# Patient Record
Sex: FEMALE | Race: WHITE | Employment: OTHER | URBAN - METROPOLITAN AREA
[De-identification: names, ages, dates, MRNs, and addresses within clinical notes are randomized per-mention and may not be internally consistent; named-entity substitution may affect disease eponyms.]

---

## 2017-02-01 ENCOUNTER — OFFICE VISIT (OUTPATIENT)
Dept: INTERNAL MEDICINE CLINIC | Age: 77
End: 2017-02-01

## 2017-02-01 VITALS
DIASTOLIC BLOOD PRESSURE: 80 MMHG | HEART RATE: 73 BPM | RESPIRATION RATE: 16 BRPM | WEIGHT: 215 LBS | SYSTOLIC BLOOD PRESSURE: 136 MMHG | BODY MASS INDEX: 38.09 KG/M2

## 2017-02-01 DIAGNOSIS — Z12.39 BREAST CANCER SCREENING: ICD-10-CM

## 2017-02-01 DIAGNOSIS — E78.2 MIXED HYPERLIPIDEMIA: ICD-10-CM

## 2017-02-01 DIAGNOSIS — E04.2 MULTINODULAR GOITER: ICD-10-CM

## 2017-02-01 DIAGNOSIS — M81.0 OSTEOPOROSIS: ICD-10-CM

## 2017-02-01 DIAGNOSIS — E11.9 TYPE 2 DIABETES MELLITUS WITHOUT COMPLICATION, WITHOUT LONG-TERM CURRENT USE OF INSULIN (HCC): ICD-10-CM

## 2017-02-01 DIAGNOSIS — E89.0 HYPOTHYROIDISM, POSTSURGICAL: ICD-10-CM

## 2017-02-01 DIAGNOSIS — K75.81 NASH (NONALCOHOLIC STEATOHEPATITIS): ICD-10-CM

## 2017-02-01 DIAGNOSIS — I10 ESSENTIAL HYPERTENSION: Primary | ICD-10-CM

## 2017-02-01 DIAGNOSIS — Z12.11 COLON CANCER SCREENING: ICD-10-CM

## 2017-02-01 PROCEDURE — 99213 OFFICE O/P EST LOW 20 MIN: CPT | Performed by: INTERNAL MEDICINE

## 2017-02-01 PROCEDURE — 93000 ELECTROCARDIOGRAM COMPLETE: CPT | Performed by: INTERNAL MEDICINE

## 2017-02-01 RX ORDER — ALENDRONATE SODIUM 70 MG/1
70 TABLET ORAL
Qty: 12 TABLET | Refills: 1 | Status: SHIPPED | OUTPATIENT
Start: 2017-02-01 | End: 2017-08-04 | Stop reason: SDUPTHER

## 2017-02-01 RX ORDER — LEVOTHYROXINE SODIUM 0.1 MG/1
100 TABLET ORAL DAILY
Qty: 90 TABLET | Refills: 1 | Status: SHIPPED | OUTPATIENT
Start: 2017-02-01 | End: 2017-08-04 | Stop reason: SDUPTHER

## 2017-02-01 RX ORDER — DILTIAZEM HYDROCHLORIDE 240 MG/1
CAPSULE, EXTENDED RELEASE ORAL
Qty: 90 CAPSULE | Refills: 1 | Status: SHIPPED | OUTPATIENT
Start: 2017-02-01 | End: 2017-08-04 | Stop reason: SDUPTHER

## 2017-02-24 ENCOUNTER — OFFICE VISIT (OUTPATIENT)
Dept: INTERNAL MEDICINE CLINIC | Age: 77
End: 2017-02-24

## 2017-02-24 VITALS
DIASTOLIC BLOOD PRESSURE: 80 MMHG | RESPIRATION RATE: 16 BRPM | WEIGHT: 218 LBS | BODY MASS INDEX: 38.62 KG/M2 | SYSTOLIC BLOOD PRESSURE: 153 MMHG | HEART RATE: 74 BPM

## 2017-02-24 DIAGNOSIS — H61.22 IMPACTED CERUMEN OF LEFT EAR: Primary | ICD-10-CM

## 2017-02-24 PROCEDURE — 99213 OFFICE O/P EST LOW 20 MIN: CPT | Performed by: INTERNAL MEDICINE

## 2017-05-15 RX ORDER — AZITHROMYCIN 250 MG/1
TABLET, FILM COATED ORAL
Qty: 1 PACKET | Refills: 0 | Status: SHIPPED | OUTPATIENT
Start: 2017-05-15 | End: 2017-05-25

## 2017-06-19 ENCOUNTER — HOSPITAL ENCOUNTER (OUTPATIENT)
Dept: WOMENS IMAGING | Age: 77
Discharge: HOME | End: 2017-06-19
Attending: INTERNAL MEDICINE | Admitting: INTERNAL MEDICINE

## 2017-06-19 DIAGNOSIS — Z12.39 BREAST CANCER SCREENING: ICD-10-CM

## 2017-06-22 ENCOUNTER — OFFICE VISIT (OUTPATIENT)
Dept: INTERNAL MEDICINE CLINIC | Age: 77
End: 2017-06-22

## 2017-06-22 VITALS
HEART RATE: 68 BPM | WEIGHT: 216.6 LBS | BODY MASS INDEX: 38.37 KG/M2 | RESPIRATION RATE: 16 BRPM | SYSTOLIC BLOOD PRESSURE: 138 MMHG | DIASTOLIC BLOOD PRESSURE: 70 MMHG

## 2017-06-22 DIAGNOSIS — Z12.39 BREAST CANCER SCREENING: ICD-10-CM

## 2017-06-22 DIAGNOSIS — M81.0 OSTEOPOROSIS: ICD-10-CM

## 2017-06-22 DIAGNOSIS — E11.9 TYPE 2 DIABETES MELLITUS WITHOUT COMPLICATION, WITHOUT LONG-TERM CURRENT USE OF INSULIN (HCC): ICD-10-CM

## 2017-06-22 DIAGNOSIS — I73.9 CLAUDICATION (HCC): Primary | ICD-10-CM

## 2017-06-22 DIAGNOSIS — E89.0 HYPOTHYROIDISM, POSTSURGICAL: ICD-10-CM

## 2017-06-22 DIAGNOSIS — I10 ESSENTIAL HYPERTENSION: ICD-10-CM

## 2017-06-22 PROCEDURE — 99214 OFFICE O/P EST MOD 30 MIN: CPT | Performed by: INTERNAL MEDICINE

## 2017-06-27 ENCOUNTER — HOSPITAL ENCOUNTER (OUTPATIENT)
Dept: ULTRASOUND IMAGING | Age: 77
Discharge: HOME | End: 2017-06-27
Attending: INTERNAL MEDICINE | Admitting: INTERNAL MEDICINE

## 2017-06-27 DIAGNOSIS — N64.89 BREAST ASYMMETRY: ICD-10-CM

## 2017-06-27 DIAGNOSIS — R92.8 ABNORMAL MAMMOGRAM: ICD-10-CM

## 2017-08-04 RX ORDER — LEVOTHYROXINE SODIUM 0.1 MG/1
100 TABLET ORAL DAILY
Qty: 90 TABLET | Refills: 1 | Status: SHIPPED | OUTPATIENT
Start: 2017-08-04 | End: 2018-02-06 | Stop reason: SDUPTHER

## 2017-08-04 RX ORDER — ALENDRONATE SODIUM 70 MG/1
70 TABLET ORAL
Qty: 12 TABLET | Refills: 1 | Status: SHIPPED | OUTPATIENT
Start: 2017-08-04 | End: 2018-02-06 | Stop reason: SDUPTHER

## 2017-08-04 RX ORDER — DILTIAZEM HYDROCHLORIDE 240 MG/1
CAPSULE, EXTENDED RELEASE ORAL
Qty: 90 CAPSULE | Refills: 1 | Status: SHIPPED | OUTPATIENT
Start: 2017-08-04 | End: 2018-02-06 | Stop reason: SDUPTHER

## 2017-09-12 ENCOUNTER — TELEPHONE (OUTPATIENT)
Dept: INTERNAL MEDICINE CLINIC | Age: 77
End: 2017-09-12

## 2017-09-12 RX ORDER — AZITHROMYCIN 250 MG/1
TABLET, FILM COATED ORAL
Qty: 1 PACKET | Refills: 0 | Status: SHIPPED | OUTPATIENT
Start: 2017-09-12 | End: 2017-09-22

## 2017-10-06 ENCOUNTER — OFFICE VISIT (OUTPATIENT)
Dept: INTERNAL MEDICINE CLINIC | Age: 77
End: 2017-10-06

## 2017-10-06 VITALS
WEIGHT: 217 LBS | RESPIRATION RATE: 16 BRPM | SYSTOLIC BLOOD PRESSURE: 108 MMHG | HEART RATE: 60 BPM | BODY MASS INDEX: 38.44 KG/M2 | DIASTOLIC BLOOD PRESSURE: 70 MMHG

## 2017-10-06 DIAGNOSIS — E78.2 MIXED HYPERLIPIDEMIA: ICD-10-CM

## 2017-10-06 DIAGNOSIS — E11.620 NECROBIOSIS LIPOIDICA DIABETICORUM (HCC): ICD-10-CM

## 2017-10-06 DIAGNOSIS — I10 ESSENTIAL HYPERTENSION: Primary | ICD-10-CM

## 2017-10-06 DIAGNOSIS — E03.9 HYPOTHYROIDISM, UNSPECIFIED TYPE: ICD-10-CM

## 2017-10-06 DIAGNOSIS — Z23 NEED FOR IMMUNIZATION AGAINST INFLUENZA: ICD-10-CM

## 2017-10-06 PROCEDURE — G0008 ADMIN INFLUENZA VIRUS VAC: HCPCS | Performed by: INTERNAL MEDICINE

## 2017-10-06 PROCEDURE — 90662 IIV NO PRSV INCREASED AG IM: CPT | Performed by: INTERNAL MEDICINE

## 2017-10-06 PROCEDURE — 99214 OFFICE O/P EST MOD 30 MIN: CPT | Performed by: INTERNAL MEDICINE

## 2017-10-06 NOTE — MR AVS SNAPSHOT
After Visit Summary             Tere Ellington   10/6/2017 11:00 AM   Office Visit    Description:  Female : 1940   Provider:  Kylie Nunn MD   Department:  [facility] Internal Medicine              Your Follow-Up and Future Appointments         Below is a list of your follow-up and future appointments. This may not be a complete list as you may have made appointments directly with providers that we are not aware of or your providers may have made some for you. Please call your providers to confirm appointments. It is important to keep your appointments. Please bring your current insurance card, photo ID, co-pay, and all medication bottles to your appointment. If self-pay, payment is expected at the time of service. Your To-Do List     Future Orders Complete By Expires    CBC Auto Differential [IJM2631 Custom]  10/6/2017 (Approximate) 10/6/2018    Comprehensive Metabolic Panel [LFC19 Custom]  10/6/2017 (Approximate) 10/6/2018    Lipid Panel [LAB18 Custom]  10/6/2017 (Approximate) 10/6/2018    Follow-Up    Return in about 4 months (around 2018). Information from Your Visit        Department     Name Address Phone Fax    [facility] Internal Medicine [address] [place], [place] [address] [phone]      You Were Seen for:         Comments    Essential hypertension   [[id number]]         Vital Signs     Blood Pressure Pulse Respirations Weight Body Mass Index Smoking Status    108/70 60 16 217 lb (98.4 kg) 38.44 kg/m2 Never Smoker         Today's Medication Changes          These changes are accurate as of: 10/6/17 11:15 AM.  If you have any questions, ask your nurse or doctor.                STOP taking these medications           DEMADEX 20 MG tablet   Generic drug:  torsemide   Stopped by:  Kylie Nunn MD       lisinopril 5 MG tablet   Commonly known as:  PRINIVIL;ZESTRIL   Stopped by:  Kylie Nunn MD               Your Current Medications Are results, renew your prescriptions, schedule appointments, view visit notes, and more. How Do I Sign Up? 1. In your Internet browser, go to [email or web address]  2. Click on the Sign Up Now link in the Sign In box. You will see the New Member Sign Up page. 3. Enter your MEDSEEKt Access Code exactly as it appears below. You will not need to use this code after youve completed the sign-up process. If you do not sign up before the expiration date, you must request a new code. [employer] Access Code: [email or web address]  Expires: 12/5/2017 11:15 AM    4. Enter your Social Security Number ([id number]) and Date of Birth (mm/dd/yyyy) as indicated and click Submit. You will be taken to the next sign-up page. 5. Create a [employer] ID. This will be your [employer] login ID and cannot be changed, so think of one that is secure and easy to remember. 6. Create a [employer] password. You can change your password at any time. 7. Enter your Password Reset Question and Answer. This can be used at a later time if you forget your password. 8. Enter your e-mail address. You will receive e-mail notification when new information is available in [address] 9. Click Sign Up. You can now view your medical record. Additional Information  If you have questions, please contact the physician practice where you receive care. Remember, [employer] is NOT to be used for urgent needs. For medical emergencies, dial 911. For questions regarding your [employer] account call [phone]. If you have a clinical question, please call your doctor's office.

## 2017-10-06 NOTE — PROGRESS NOTES
Subjective: Bernadette Coleman is a 76 y.o. female who presents today for follow up on her chronic medical conditions as noted below. Patient Active Problem List:     Type II Diabetes Mellitus     VILLANUEVA (nonalcoholic steatohepatitis)     Hypertension     Colon cancer screening     Breast cancer screening     Multinodular goiter     Necrobiosis lipoidica diabeticorum ([facility] [address]     Family history of abdominal aortic aneurysm     Lumbar Spinal Stenosis     Proximal humerus fracture     Osteoporosis     Obesity, Class II, BMI 35-39.9, with comorbidity ([facility] [address]     Hypothyroidism, postsurgical     Urolithiasis     She was last seen in June    She has stopped lisinopril and demadex; doesn't want to take them    Got mammogram and US breast and ultimately turned out normal    Last visit in June she complained of feeling in legs that could be claudication; didn't want to get the vasc study I caroldered; complained of inadequate  assistnace to get there    Have also referred for colonosocopy and she pleads inadequate transport for not getting that, but has order and was urged to get it schuedlued    Patient denies any exertional chest pain, dyspnea, palpitations, syncope, orthopnea, edema or paroxysmal nocturnal dyspnea. bp is fine    Is no longer clinicaly diabetic  Last FBS in June 99 with hgbA1c 5.1    Her necrobiosis lipoidica diabeticorum is much improved from past ; mild at this point  Current Outpatient Prescriptions   Medication Sig Dispense Refill    levothyroxine (SYNTHROID) 100 MCG tablet Take 1 tablet by mouth daily 90 tablet 1    alendronate (FOSAMAX) 70 MG tablet Take 1 tablet by mouth every 7 days 12 tablet 1    diltiazem (DILACOR XR) 240 MG extended release capsule TAKE ONE CAPSULE BY MOUTH DAILY 90 capsule 1    ammonium lactate (LAC-HYDRIN) 12 % cream Apply as directed 1 Tube 3    mometasone (ELOCON) 0.1 % cream Apply topically daily.  1 Tube 1    Calcium Citrate-Vitamin D (CITRACAL + D PO) Take 500  INFLUENZA, HIGH DOSE, 65 YRS +, IM, PF, PREFILL SYR, 0.5ML (FLUZONE HD)    CBC Auto Differential    Comprehensive Metabolic Panel    Lipid Panel    TSH without Reflex     Get colonoscoy   Monitor bp

## 2018-02-06 ENCOUNTER — OFFICE VISIT (OUTPATIENT)
Dept: INTERNAL MEDICINE CLINIC | Age: 78
End: 2018-02-06

## 2018-02-06 VITALS
BODY MASS INDEX: 38.58 KG/M2 | RESPIRATION RATE: 16 BRPM | HEART RATE: 64 BPM | WEIGHT: 217.8 LBS | SYSTOLIC BLOOD PRESSURE: 120 MMHG | DIASTOLIC BLOOD PRESSURE: 60 MMHG

## 2018-02-06 DIAGNOSIS — E11.620 NECROBIOSIS LIPOIDICA DIABETICORUM (HCC): ICD-10-CM

## 2018-02-06 DIAGNOSIS — G56.03 CARPAL TUNNEL SYNDROME ON BOTH SIDES: Primary | ICD-10-CM

## 2018-02-06 DIAGNOSIS — E11.628 TYPE 2 DIABETES MELLITUS WITH OTHER SKIN COMPLICATION, WITHOUT LONG-TERM CURRENT USE OF INSULIN (HCC): ICD-10-CM

## 2018-02-06 PROCEDURE — 99213 OFFICE O/P EST LOW 20 MIN: CPT | Performed by: INTERNAL MEDICINE

## 2018-02-06 RX ORDER — ALENDRONATE SODIUM 70 MG/1
70 TABLET ORAL
Qty: 12 TABLET | Refills: 1 | Status: SHIPPED | OUTPATIENT
Start: 2018-02-06 | End: 2018-09-06 | Stop reason: SDUPTHER

## 2018-02-06 RX ORDER — LEVOTHYROXINE SODIUM 0.1 MG/1
100 TABLET ORAL DAILY
Qty: 90 TABLET | Refills: 1 | Status: SHIPPED | OUTPATIENT
Start: 2018-02-06 | End: 2018-09-06 | Stop reason: SDUPTHER

## 2018-02-06 RX ORDER — DILTIAZEM HYDROCHLORIDE 240 MG/1
CAPSULE, EXTENDED RELEASE ORAL
Qty: 90 CAPSULE | Refills: 1 | Status: SHIPPED | OUTPATIENT
Start: 2018-02-06 | End: 2018-09-06 | Stop reason: SDUPTHER

## 2018-02-06 NOTE — PATIENT INSTRUCTIONS
Patient Education        Carpal Tunnel Syndrome: Care Instructions  Your Care Instructions    Carpal tunnel syndrome is a nerve problem. It can cause tingling, numbness, weakness, or pain in the fingers, thumb, and hand. The median nerve and several tough tissues called tendons run through a space in the wrist called the carpal tunnel. The repeated hand motions used in work and some hobbies and sports can put pressure on the nerve. Pregnancy and several conditions, including diabetes, arthritis, and an underactive thyroid, also can cause carpal tunnel syndrome. You may be able to limit an activity or do it differently to reduce your symptoms. You also can take other steps to feel better. If your symptoms are mild, 1 to 2 weeks of home treatment are likely to ease your pain. Surgery is needed only if other treatments do not work. Follow-up care is a key part of your treatment and safety. Be sure to make and go to all appointments, and call your doctor if you are having problems. It's also a good idea to know your test results and keep a list of the medicines you take. How can you care for yourself at home? · If possible, stop or reduce the activity that causes your symptoms. If you cannot stop the activity, take frequent breaks to rest and stretch or change hand positions to do a task. Try switching hands, such as when using a computer mouse. · Try to avoid bending or twisting your wrists. · Ask your doctor if you can take an over-the-counter pain medicine, such as acetaminophen (Tylenol), ibuprofen (Advil, Motrin), or naproxen (Aleve). Be safe with medicines. Read and follow all instructions on the label. · If your doctor prescribes corticosteroid medicine to help reduce pain and swelling, take it exactly as prescribed. Call your doctor if you think you are having a problem with your medicine. · Put ice or a cold pack on your wrist for 10 to 20 minutes at a time to ease pain.  Put a thin cloth between the

## 2018-02-06 NOTE — PROGRESS NOTES
 Hypothyroidism, postsurgical 1997    Left Thyroid Lobectomy    Lumbar Spinal Stenosis 2006    severe    Multinodular goiter 1996    suppresion and bx negative; s/p thyroid lobectomy    VILLANUEVA (nonalcoholic steatohepatitis) 2005    villanueva vs cirrhosis;  Dr. Jeronimo Marsk Necrobiosis lipoidica diabeticorum ([facility] [address]     Obesity, Class II, BMI 35-39.9, with comorbidity     Osteoporosis 1/2015    FRAX 5.5 & 17%    Polymyalgia rheumatica ([facility] [address] 1/2012    Dr Sayda Dubon R; released her 3/2014    RBBB 02/2017    new finding    Type II Diabetes Mellitus 1994    Urolithiasis     once        Social History   Substance Use Topics    Smoking status: Never Smoker    Smokeless tobacco: Never Used    Alcohol use No        ROS: The patient has had no headache, sore throat, fever or chills, cough, dyspnea, chest pain, nausea, vomiting or diarrhea, or edema. Objective:      /60   Pulse 64   Resp 16   Wt 217 lb 12.8 oz (98.8 kg)   BMI 38.58 kg/m²    General: in no apparent distress   The patient's neck is free of nodes. Lungs are clear. Heart is normal in rate and regular in rhythm. Legs are free of edema. No rash or erythema. Feb lab rev'd     Assessment / Plan:      1. Carpal tunnel syndrome on both sides    2. Type 2 diabetes mellitus with other skin complication, without long-term current use of insulin (HCC)    3.  Necrobiosis lipoidica diabeticorum ([facility] [address]            Plan   Carpal tunnel splints prescribed  Exercises prescribed and printed  Same meds  RTC 4 mo    Referred her in Oct for colonoscopy and she hasn't done it yet ; encouraged to do so

## 2018-03-05 ENCOUNTER — TELEPHONE (OUTPATIENT)
Dept: INTERNAL MEDICINE CLINIC | Age: 78
End: 2018-03-05

## 2018-03-05 RX ORDER — AZITHROMYCIN 250 MG/1
TABLET, FILM COATED ORAL
Qty: 1 PACKET | Refills: 0 | Status: SHIPPED | OUTPATIENT
Start: 2018-03-05 | End: 2018-03-15

## 2018-03-15 ENCOUNTER — TELEPHONE (OUTPATIENT)
Dept: INTERNAL MEDICINE CLINIC | Age: 78
End: 2018-03-15

## 2018-03-16 ENCOUNTER — OFFICE VISIT (OUTPATIENT)
Dept: INTERNAL MEDICINE CLINIC | Age: 78
End: 2018-03-16

## 2018-03-16 VITALS
BODY MASS INDEX: 38.09 KG/M2 | WEIGHT: 215 LBS | RESPIRATION RATE: 16 BRPM | SYSTOLIC BLOOD PRESSURE: 124 MMHG | HEART RATE: 80 BPM | DIASTOLIC BLOOD PRESSURE: 62 MMHG | TEMPERATURE: 97.8 F

## 2018-03-16 DIAGNOSIS — J40 BRONCHITIS: Primary | ICD-10-CM

## 2018-03-16 PROCEDURE — 99213 OFFICE O/P EST LOW 20 MIN: CPT | Performed by: INTERNAL MEDICINE

## 2018-03-16 RX ORDER — LEVOFLOXACIN 500 MG/1
500 TABLET, FILM COATED ORAL DAILY
Qty: 7 TABLET | Refills: 0 | Status: SHIPPED | OUTPATIENT
Start: 2018-03-16 | End: 2018-03-23

## 2018-03-16 RX ORDER — METHYLPREDNISOLONE 4 MG/1
TABLET ORAL
Qty: 1 KIT | Refills: 0 | Status: SHIPPED | OUTPATIENT
Start: 2018-03-16 | End: 2018-06-06 | Stop reason: ALTCHOICE

## 2018-03-16 RX ORDER — IPRATROPIUM BROMIDE 42 UG/1
2 SPRAY, METERED NASAL 3 TIMES DAILY
Qty: 1 BOTTLE | Refills: 3 | Status: SHIPPED | OUTPATIENT
Start: 2018-03-16 | End: 2020-02-27 | Stop reason: ALTCHOICE

## 2018-03-16 NOTE — PROGRESS NOTES
Subjective: Lucina Montez is a 76 y.o. female who presents today for follow up on her chronic medical conditions as noted below. Patient Active Problem List:     Type II Diabetes Mellitus     VILLANUEVA (nonalcoholic steatohepatitis)     Hypertension     Colon cancer screening     Breast cancer screening     Multinodular goiter     Necrobiosis lipoidica diabeticorum ([address]     Family history of abdominal aortic aneurysm     Lumbar Spinal Stenosis     Proximal humerus fracture     Osteoporosis     Obesity, Class II, BMI 35-39.9, with comorbidity     Hypothyroidism, postsurgical     Urolithiasis     She was last seen in jan for CTS    Complains of cough today    I called in zpak on March 5 for resp infection  She completed w/o resolution    She is currently complaining of rhinorrhea, clogged ears and lots of coughing  Has paroxysms of coughing  Very productive  Some wheezing    No otalgia   No fever or tavel    Current Outpatient Prescriptions   Medication Sig Dispense Refill    methylPREDNISolone (MEDROL, TIFFANIE,) 4 MG tablet Take as instructed on the package 1 kit 0    ipratropium (ATROVENT) 0.06 % nasal spray 2 sprays by Nasal route 3 times daily 1 Bottle 3    levofloxacin (LEVAQUIN) 500 MG tablet Take 1 tablet by mouth daily for 7 days 7 tablet 0    diltiazem (DILACOR XR) 240 MG extended release capsule TAKE ONE CAPSULE BY MOUTH DAILY 90 capsule 1    levothyroxine (SYNTHROID) 100 MCG tablet Take 1 tablet by mouth daily 90 tablet 1    alendronate (FOSAMAX) 70 MG tablet Take 1 tablet by mouth every 7 days 12 tablet 1    ammonium lactate (LAC-HYDRIN) 12 % cream Apply as directed 1 Tube 3    mometasone (ELOCON) 0.1 % cream Apply topically daily. 1 Tube 1    Calcium Citrate-Vitamin D (CITRACAL + D PO) Take 500 mg by mouth daily.  Cholecalciferol (VITAMIN D) 2000 UNITS CAPS capsule Take 1 capsule by mouth daily. No current facility-administered medications for this visit.         Past Medical History:

## 2018-03-17 ENCOUNTER — HOSPITAL ENCOUNTER (OUTPATIENT)
Dept: GENERAL RADIOLOGY | Age: 78
Discharge: HOME | End: 2018-03-17
Attending: INTERNAL MEDICINE | Admitting: INTERNAL MEDICINE

## 2018-03-17 DIAGNOSIS — J40 BRONCHITIS: ICD-10-CM

## 2018-06-06 ENCOUNTER — OFFICE VISIT (OUTPATIENT)
Dept: INTERNAL MEDICINE CLINIC | Age: 78
End: 2018-06-06

## 2018-06-06 VITALS
RESPIRATION RATE: 16 BRPM | HEART RATE: 68 BPM | BODY MASS INDEX: 38.09 KG/M2 | SYSTOLIC BLOOD PRESSURE: 134 MMHG | HEIGHT: 63 IN | WEIGHT: 215 LBS | DIASTOLIC BLOOD PRESSURE: 76 MMHG

## 2018-06-06 DIAGNOSIS — E78.2 MIXED HYPERLIPIDEMIA: ICD-10-CM

## 2018-06-06 DIAGNOSIS — I10 ESSENTIAL HYPERTENSION: Primary | ICD-10-CM

## 2018-06-06 DIAGNOSIS — E11.9 CONTROLLED TYPE 2 DIABETES MELLITUS WITHOUT COMPLICATION, WITHOUT LONG-TERM CURRENT USE OF INSULIN (HCC): ICD-10-CM

## 2018-06-06 PROCEDURE — 3288F FALL RISK ASSESSMENT DOCD: CPT | Performed by: INTERNAL MEDICINE

## 2018-06-06 PROCEDURE — G8510 SCR DEP NEG, NO PLAN REQD: HCPCS | Performed by: INTERNAL MEDICINE

## 2018-06-06 PROCEDURE — 99213 OFFICE O/P EST LOW 20 MIN: CPT | Performed by: INTERNAL MEDICINE

## 2018-06-06 ASSESSMENT — PATIENT HEALTH QUESTIONNAIRE - PHQ9
2. FEELING DOWN, DEPRESSED OR HOPELESS: 0
SUM OF ALL RESPONSES TO PHQ QUESTIONS 1-9: 0
1. LITTLE INTEREST OR PLEASURE IN DOING THINGS: 0
SUM OF ALL RESPONSES TO PHQ9 QUESTIONS 1 & 2: 0

## 2018-09-06 ENCOUNTER — OFFICE VISIT (OUTPATIENT)
Dept: INTERNAL MEDICINE CLINIC | Age: 78
End: 2018-09-06

## 2018-09-06 VITALS
BODY MASS INDEX: 37.38 KG/M2 | DIASTOLIC BLOOD PRESSURE: 80 MMHG | RESPIRATION RATE: 16 BRPM | OXYGEN SATURATION: 98 % | WEIGHT: 211 LBS | HEART RATE: 65 BPM | SYSTOLIC BLOOD PRESSURE: 138 MMHG

## 2018-09-06 DIAGNOSIS — E03.9 HYPOTHYROIDISM, UNSPECIFIED TYPE: ICD-10-CM

## 2018-09-06 DIAGNOSIS — I10 ESSENTIAL HYPERTENSION: Primary | ICD-10-CM

## 2018-09-06 DIAGNOSIS — E11.628 TYPE 2 DIABETES MELLITUS WITH OTHER SKIN COMPLICATION, WITHOUT LONG-TERM CURRENT USE OF INSULIN (HCC): ICD-10-CM

## 2018-09-06 PROCEDURE — 99213 OFFICE O/P EST LOW 20 MIN: CPT | Performed by: INTERNAL MEDICINE

## 2018-09-06 RX ORDER — ALENDRONATE SODIUM 70 MG/1
70 TABLET ORAL
Qty: 12 TABLET | Refills: 1 | Status: SHIPPED | OUTPATIENT
Start: 2018-09-06 | End: 2019-03-28 | Stop reason: SDUPTHER

## 2018-09-06 RX ORDER — LEVOTHYROXINE SODIUM 0.1 MG/1
100 TABLET ORAL DAILY
Qty: 90 TABLET | Refills: 1 | Status: SHIPPED | OUTPATIENT
Start: 2018-09-06 | End: 2019-03-28 | Stop reason: SDUPTHER

## 2018-09-06 RX ORDER — DILTIAZEM HYDROCHLORIDE 240 MG/1
CAPSULE, EXTENDED RELEASE ORAL
Qty: 90 CAPSULE | Refills: 1 | Status: SHIPPED | OUTPATIENT
Start: 2018-09-06 | End: 2019-03-28 | Stop reason: SDUPTHER

## 2018-10-25 ENCOUNTER — TELEPHONE (OUTPATIENT)
Dept: INTERNAL MEDICINE CLINIC | Age: 78
End: 2018-10-25

## 2018-10-25 RX ORDER — AZITHROMYCIN 250 MG/1
TABLET, FILM COATED ORAL
Qty: 1 PACKET | Refills: 0 | Status: SHIPPED | OUTPATIENT
Start: 2018-10-25 | End: 2018-10-29

## 2019-02-06 ENCOUNTER — OFFICE VISIT (OUTPATIENT)
Dept: INTERNAL MEDICINE CLINIC | Age: 79
End: 2019-02-06
Payer: COMMERCIAL

## 2019-02-06 VITALS
BODY MASS INDEX: 37.02 KG/M2 | WEIGHT: 209 LBS | OXYGEN SATURATION: 97 % | HEART RATE: 89 BPM | DIASTOLIC BLOOD PRESSURE: 70 MMHG | SYSTOLIC BLOOD PRESSURE: 138 MMHG

## 2019-02-06 DIAGNOSIS — I10 ESSENTIAL HYPERTENSION: ICD-10-CM

## 2019-02-06 DIAGNOSIS — Z23 NEED FOR IMMUNIZATION AGAINST INFLUENZA: ICD-10-CM

## 2019-02-06 DIAGNOSIS — E11.628 TYPE 2 DIABETES MELLITUS WITH OTHER SKIN COMPLICATION, WITHOUT LONG-TERM CURRENT USE OF INSULIN (HCC): ICD-10-CM

## 2019-02-06 DIAGNOSIS — N18.30 CHRONIC KIDNEY DISEASE, STAGE III (MODERATE) (HCC): ICD-10-CM

## 2019-02-06 DIAGNOSIS — E03.9 HYPOTHYROIDISM, UNSPECIFIED TYPE: ICD-10-CM

## 2019-02-06 DIAGNOSIS — G56.03 BILATERAL CARPAL TUNNEL SYNDROME: Primary | ICD-10-CM

## 2019-02-06 DIAGNOSIS — E78.2 MIXED HYPERLIPIDEMIA: ICD-10-CM

## 2019-02-06 DIAGNOSIS — E11.620 NECROBIOSIS LIPOIDICA DIABETICORUM (HCC): ICD-10-CM

## 2019-02-06 PROCEDURE — 99214 OFFICE O/P EST MOD 30 MIN: CPT | Performed by: INTERNAL MEDICINE

## 2019-02-06 PROCEDURE — G0008 ADMIN INFLUENZA VIRUS VAC: HCPCS | Performed by: INTERNAL MEDICINE

## 2019-02-06 PROCEDURE — 90662 IIV NO PRSV INCREASED AG IM: CPT | Performed by: INTERNAL MEDICINE

## 2019-02-26 ENCOUNTER — OFFICE VISIT (OUTPATIENT)
Dept: PHYSICAL MEDICINE AND REHAB | Age: 79
End: 2019-02-26
Payer: COMMERCIAL

## 2019-02-26 DIAGNOSIS — M79.601 PARESTHESIA AND PAIN OF BOTH UPPER EXTREMITIES: Primary | ICD-10-CM

## 2019-02-26 DIAGNOSIS — M79.602 PARESTHESIA AND PAIN OF BOTH UPPER EXTREMITIES: Primary | ICD-10-CM

## 2019-02-26 DIAGNOSIS — R29.898 HAND WEAKNESS: ICD-10-CM

## 2019-02-26 DIAGNOSIS — G56.03 BILATERAL CARPAL TUNNEL SYNDROME: ICD-10-CM

## 2019-02-26 DIAGNOSIS — R20.2 PARESTHESIA AND PAIN OF BOTH UPPER EXTREMITIES: Primary | ICD-10-CM

## 2019-02-26 PROCEDURE — 95911 NRV CNDJ TEST 9-10 STUDIES: CPT | Performed by: PHYSICAL MEDICINE & REHABILITATION

## 2019-02-26 PROCEDURE — 95886 MUSC TEST DONE W/N TEST COMP: CPT | Performed by: PHYSICAL MEDICINE & REHABILITATION

## 2019-03-12 ENCOUNTER — OFFICE VISIT (OUTPATIENT)
Dept: SURGERY | Age: 79
End: 2019-03-12
Payer: COMMERCIAL

## 2019-03-12 VITALS
RESPIRATION RATE: 14 BRPM | WEIGHT: 210.8 LBS | SYSTOLIC BLOOD PRESSURE: 143 MMHG | BODY MASS INDEX: 37.35 KG/M2 | DIASTOLIC BLOOD PRESSURE: 59 MMHG | HEART RATE: 66 BPM | HEIGHT: 63 IN

## 2019-03-12 DIAGNOSIS — G56.03 BILATERAL CARPAL TUNNEL SYNDROME: Primary | ICD-10-CM

## 2019-03-12 PROCEDURE — 99202 OFFICE O/P NEW SF 15 MIN: CPT | Performed by: SURGERY

## 2019-03-12 ASSESSMENT — ENCOUNTER SYMPTOMS
APNEA: 0
ABDOMINAL DISTENTION: 0
CHEST TIGHTNESS: 0
CONSTIPATION: 0
NAUSEA: 0

## 2019-03-13 ENCOUNTER — ANESTHESIA EVENT (OUTPATIENT)
Dept: OPERATING ROOM | Age: 79
End: 2019-03-13
Payer: COMMERCIAL

## 2019-03-18 ENCOUNTER — HOSPITAL ENCOUNTER (OUTPATIENT)
Age: 79
Setting detail: OUTPATIENT SURGERY
Discharge: HOME | End: 2019-03-18
Attending: SURGERY | Admitting: SURGERY
Payer: COMMERCIAL

## 2019-03-18 ENCOUNTER — ANESTHESIA (OUTPATIENT)
Dept: OPERATING ROOM | Age: 79
End: 2019-03-18
Payer: COMMERCIAL

## 2019-03-18 VITALS
RESPIRATION RATE: 14 BRPM | DIASTOLIC BLOOD PRESSURE: 63 MMHG | SYSTOLIC BLOOD PRESSURE: 125 MMHG | OXYGEN SATURATION: 98 % | TEMPERATURE: 97.7 F

## 2019-03-18 VITALS
OXYGEN SATURATION: 96 % | RESPIRATION RATE: 16 BRPM | SYSTOLIC BLOOD PRESSURE: 152 MMHG | HEART RATE: 61 BPM | TEMPERATURE: 97 F | DIASTOLIC BLOOD PRESSURE: 67 MMHG | BODY MASS INDEX: 37.21 KG/M2 | WEIGHT: 210 LBS | HEIGHT: 63 IN

## 2019-03-18 DIAGNOSIS — G56.03 BILATERAL CARPAL TUNNEL SYNDROME: Primary | ICD-10-CM

## 2019-03-18 LAB — GLUCOSE BLD-MCNC: 96 MG/DL (ref 70–99)

## 2019-03-18 PROCEDURE — 2580000003 HC RX 258

## 2019-03-18 PROCEDURE — 64721 CARPAL TUNNEL SURGERY: CPT | Performed by: SURGERY

## 2019-03-18 PROCEDURE — 3600000012 HC SURGERY LEVEL 2 ADDTL 15MIN: Performed by: SURGERY

## 2019-03-18 PROCEDURE — 6360000002 HC RX W HCPCS: Performed by: NURSE ANESTHETIST, CERTIFIED REGISTERED

## 2019-03-18 PROCEDURE — 7100000011 HC PHASE II RECOVERY - ADDTL 15 MIN: Performed by: SURGERY

## 2019-03-18 PROCEDURE — 3700000000 HC ANESTHESIA ATTENDED CARE: Performed by: SURGERY

## 2019-03-18 PROCEDURE — 7100000010 HC PHASE II RECOVERY - FIRST 15 MIN: Performed by: SURGERY

## 2019-03-18 PROCEDURE — 3700000001 HC ADD 15 MINUTES (ANESTHESIA): Performed by: SURGERY

## 2019-03-18 PROCEDURE — 82962 GLUCOSE BLOOD TEST: CPT

## 2019-03-18 PROCEDURE — 3600000002 HC SURGERY LEVEL 2 BASE: Performed by: SURGERY

## 2019-03-18 PROCEDURE — 2709999900 HC NON-CHARGEABLE SUPPLY: Performed by: SURGERY

## 2019-03-18 RX ORDER — SODIUM CHLORIDE, SODIUM LACTATE, POTASSIUM CHLORIDE, CALCIUM CHLORIDE 600; 310; 30; 20 MG/100ML; MG/100ML; MG/100ML; MG/100ML
INJECTION, SOLUTION INTRAVENOUS
Status: COMPLETED
Start: 2019-03-18 | End: 2019-03-18

## 2019-03-18 RX ORDER — PROPOFOL 10 MG/ML
INJECTION, EMULSION INTRAVENOUS PRN
Status: DISCONTINUED | OUTPATIENT
Start: 2019-03-18 | End: 2019-03-18 | Stop reason: SDUPTHER

## 2019-03-18 RX ORDER — SODIUM CHLORIDE, SODIUM LACTATE, POTASSIUM CHLORIDE, CALCIUM CHLORIDE 600; 310; 30; 20 MG/100ML; MG/100ML; MG/100ML; MG/100ML
INJECTION, SOLUTION INTRAVENOUS CONTINUOUS
Status: DISCONTINUED | OUTPATIENT
Start: 2019-03-18 | End: 2019-03-18 | Stop reason: HOSPADM

## 2019-03-18 RX ORDER — SODIUM CHLORIDE 0.9 % (FLUSH) 0.9 %
SYRINGE (ML) INJECTION
Status: DISCONTINUED
Start: 2019-03-18 | End: 2019-03-18 | Stop reason: HOSPADM

## 2019-03-18 RX ORDER — FENTANYL CITRATE 50 UG/ML
INJECTION, SOLUTION INTRAMUSCULAR; INTRAVENOUS PRN
Status: DISCONTINUED | OUTPATIENT
Start: 2019-03-18 | End: 2019-03-18 | Stop reason: SDUPTHER

## 2019-03-18 RX ORDER — DEXAMETHASONE SODIUM PHOSPHATE 4 MG/ML
INJECTION, SOLUTION INTRA-ARTICULAR; INTRALESIONAL; INTRAMUSCULAR; INTRAVENOUS; SOFT TISSUE PRN
Status: DISCONTINUED | OUTPATIENT
Start: 2019-03-18 | End: 2019-03-18 | Stop reason: SDUPTHER

## 2019-03-18 RX ORDER — ONDANSETRON 2 MG/ML
INJECTION INTRAMUSCULAR; INTRAVENOUS PRN
Status: DISCONTINUED | OUTPATIENT
Start: 2019-03-18 | End: 2019-03-18 | Stop reason: SDUPTHER

## 2019-03-18 RX ORDER — HYDROCODONE BITARTRATE AND ACETAMINOPHEN 5; 325 MG/1; MG/1
1 TABLET ORAL EVERY 6 HOURS PRN
Qty: 20 TABLET | Refills: 0 | Status: SHIPPED | OUTPATIENT
Start: 2019-03-18 | End: 2019-03-25

## 2019-03-18 RX ADMIN — SODIUM CHLORIDE, POTASSIUM CHLORIDE, SODIUM LACTATE AND CALCIUM CHLORIDE: 600; 310; 30; 20 INJECTION, SOLUTION INTRAVENOUS at 08:10

## 2019-03-18 RX ADMIN — FENTANYL CITRATE 25 MCG: 50 INJECTION INTRAMUSCULAR; INTRAVENOUS at 09:12

## 2019-03-18 RX ADMIN — DEXAMETHASONE SODIUM PHOSPHATE 8 MG: 4 INJECTION, SOLUTION INTRAMUSCULAR; INTRAVENOUS at 09:17

## 2019-03-18 RX ADMIN — PROPOFOL 30 MG: 10 INJECTION, EMULSION INTRAVENOUS at 09:04

## 2019-03-18 RX ADMIN — ONDANSETRON 4 MG: 2 INJECTION INTRAMUSCULAR; INTRAVENOUS at 09:17

## 2019-03-18 RX ADMIN — FENTANYL CITRATE 25 MCG: 50 INJECTION INTRAMUSCULAR; INTRAVENOUS at 09:14

## 2019-03-18 RX ADMIN — FENTANYL CITRATE 25 MCG: 50 INJECTION INTRAMUSCULAR; INTRAVENOUS at 09:04

## 2019-03-18 RX ADMIN — FENTANYL CITRATE 25 MCG: 50 INJECTION INTRAMUSCULAR; INTRAVENOUS at 09:22

## 2019-03-18 RX ADMIN — SODIUM CHLORIDE, SODIUM LACTATE, POTASSIUM CHLORIDE, CALCIUM CHLORIDE: 600; 310; 30; 20 INJECTION, SOLUTION INTRAVENOUS at 08:10

## 2019-03-18 RX ADMIN — PROPOFOL 80 MG: 10 INJECTION, EMULSION INTRAVENOUS at 09:06

## 2019-03-18 RX ADMIN — LIDOCAINE HYDROCHLORIDE 60 MG: 20 INJECTION, SOLUTION INTRAVENOUS at 09:04

## 2019-03-18 ASSESSMENT — PULMONARY FUNCTION TESTS
PIF_VALUE: 0
PIF_VALUE: 1
PIF_VALUE: 0

## 2019-03-18 ASSESSMENT — PAIN SCALES - GENERAL: PAINLEVEL_OUTOF10: 0

## 2019-03-18 ASSESSMENT — PAIN - FUNCTIONAL ASSESSMENT: PAIN_FUNCTIONAL_ASSESSMENT: 0-10

## 2019-03-26 ENCOUNTER — OFFICE VISIT (OUTPATIENT)
Dept: SURGERY | Age: 79
End: 2019-03-26

## 2019-03-26 VITALS
SYSTOLIC BLOOD PRESSURE: 173 MMHG | RESPIRATION RATE: 13 BRPM | WEIGHT: 210 LBS | BODY MASS INDEX: 37.2 KG/M2 | DIASTOLIC BLOOD PRESSURE: 73 MMHG | HEART RATE: 88 BPM

## 2019-03-26 DIAGNOSIS — Z09 POSTOP CHECK: ICD-10-CM

## 2019-03-26 DIAGNOSIS — G56.03 BILATERAL CARPAL TUNNEL SYNDROME: Primary | ICD-10-CM

## 2019-03-26 PROCEDURE — 99024 POSTOP FOLLOW-UP VISIT: CPT | Performed by: SURGERY

## 2019-03-28 RX ORDER — ALENDRONATE SODIUM 70 MG/1
70 TABLET ORAL
Qty: 12 TABLET | Refills: 1 | Status: SHIPPED | OUTPATIENT
Start: 2019-03-28 | End: 2019-06-27 | Stop reason: SDUPTHER

## 2019-03-28 RX ORDER — LEVOTHYROXINE SODIUM 0.1 MG/1
100 TABLET ORAL DAILY
Qty: 90 TABLET | Refills: 1 | Status: SHIPPED | OUTPATIENT
Start: 2019-03-28 | End: 2019-06-27 | Stop reason: SDUPTHER

## 2019-03-28 RX ORDER — DILTIAZEM HYDROCHLORIDE 240 MG/1
CAPSULE, EXTENDED RELEASE ORAL
Qty: 90 CAPSULE | Refills: 1 | Status: SHIPPED | OUTPATIENT
Start: 2019-03-28 | End: 2019-06-27 | Stop reason: SDUPTHER

## 2019-06-06 ENCOUNTER — OFFICE VISIT (OUTPATIENT)
Dept: INTERNAL MEDICINE CLINIC | Age: 79
End: 2019-06-06
Payer: COMMERCIAL

## 2019-06-06 VITALS
WEIGHT: 212 LBS | OXYGEN SATURATION: 98 % | BODY MASS INDEX: 37.55 KG/M2 | RESPIRATION RATE: 14 BRPM | DIASTOLIC BLOOD PRESSURE: 62 MMHG | SYSTOLIC BLOOD PRESSURE: 120 MMHG | HEART RATE: 72 BPM

## 2019-06-06 DIAGNOSIS — R73.09 ELEVATED GLUCOSE: ICD-10-CM

## 2019-06-06 DIAGNOSIS — E11.620 NECROBIOSIS LIPOIDICA DIABETICORUM (HCC): ICD-10-CM

## 2019-06-06 DIAGNOSIS — E11.628 TYPE 2 DIABETES MELLITUS WITH OTHER SKIN COMPLICATION, WITHOUT LONG-TERM CURRENT USE OF INSULIN (HCC): ICD-10-CM

## 2019-06-06 DIAGNOSIS — E03.9 HYPOTHYROIDISM, UNSPECIFIED TYPE: ICD-10-CM

## 2019-06-06 DIAGNOSIS — G56.03 CARPAL TUNNEL SYNDROME ON BOTH SIDES: Primary | ICD-10-CM

## 2019-06-06 DIAGNOSIS — I10 ESSENTIAL HYPERTENSION: ICD-10-CM

## 2019-06-06 PROCEDURE — 99214 OFFICE O/P EST MOD 30 MIN: CPT | Performed by: INTERNAL MEDICINE

## 2019-06-06 ASSESSMENT — PATIENT HEALTH QUESTIONNAIRE - PHQ9
2. FEELING DOWN, DEPRESSED OR HOPELESS: 0
SUM OF ALL RESPONSES TO PHQ QUESTIONS 1-9: 0
1. LITTLE INTEREST OR PLEASURE IN DOING THINGS: 0
SUM OF ALL RESPONSES TO PHQ9 QUESTIONS 1 & 2: 0
SUM OF ALL RESPONSES TO PHQ QUESTIONS 1-9: 0

## 2019-06-06 NOTE — PROGRESS NOTES
Subjective: Sylvie Olivera is a 78 y.o. female who presents today for follow up on her chronic medical conditions as noted below. Patient Active Problem List:     Type II Diabetes Mellitus     VILLANUEVA (nonalcoholic steatohepatitis)     Hypertension     Multinodular goiter     Necrobiosis lipoidica diabeticorum (HCC)     Family history of abdominal aortic aneurysm     Lumbar Spinal Stenosis     Proximal humerus fracture     Osteoporosis     Obesity, Class II, BMI 35-39.9, with comorbidity     Hypothyroidism, postsurgical     Urolithiasis     She was last seen in Feb    She then had CTS sx and I ordered EMG, home exercises, splints, and appt with Theodore Philip did Right carpal tunnel release in March    EMG report:    IMPRESSION:                  1. Abnormal EMG. 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                2. Moderately severe and chronic left carpal tunnel syndrome.                 3. Neuropathic motor units in the right deltoid may simply be a posttraumatic finding limited to the muscle.                 4. No evidence of a focal spinal nerve root injury (radiculopathy), plexopathy, peripheral neuropathy or primary muscle disease.     She is doing exercises and massaging area  She doesn't feel improved; still has severe numbness ; can't open jars;  is weak  She declined OT for hand   Doesn't want to proceed now with left    She forgot to get labs prior to appt  Last FBS 91 with hgbA1c 5.2 in Sept  Will get them this month    No symptoms of hypo or hyperthyroidism: no decreased or increased weight, no feeling cold/chilly or excessively warm, no diarrhea or constipation, no undue sweatiness, anxiety or palpitations.   Last TSH was 2.03 I 2/2018  Will recheck    She is tolerating fosamax well  Last DEXA was 11/2014        Current Outpatient Medications   Medication Sig Dispense Refill    diltiazem (DILACOR XR) 240 MG extended release capsule TAKE ONE CAPSULE BY MOUTH Wt 212 lb (96.2 kg)   SpO2 98%   BMI 37.55 kg/m²      Physical Exam   Constitutional: She is oriented to person, place, and time. She appears well-developed and well-nourished. No distress. HENT:   Head: Normocephalic and atraumatic. Mouth/Throat: Oropharynx is clear and moist.   Eyes: Conjunctivae are normal. No scleral icterus. Neck: Neck supple. Cardiovascular: Normal rate and regular rhythm. No murmur heard. Pulmonary/Chest: Effort normal. No respiratory distress. She has no wheezes. She has no rales. Abdominal: Soft. She exhibits no distension. There is no tenderness. Musculoskeletal: Normal range of motion. Neurological: She is alert and oriented to person, place, and time. Coordination normal.   Skin: Skin is warm and dry. Psychiatric: She has a normal mood and affect. Her behavior is normal.   Vitals reviewed. Labs pending     Assessment / Plan:      1. Carpal tunnel syndrome on both sides    2. Elevated glucose    3. Type 2 diabetes mellitus with other skin complication, without long-term current use of insulin ([address]    4. Necrobiosis lipoidica diabeticorum ([facility] [address]    5. Essential hypertension    6.  Hypothyroidism, unspecified type            Plan   Get fasting labs ordered last time plus TSH and hgbA1c  RTC 3 mo  Orders Placed This Encounter   Procedures    Hemoglobin A1C    TSH without Reflex     Diet and exercise

## 2019-06-17 LAB
A/G RATIO: 2.3 (CALC) (ref 0.8–2.6)
ALBUMIN SERPL-MCNC: 4.3 GM/DL (ref 3.5–5.2)
ALP BLD-CCNC: 62 U/L (ref 23–144)
ALT SERPL-CCNC: 11 U/L (ref 0–60)
AST SERPL-CCNC: 12 U/L (ref 0–55)
BASOPHILS ABSOLUTE: 0.1 K/MM3 (ref 0–0.3)
BASOPHILS RELATIVE PERCENT: 0.9 % (ref 0–2)
BILIRUB SERPL-MCNC: 0.6 MG/DL (ref 0–1.2)
BUN / CREAT RATIO: 19 (CALC) (ref 7–25)
BUN BLDV-MCNC: 19 MG/DL (ref 3–29)
CALCIUM SERPL-MCNC: 8.6 MG/DL (ref 8.5–10.5)
CHLORIDE BLD-SCNC: 105 MEQ/L (ref 96–110)
CHOLESTEROL, TOTAL: 204 MG/DL
CO2: 26 MEQ/L (ref 19–32)
CREAT SERPL-MCNC: 1 MG/DL
EOSINOPHILS ABSOLUTE: 0.2 K/MM3 (ref 0–0.6)
EOSINOPHILS RELATIVE PERCENT: 2.9 % (ref 0–7)
GFR SERPL CREATININE-BSD FRML MDRD: 53 ML/MIN/1.73M2
GLOBULIN: 1.9 GM/DL (CALC) (ref 1.9–3.6)
GLUCOSE BLD-MCNC: 95 MG/DL
HBA1C MFR BLD: 5.4 % TOTAL HGB
HCT VFR BLD CALC: 39.8 % (ref 35–46)
HDLC SERPL-MCNC: 53 MG/DL
HEMOGLOBIN: 13.5 G/DL (ref 12–15.6)
LDL CHOLESTEROL: 132 MG/DL (CALC)
LEUKOCYTES, UA: 6 K/MM3 (ref 3.8–10.8)
LYMPHOCYTES ABSOLUTE: 1.4 K/MM3 (ref 0.9–4.1)
LYMPHOCYTES RELATIVE PERCENT: 23.9 % (ref 14–51)
MCH RBC QN AUTO: 29.1 PG (ref 27–33)
MCHC RBC AUTO-ENTMCNC: 33.8 G/DL (ref 32–36)
MCV RBC AUTO: 86 FL (ref 80–100)
MONOCYTES ABSOLUTE: 0.5 K/MM3 (ref 0.2–1.1)
MONOCYTES RELATIVE PERCENT: 8.5 % (ref 0–14)
NEUTROPHILS ABSOLUTE: 3.8 K/MM3 (ref 1.5–7.8)
PDW BLD-RTO: 13.7 % (ref 9–15)
PLATELET # BLD: 167 K/MM3 (ref 130–400)
POTASSIUM SERPL-SCNC: 4 MEQ/L (ref 3.4–5.3)
RBC # BLD: 4.62 M/MM3 (ref 3.9–5.2)
SEGMENTED NEUTROPHILS RELATIVE PERCENT: 63.8 % (ref 40–76)
SODIUM BLD-SCNC: 142 MEQ/L (ref 135–148)
TOTAL PROTEIN: 6.2 GM/DL (ref 6–8.3)
TRIGL SERPL-MCNC: 97 MG/DL
TSH SERPL DL<=0.05 MIU/L-ACNC: 3.28 MICRO IU/ML (ref 0.4–4.5)
VLDLC SERPL CALC-MCNC: 19 MG/DL (CALC) (ref 4–38)

## 2019-06-28 RX ORDER — LEVOTHYROXINE SODIUM 0.1 MG/1
100 TABLET ORAL DAILY
Qty: 90 TABLET | Refills: 1 | Status: SHIPPED | OUTPATIENT
Start: 2019-06-28 | End: 2020-01-06 | Stop reason: SDUPTHER

## 2019-06-28 RX ORDER — ALENDRONATE SODIUM 70 MG/1
70 TABLET ORAL
Qty: 12 TABLET | Refills: 1 | Status: SHIPPED | OUTPATIENT
Start: 2019-06-28 | End: 2020-01-06 | Stop reason: SDUPTHER

## 2019-06-28 RX ORDER — DILTIAZEM HYDROCHLORIDE 240 MG/1
CAPSULE, EXTENDED RELEASE ORAL
Qty: 90 CAPSULE | Refills: 1 | Status: SHIPPED | OUTPATIENT
Start: 2019-06-28 | End: 2019-09-26 | Stop reason: ALTCHOICE

## 2019-09-06 ENCOUNTER — OFFICE VISIT (OUTPATIENT)
Dept: INTERNAL MEDICINE CLINIC | Age: 79
End: 2019-09-06
Payer: COMMERCIAL

## 2019-09-06 VITALS
RESPIRATION RATE: 14 BRPM | OXYGEN SATURATION: 98 % | WEIGHT: 217.4 LBS | DIASTOLIC BLOOD PRESSURE: 70 MMHG | SYSTOLIC BLOOD PRESSURE: 128 MMHG | BODY MASS INDEX: 38.51 KG/M2 | HEART RATE: 62 BPM

## 2019-09-06 DIAGNOSIS — E55.9 VITAMIN D DEFICIENCY: ICD-10-CM

## 2019-09-06 DIAGNOSIS — R60.0 LEG EDEMA: Primary | ICD-10-CM

## 2019-09-06 DIAGNOSIS — I10 ESSENTIAL HYPERTENSION: ICD-10-CM

## 2019-09-06 PROCEDURE — 99213 OFFICE O/P EST LOW 20 MIN: CPT | Performed by: INTERNAL MEDICINE

## 2019-09-06 RX ORDER — FUROSEMIDE 20 MG/1
20 TABLET ORAL 2 TIMES DAILY
Qty: 60 TABLET | Refills: 3 | Status: SHIPPED | OUTPATIENT
Start: 2019-09-06 | End: 2020-11-19 | Stop reason: ALTCHOICE

## 2019-09-06 NOTE — PROGRESS NOTES
behavior is normal.   Vitals reviewed. June lab rev'd       Assessment / Plan:      1. Leg edema    2. Essential hypertension    3. Vitamin D deficiency    4.  Breast cancer screening            Plan   Lasix 20 bid  Watch weight decrease  Limit Na  RTC 3 wk, lab first  Orders Placed This Encounter   Procedures    Basic Metabolic Panel    Vitamin D 25 Hydroxy     She declines further mammograms

## 2019-09-20 ENCOUNTER — TELEPHONE (OUTPATIENT)
Dept: INTERNAL MEDICINE CLINIC | Age: 79
End: 2019-09-20

## 2019-09-20 RX ORDER — AZITHROMYCIN 250 MG/1
250 TABLET, FILM COATED ORAL SEE ADMIN INSTRUCTIONS
Qty: 6 TABLET | Refills: 0 | Status: SHIPPED | OUTPATIENT
Start: 2019-09-20 | End: 2019-09-25

## 2019-09-20 NOTE — TELEPHONE ENCOUNTER
Pt called and stated she has a productive cough, sinus congestion w/ drainage and headache. She would like something sent in for her if possible.  Please advise

## 2019-09-26 ENCOUNTER — OFFICE VISIT (OUTPATIENT)
Dept: INTERNAL MEDICINE CLINIC | Age: 79
End: 2019-09-26
Payer: COMMERCIAL

## 2019-09-26 VITALS
WEIGHT: 216 LBS | RESPIRATION RATE: 16 BRPM | BODY MASS INDEX: 38.26 KG/M2 | DIASTOLIC BLOOD PRESSURE: 78 MMHG | HEART RATE: 68 BPM | SYSTOLIC BLOOD PRESSURE: 120 MMHG

## 2019-09-26 DIAGNOSIS — Z23 NEED FOR INFLUENZA VACCINATION: ICD-10-CM

## 2019-09-26 DIAGNOSIS — I10 ESSENTIAL HYPERTENSION: Primary | ICD-10-CM

## 2019-09-26 DIAGNOSIS — R60.0 LEG EDEMA: ICD-10-CM

## 2019-09-26 LAB
BUN / CREAT RATIO: 17 (CALC) (ref 7–25)
BUN BLDV-MCNC: 20 MG/DL (ref 3–29)
CALCIUM SERPL-MCNC: 8.9 MG/DL (ref 8.5–10.5)
CHLORIDE BLD-SCNC: 102 MEQ/L (ref 96–110)
CO2: 20 MEQ/L (ref 19–32)
CREAT SERPL-MCNC: 1.2 MG/DL
GFR SERPL CREATININE-BSD FRML MDRD: 42 ML/MIN/1.73M2
GLUCOSE BLD-MCNC: 109 MG/DL
POTASSIUM SERPL-SCNC: 4.1 MEQ/L (ref 3.4–5.3)
SODIUM BLD-SCNC: 142 MEQ/L (ref 135–148)
VITAMIN D 25-HYDROXY: 33 NG/ML (ref 20–100)

## 2019-09-26 PROCEDURE — G0008 ADMIN INFLUENZA VIRUS VAC: HCPCS | Performed by: INTERNAL MEDICINE

## 2019-09-26 PROCEDURE — 99213 OFFICE O/P EST LOW 20 MIN: CPT | Performed by: INTERNAL MEDICINE

## 2019-09-26 PROCEDURE — 90653 IIV ADJUVANT VACCINE IM: CPT | Performed by: INTERNAL MEDICINE

## 2019-09-26 RX ORDER — LOSARTAN POTASSIUM 50 MG/1
TABLET ORAL
Qty: 30 TABLET | Refills: 5 | Status: SHIPPED | OUTPATIENT
Start: 2019-09-26 | End: 2019-10-18 | Stop reason: DRUGHIGH

## 2019-09-26 NOTE — PROGRESS NOTES
Colonoscopy due 1/2013    Fracture of left humerus 3/2014    Carozza    Hypertension     Hypothyroidism, postsurgical 1997    Left Thyroid Lobectomy    Lumbar Spinal Stenosis 2006    severe    Multinodular goiter 1996    suppresion and bx negative; s/p thyroid lobectomy    VILLANUEVA (nonalcoholic steatohepatitis) 2005    villanueva vs cirrhosis;  Dr. Aston Soler Necrobiosis lipoidica diabeticorum ([facility] [address]     Obesity, Class II, BMI 35-39.9, with comorbidity     Osteoporosis 1/2015    FRAX 5.5 & 17%    Polymyalgia rheumatica ([facility] [address] 1/2012    Dr Nick Chong R; released her 3/2014    RBBB 02/2017    new finding    Type II Diabetes Mellitus 1994    per pt on 3/14/2019\"they do not consider me diabetic anymore- took me off medication long time ago\"    Urolithiasis     once        Social History     Tobacco Use    Smoking status: Never Smoker    Smokeless tobacco: Never Used   Substance Use Topics    Alcohol use: No        ROS: The patient has had no headache, sore throat, fever or chills, cough, dyspnea, chest pain, nausea, vomiting or diarrhea, or rash       Objective:      /78 (Site: Right Upper Arm, Position: Sitting, Cuff Size: Large Adult)   Pulse 68   Resp 16   Wt 216 lb (98 kg)   BMI 38.26 kg/m²      Physical Exam   Constitutional: She is oriented to person, place, and time. She appears well-developed and well-nourished. No distress. HENT:   Head: Normocephalic and atraumatic. Mouth/Throat: Oropharynx is clear and moist.   Eyes: Conjunctivae are normal. No scleral icterus. Neck: Neck supple. Cardiovascular: Normal rate and regular rhythm. No murmur heard. Pulmonary/Chest: Effort normal. No respiratory distress. She has no wheezes. She has no rales. Abdominal: Soft. She exhibits no distension. There is no tenderness. Musculoskeletal: Normal range of motion. She exhibits edema. 1+ bilateral leg L>R   Neurological: She is alert and oriented to person, place, and time.  Coordination normal.

## 2019-10-15 ENCOUNTER — HOSPITAL ENCOUNTER (OUTPATIENT)
Age: 79
Discharge: HOME | End: 2019-10-15
Payer: COMMERCIAL

## 2019-10-15 LAB
ANION GAP SERPL CALCULATED.3IONS-SCNC: 12 MMOL/L (ref 4–16)
BUN BLDV-MCNC: 24 MG/DL (ref 6–23)
CALCIUM SERPL-MCNC: 8.6 MG/DL (ref 8.3–10.6)
CHLORIDE BLD-SCNC: 105 MMOL/L (ref 99–110)
CO2: 27 MMOL/L (ref 21–32)
CREAT SERPL-MCNC: 1.1 MG/DL (ref 0.6–1.1)
GFR AFRICAN AMERICAN: 58 ML/MIN/1.73M2
GFR NON-AFRICAN AMERICAN: 48 ML/MIN/1.73M2
GLUCOSE BLD-MCNC: 100 MG/DL (ref 70–99)
POTASSIUM SERPL-SCNC: 4.1 MMOL/L (ref 3.5–5.1)
SODIUM BLD-SCNC: 144 MMOL/L (ref 135–145)

## 2019-10-15 PROCEDURE — 80048 BASIC METABOLIC PNL TOTAL CA: CPT

## 2019-10-15 PROCEDURE — 36415 COLL VENOUS BLD VENIPUNCTURE: CPT

## 2019-10-18 ENCOUNTER — OFFICE VISIT (OUTPATIENT)
Dept: INTERNAL MEDICINE CLINIC | Age: 79
End: 2019-10-18
Payer: COMMERCIAL

## 2019-10-18 VITALS
DIASTOLIC BLOOD PRESSURE: 90 MMHG | SYSTOLIC BLOOD PRESSURE: 144 MMHG | WEIGHT: 215.4 LBS | HEART RATE: 86 BPM | BODY MASS INDEX: 38.16 KG/M2

## 2019-10-18 DIAGNOSIS — R60.0 LEG EDEMA, LEFT: Primary | ICD-10-CM

## 2019-10-18 DIAGNOSIS — I10 ESSENTIAL HYPERTENSION: ICD-10-CM

## 2019-10-18 PROCEDURE — 99214 OFFICE O/P EST MOD 30 MIN: CPT | Performed by: INTERNAL MEDICINE

## 2019-10-18 RX ORDER — LOSARTAN POTASSIUM 100 MG/1
100 TABLET ORAL DAILY
Qty: 30 TABLET | Refills: 5 | Status: SHIPPED | OUTPATIENT
Start: 2019-10-18 | End: 2020-05-17 | Stop reason: SDUPTHER

## 2019-11-15 ENCOUNTER — OFFICE VISIT (OUTPATIENT)
Dept: INTERNAL MEDICINE CLINIC | Age: 79
End: 2019-11-15
Payer: COMMERCIAL

## 2019-11-15 VITALS
HEART RATE: 8 BPM | SYSTOLIC BLOOD PRESSURE: 138 MMHG | DIASTOLIC BLOOD PRESSURE: 80 MMHG | BODY MASS INDEX: 37.98 KG/M2 | WEIGHT: 214.4 LBS

## 2019-11-15 DIAGNOSIS — E11.628 TYPE 2 DIABETES MELLITUS WITH OTHER SKIN COMPLICATION, WITHOUT LONG-TERM CURRENT USE OF INSULIN (HCC): ICD-10-CM

## 2019-11-15 DIAGNOSIS — I10 ESSENTIAL HYPERTENSION: Primary | ICD-10-CM

## 2019-11-15 PROCEDURE — 99213 OFFICE O/P EST LOW 20 MIN: CPT | Performed by: INTERNAL MEDICINE

## 2020-01-07 RX ORDER — ALENDRONATE SODIUM 70 MG/1
70 TABLET ORAL
Qty: 12 TABLET | Refills: 1 | Status: SHIPPED | OUTPATIENT
Start: 2020-01-07 | End: 2020-07-11 | Stop reason: SDUPTHER

## 2020-01-07 RX ORDER — LEVOTHYROXINE SODIUM 0.1 MG/1
100 TABLET ORAL DAILY
Qty: 90 TABLET | Refills: 1 | Status: SHIPPED | OUTPATIENT
Start: 2020-01-07 | End: 2020-08-18 | Stop reason: SDUPTHER

## 2020-02-27 ENCOUNTER — OFFICE VISIT (OUTPATIENT)
Dept: INTERNAL MEDICINE CLINIC | Age: 80
End: 2020-02-27
Payer: COMMERCIAL

## 2020-02-27 VITALS
BODY MASS INDEX: 37.27 KG/M2 | HEART RATE: 76 BPM | DIASTOLIC BLOOD PRESSURE: 78 MMHG | WEIGHT: 210.4 LBS | SYSTOLIC BLOOD PRESSURE: 135 MMHG

## 2020-02-27 PROCEDURE — 99213 OFFICE O/P EST LOW 20 MIN: CPT | Performed by: FAMILY MEDICINE

## 2020-02-27 ASSESSMENT — ENCOUNTER SYMPTOMS
CHEST TIGHTNESS: 0
SORE THROAT: 0
DIARRHEA: 0
COLOR CHANGE: 0
SHORTNESS OF BREATH: 0
ABDOMINAL PAIN: 0
VOMITING: 0
CONSTIPATION: 0

## 2020-02-27 ASSESSMENT — PATIENT HEALTH QUESTIONNAIRE - PHQ9
SUM OF ALL RESPONSES TO PHQ9 QUESTIONS 1 & 2: 0
1. LITTLE INTEREST OR PLEASURE IN DOING THINGS: 0
SUM OF ALL RESPONSES TO PHQ QUESTIONS 1-9: 0
SUM OF ALL RESPONSES TO PHQ QUESTIONS 1-9: 0
2. FEELING DOWN, DEPRESSED OR HOPELESS: 0

## 2020-02-27 NOTE — PROGRESS NOTES
Subjective:      Chief Complaint   Patient presents with    Establish Care       HPI:  Naveen Holder is a 78 y.o. female who presents today to establish care with new provider. PMH as below. States BP medications were adjusted a few months ago due to leg swelling. States swelling has improved, is tolerating her new medications without any issues. Does not check her BP at home. Feeling well today, no concerns. Past Medical History:   Diagnosis Date    Family history of abdominal aortic aneurysm     CT Abd Aorta 10/2007 - normal aorta    Family history of colon cancer     Colonoscopy due 1/2013    Fracture of left humerus 3/2014    Carozza    Hypertension     Hypothyroidism, postsurgical 1997    Left Thyroid Lobectomy    Lumbar Spinal Stenosis 2006    severe    Multinodular goiter 1996    suppresion and bx negative; s/p thyroid lobectomy    CHAUDHRY (nonalcoholic steatohepatitis) 2005    chaudhry vs cirrhosis;  Dr. Ray Gamble Necrobiosis lipoidica diabeticorum ([facility] [address]     Obesity, Class II, BMI 35-39.9, with comorbidity     Osteoporosis 1/2015    FRAX 5.5 & 17%    Polymyalgia rheumatica ([facility] [address] 1/2012    Dr Lanie Valle R; released her 3/2014    RBBB 02/2017    new finding    Type II Diabetes Mellitus 1994    per pt on 3/14/2019\"they do not consider me diabetic anymore- took me off medication long time ago\"    Urolithiasis     once        Past Surgical History:   Procedure Laterality Date    ANUS SURGERY  ? when    Anal Fissure Repair    CARPAL TUNNEL RELEASE Right 3/18/2019    RIGHT CARPAL TUNNEL RELEASE performed by Florencio Sheriff [facility] Bilateral 10+ yrs ago    CHOLECYSTECTOMY, [address] [facility]  last one 5+ ys ago     ELBOW FRACTURE SURGERY Left 1/2011    ORIF    KNEE ARTHROSCOPY Right 2008    ORIF HUMERUS DECOMPRESSION Right 12/2006    right humeral fracture    SHOULDER ARTHROSCOPY Right ?when    JANA AND BSO      \"sometime

## 2020-05-18 RX ORDER — LOSARTAN POTASSIUM 100 MG/1
100 TABLET ORAL DAILY
Qty: 30 TABLET | Refills: 5 | Status: SHIPPED | OUTPATIENT
Start: 2020-05-18 | End: 2020-05-19 | Stop reason: SDUPTHER

## 2020-05-19 RX ORDER — LOSARTAN POTASSIUM 100 MG/1
100 TABLET ORAL DAILY
Qty: 90 TABLET | Refills: 1 | Status: SHIPPED | OUTPATIENT
Start: 2020-05-19 | End: 2020-11-19 | Stop reason: ALTCHOICE

## 2020-06-12 ENCOUNTER — TELEPHONE (OUTPATIENT)
Dept: INTERNAL MEDICINE CLINIC | Age: 80
End: 2020-06-12

## 2020-06-12 NOTE — TELEPHONE ENCOUNTER
Pt has been unable to get her Losarten as it is on back order, she has called a few different pharmacies. Can you get her on something else?    Zelalem Hartman and please call her

## 2020-06-15 RX ORDER — LOSARTAN POTASSIUM AND HYDROCHLOROTHIAZIDE 12.5; 5 MG/1; MG/1
1 TABLET ORAL DAILY
Qty: 30 TABLET | Refills: 1 | Status: SHIPPED | OUTPATIENT
Start: 2020-06-15 | End: 2020-08-18 | Stop reason: SDUPTHER

## 2020-06-15 NOTE — TELEPHONE ENCOUNTER
Please notify patient that I have called in a different medication for her (hyzaar), but she will need to monitor her BPs at home since the dosing is different. We will get her back on losartan as soon as it is available again, but in the meantime she will need to contact clinic if her home BPs trend >140/90. If she does not have a BP cuff at home, please schedule her an in person visit with me in the next 2-3 weeks (so I can check her BP). Thank you!

## 2020-06-15 NOTE — TELEPHONE ENCOUNTER
Left a msg stating medication for bp to replace losartan for now has been called in. Advised to monitor bp and give clinic a call if it is >140/90. I also advised to call office and schedule an appt for 2/3 weeks if she has no bp cuff at home.

## 2020-07-13 RX ORDER — ALENDRONATE SODIUM 70 MG/1
70 TABLET ORAL
Qty: 12 TABLET | Refills: 1 | Status: SHIPPED | OUTPATIENT
Start: 2020-07-13 | End: 2021-02-18 | Stop reason: SDUPTHER

## 2020-08-18 RX ORDER — LOSARTAN POTASSIUM AND HYDROCHLOROTHIAZIDE 12.5; 5 MG/1; MG/1
1 TABLET ORAL DAILY
Qty: 90 TABLET | Refills: 1 | Status: SHIPPED | OUTPATIENT
Start: 2020-08-18 | End: 2021-02-06 | Stop reason: SDUPTHER

## 2020-08-18 RX ORDER — LEVOTHYROXINE SODIUM 0.1 MG/1
100 TABLET ORAL DAILY
Qty: 90 TABLET | Refills: 1 | Status: SHIPPED | OUTPATIENT
Start: 2020-08-18 | End: 2021-02-06 | Stop reason: SDUPTHER

## 2020-08-18 NOTE — TELEPHONE ENCOUNTER
3 month supply of synthroid has been provided, but patient is overdue for thyroid labs. They have already been ordered- please instruct patient to get them drawn before the next refill to ensure synthroid dose does not need to be adjusted. Thank you!

## 2020-08-27 ENCOUNTER — OFFICE VISIT (OUTPATIENT)
Dept: INTERNAL MEDICINE CLINIC | Age: 80
End: 2020-08-27
Payer: COMMERCIAL

## 2020-08-27 VITALS
DIASTOLIC BLOOD PRESSURE: 60 MMHG | WEIGHT: 200 LBS | BODY MASS INDEX: 35.43 KG/M2 | OXYGEN SATURATION: 98 % | SYSTOLIC BLOOD PRESSURE: 110 MMHG | HEART RATE: 70 BPM | TEMPERATURE: 97.5 F

## 2020-08-27 PROCEDURE — 99214 OFFICE O/P EST MOD 30 MIN: CPT | Performed by: FAMILY MEDICINE

## 2020-08-27 ASSESSMENT — ENCOUNTER SYMPTOMS
SHORTNESS OF BREATH: 0
DIARRHEA: 0
CHEST TIGHTNESS: 0
CONSTIPATION: 0
COLOR CHANGE: 0
VOMITING: 0
ABDOMINAL PAIN: 0
SORE THROAT: 0

## 2020-08-27 NOTE — PROGRESS NOTES
Subjective:      Chief Complaint   Patient presents with    6 Month Follow-Up       HPI:  Radha Feliciano is a 78 y.o. female who presents today for follow up of chronic conditions as listed below. HTN:  Does not have a BP cuff at home. DM:  Does not check her glucose, is not any medications for DM. Not being followed by any specialists. Has been a little stressed due to family circumstances and is in the middle of a move, but feels she is managing ok. Otherwise feeling well today, no acute concerns. Was not able to therese her labs done. Past Medical History:   Diagnosis Date    Family history of abdominal aortic aneurysm     CT Abd Aorta 10/2007 - normal aorta    Family history of colon cancer     Colonoscopy due 1/2013    Fracture of left humerus 3/2014    Carozza    Hypertension     Hypothyroidism, postsurgical 1997    Left Thyroid Lobectomy    Lumbar Spinal Stenosis 2006    severe    Multinodular goiter 1996    suppresion and bx negative; s/p thyroid lobectomy    CHAUDHRY (nonalcoholic steatohepatitis) 2005    chaudhry vs cirrhosis;  Dr. 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 when    Anal Fissure Repair    CARPAL TUNNEL RELEASE Right 3/18/2019    RIGHT CARPAL TUNNEL RELEASE performed by Morro Delgado MD at [address] [facility] Bilateral 10+ yrs ago    CHOLECYSTECTOMY, [address]  last one 5+ ys ago     ELBOW FRACTURE SURGERY Left 1/2011    ORIF    KNEE ARTHROSCOPY Right 2008    ORIF HUMERUS DECOMPRESSION Right 12/2006    right humeral fracture    SHOULDER ARTHROSCOPY Right ?when    JANA AND BSO      \"sometime in my 50's\"    THYROID LOBECTOMY Left 1995    TONSILLECTOMY  as a child       Social History     Tobacco Use    Smoking status: Never Smoker    Smokeless tobacco: Never Used   Substance Use Topics    Alcohol use: No        Review of Systems   Constitutional: Negative for activity change, appetite change, chills, fever and unexpected weight change. HENT: Negative for congestion and sore throat. Respiratory: Negative for chest tightness and shortness of breath. Cardiovascular: Negative for chest pain and palpitations. Gastrointestinal: Negative for abdominal pain, constipation, diarrhea and vomiting. Genitourinary: Negative for dysuria. Skin: Negative for color change. Neurological: Negative for dizziness and light-headedness. Psychiatric/Behavioral: Negative for dysphoric mood. The patient is not nervous/anxious. Prior to Visit Medications    Medication Sig Taking? Authorizing Provider   levothyroxine (SYNTHROID) 100 MCG tablet Take 1 tablet by mouth daily  Lb Goss MD   losartan-hydroCHLOROthiazide (HYZAAR) 50-12.5 MG per tablet Take 1 tablet by mouth daily  Lb Goss MD   alendronate (FOSAMAX) 70 MG tablet Take 1 tablet by mouth every 7 days  Lb Goss MD   losartan (COZAAR) 100 MG tablet Take 1 tablet by mouth daily  Lb Goss MD   furosemide (LASIX) 20 MG tablet Take 1 tablet by mouth 2 times daily  Patient not taking: Reported on 2/27/2020  Jeffry Rao MD   ammonium lactate (LAC-HYDRIN) 12 % cream Apply as directed  Jeffry Rao MD   Calcium Citrate-Vitamin D (CITRACAL + D PO) Take 500 mg by mouth daily. Historical Provider, MD   Cholecalciferol (VITAMIN D) 2000 UNITS CAPS capsule Take 1 capsule by mouth daily.   Historical Provider, MD          Objective:      /60   Pulse 70   Temp 97.5 °F (36.4 °C)   Wt 200 lb (90.7 kg)   SpO2 98%   Breastfeeding No   BMI 35.43 kg/m²      Physical Exam  Vitals signs and nursing note reviewed. Constitutional:       General: She is not in acute distress. Appearance: Normal appearance. She is not ill-appearing or toxic-appearing. HENT:      Head: Normocephalic and atraumatic. Right Ear: External ear normal.      Left Ear: External ear normal.      Nose: Nose normal.      Mouth/Throat:      Pharynx: Oropharynx is clear. Eyes:      General: No scleral icterus. Right eye: No discharge. Left eye: No discharge. Extraocular Movements: Extraocular movements intact. Conjunctiva/sclera: Conjunctivae normal.   Neck:      Musculoskeletal: Normal range of motion and neck supple. No neck rigidity. Cardiovascular:      Rate and Rhythm: Normal rate and regular rhythm. Heart sounds: Normal heart sounds. Pulmonary:      Effort: Pulmonary effort is normal.      Breath sounds: Normal breath sounds. No wheezing or rales. Musculoskeletal:         General: No deformity. Skin:     General: Skin is warm and dry. Findings: No rash. Neurological:      General: No focal deficit present. Mental Status: She is alert. Mental status is at baseline. Motor: No weakness. Psychiatric:         Mood and Affect: Mood normal.         Behavior: Behavior normal.            Assessment / Plan:      1. Essential hypertension  Stable, well controlled. Continue current meds. 2. Type 2 diabetes mellitus with other skin complication, without long-term current use of insulin (HCC)  Diet controlled. Will continue to monitor HbA1c.      3. Morbidly obese (HCC)  Encouraged lifestyle modifications. Patient voiced understanding and agreement with plan. All questions/concerns were addressed, risks/side effects of medications were reviewed. Return precautions and after visit summary were provided.   Return in about 4 months (around 12/27/2020). or earlier as needed.       Roby Bartholomew MD

## 2020-11-10 ENCOUNTER — TELEPHONE (OUTPATIENT)
Dept: INTERNAL MEDICINE CLINIC | Age: 80
End: 2020-11-10

## 2020-11-16 LAB
BASOPHILS ABSOLUTE: 0.1 /ΜL
BASOPHILS RELATIVE PERCENT: 0.8 %
BUN BLDV-MCNC: 24 MG/DL
CALCIUM SERPL-MCNC: 9 MG/DL
CHLORIDE BLD-SCNC: 101 MMOL/L
CHOLESTEROL, TOTAL: 166 MG/DL
CHOLESTEROL/HDL RATIO: NORMAL
CO2: 27 MMOL/L
CREAT SERPL-MCNC: 1.1 MG/DL
EOSINOPHILS ABSOLUTE: 0.2 /ΜL
EOSINOPHILS RELATIVE PERCENT: 2.3 %
GFR CALCULATED: 54
GLUCOSE BLD-MCNC: 88 MG/DL
HCT VFR BLD CALC: 36.5 % (ref 36–46)
HDLC SERPL-MCNC: 40 MG/DL (ref 35–70)
HEMOGLOBIN: 12.3 G/DL (ref 12–16)
LDL CHOLESTEROL CALCULATED: 105 MG/DL (ref 0–160)
LYMPHOCYTES ABSOLUTE: 1.4 /ΜL
LYMPHOCYTES RELATIVE PERCENT: 18.6 %
MCH RBC QN AUTO: 27.9 PG
MCHC RBC AUTO-ENTMCNC: 33.6 G/DL
MCV RBC AUTO: 82.9 FL
MONOCYTES ABSOLUTE: 0.6 /ΜL
MONOCYTES RELATIVE PERCENT: 7.9 %
NEUTROPHILS ABSOLUTE: 5.3 /ΜL
NEUTROPHILS RELATIVE PERCENT: 70.4 %
NONHDLC SERPL-MCNC: NORMAL MG/DL
PDW BLD-RTO: 13.3 %
PLATELET # BLD: 207 K/ΜL
PMV BLD AUTO: NORMAL FL
POTASSIUM SERPL-SCNC: 4.5 MMOL/L
RBC # BLD: 4.4 10^6/ΜL
SODIUM BLD-SCNC: 140 MMOL/L
TRIGL SERPL-MCNC: 105 MG/DL
VLDLC SERPL CALC-MCNC: 21 MG/DL
WBC # BLD: 7.5 10^3/ML

## 2020-11-19 ENCOUNTER — OFFICE VISIT (OUTPATIENT)
Dept: INTERNAL MEDICINE CLINIC | Age: 80
End: 2020-11-19
Payer: COMMERCIAL

## 2020-11-19 VITALS
HEART RATE: 82 BPM | DIASTOLIC BLOOD PRESSURE: 60 MMHG | WEIGHT: 203 LBS | BODY MASS INDEX: 35.96 KG/M2 | TEMPERATURE: 97.1 F | SYSTOLIC BLOOD PRESSURE: 130 MMHG | OXYGEN SATURATION: 98 %

## 2020-11-19 PROCEDURE — 99214 OFFICE O/P EST MOD 30 MIN: CPT | Performed by: FAMILY MEDICINE

## 2020-11-19 RX ORDER — BACLOFEN 10 MG/1
10 TABLET ORAL NIGHTLY PRN
Qty: 10 TABLET | Refills: 0 | Status: SHIPPED | OUTPATIENT
Start: 2020-11-19 | End: 2021-02-18 | Stop reason: ALTCHOICE

## 2020-11-19 ASSESSMENT — ENCOUNTER SYMPTOMS
SORE THROAT: 0
VOMITING: 0
COLOR CHANGE: 0
CONSTIPATION: 0
SHORTNESS OF BREATH: 0
DIARRHEA: 0
ABDOMINAL PAIN: 0
CHEST TIGHTNESS: 0

## 2020-11-19 NOTE — PROGRESS NOTES
Subjective:      Chief Complaint   Patient presents with    Arm Pain    Other     please clarify blood pressure medications       HPI:  Silvia Hirsch is a 78 y.o. female who presents today for follow up of chronic conditions as listed below. HTN:  Does not have a BP cuff at home. Taking hyzaar. Has been having L shoulder/arm pain for the past few weeks. States she did move recently but she didn't do anything strenuous- no known injury or trauma. Feels fine during the day- only having symptoms at night, wakes her up from sleep. No redness, swelling. Pain starts in shoulder and radiates into forearm. Has tried tylenol, NSAIDs but has not helped. Labs drawn 2 days ago. Otherwise feeling well today, no acute concerns. Past Medical History:   Diagnosis Date    Family history of abdominal aortic aneurysm     CT Abd Aorta 10/2007 - normal aorta    Family history of colon cancer     Colonoscopy due 1/2013    Fracture of left humerus 3/2014    Carozza    Hypertension     Hypothyroidism, postsurgical 1997    Left Thyroid Lobectomy    Lumbar Spinal Stenosis 2006    severe    Multinodular goiter 1996    suppresion and bx negative; s/p thyroid lobectomy    CHAUDHRY (nonalcoholic steatohepatitis) 2005    chaudhry vs cirrhosis;  Dr. 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 when    Anal Fissure Repair    CARPAL TUNNEL RELEASE Right 3/18/2019    RIGHT CARPAL TUNNEL RELEASE performed by Vannesa Hernandez MD at [address] Bilateral 10+ yrs ago    CHOLECYSTECTOMY, [address]   last one 5+ ys ago     ELBOW FRACTURE SURGERY Left 1/2011    ORIF    KNEE ARTHROSCOPY Right 2008    ORIF HUMERUS DECOMPRESSION Right 12/2006    right humeral fracture    SHOULDER ARTHROSCOPY Right ?when    JANA AND BSO      \"sometime in my 50's\"    THYROID LOBECTOMY Left 1995    TONSILLECTOMY  as a child       Social History     Tobacco Use    Smoking status: Never Smoker    Smokeless tobacco: Never Used   Substance Use Topics    Alcohol use: No        Review of Systems   Constitutional: Negative for activity change, appetite change, chills, fever and unexpected weight change. HENT: Negative for congestion and sore throat. Respiratory: Negative for chest tightness and shortness of breath. Cardiovascular: Negative for chest pain and palpitations. Gastrointestinal: Negative for abdominal pain, constipation, diarrhea and vomiting. Genitourinary: Negative for dysuria. Musculoskeletal: Positive for arthralgias and myalgias. Skin: Negative for color change. Neurological: Negative for dizziness and light-headedness. Psychiatric/Behavioral: Negative for dysphoric mood. The patient is not nervous/anxious. Prior to Visit Medications    Medication Sig Taking?  Authorizing Provider   levothyroxine (SYNTHROID) 100 MCG tablet Take 1 tablet by mouth daily  Beth Edge MD   losartan-hydroCHLOROthiazide (HYZAAR) 50-12.5 MG per tablet Take 1 tablet by mouth daily  Beth Edge MD   alendronate (FOSAMAX) 70 MG tablet Take 1 tablet by mouth every 7 days  Beth Edge MD   losartan (COZAAR) 100 MG tablet Take 1 tablet by mouth daily  Beth Edge MD   furosemide (LASIX) 20 MG tablet Take 1 tablet by mouth 2 times daily  Patient not taking: Reported on 2/27/2020  Jeffrey Pereira MD   ammonium lactate (LAC-HYDRIN) 12 % cream Apply as directed  Jeffrey Pereira MD   Calcium Citrate-Vitamin D (CITRACAL + D PO) Take 500 mg by mouth daily. Historical Provider, MD   Cholecalciferol (VITAMIN D) 2000 UNITS CAPS capsule Take 1 capsule by mouth daily. Historical Provider, MD          Objective:      /60   Pulse 82   Temp 97.1 °F (36.2 °C)   Wt 203 lb (92.1 kg)   SpO2 98%   Breastfeeding No   BMI 35.96 kg/m²      Physical Exam  Vitals signs and nursing note reviewed. Constitutional:       General: She is not in acute distress. Appearance: Normal appearance. She is not ill-appearing or toxic-appearing. HENT:      Head: Normocephalic and atraumatic. Right Ear: External ear normal.      Left Ear: External ear normal.      Nose: Nose normal.      Mouth/Throat:      Pharynx: Oropharynx is clear. Eyes:      General: No scleral icterus. Right eye: No discharge. Left eye: No discharge. Extraocular Movements: Extraocular movements intact. Conjunctiva/sclera: Conjunctivae normal.   Neck:      Musculoskeletal: Normal range of motion and neck supple. No neck rigidity. Cardiovascular:      Rate and Rhythm: Normal rate and regular rhythm. Heart sounds: Normal heart sounds. Pulmonary:      Effort: Pulmonary effort is normal.      Breath sounds: Normal breath sounds. No wheezing or rales. Musculoskeletal:         General: No swelling, tenderness, deformity or signs of injury. Comments: Mild tenderness over anterior shoulder; limited abduction but LUCILA same as R side    Skin:     General: Skin is warm and dry. Findings: No rash. Neurological:      General: No focal deficit present. Mental Status: She is alert. Mental status is at baseline. Motor: No weakness. Psychiatric:         Mood and Affect: Mood normal.         Behavior: Behavior normal.            Assessment / Plan:          1. Acute pain of left shoulder  Atraumatic, suspect strain or possible rotator cuff tendonitis.   Patient does not want to start PT yet but is requesting home exercises- provided. Also requesting medication for symptom control- will try low dose muscle relaxant as OTC medications have not helped. Discussed risks/side effects and to only use at night as needed. Contact clinic if symptoms not improving- will refer to PT at that time. - baclofen (LIORESAL) 10 MG tablet; Take 1 tablet by mouth nightly as needed (arm pain)  Dispense: 10 tablet; Refill: 0    2. Essential hypertension  Stable, well controlled. Continue current medications. 3. Hypothyroidism, postsurgical  Labs pending, continue current dose synthroid- will call if labs abnormal.     4. Type 2 diabetes mellitus with other skin complication, without long-term current use of insulin (HCC)  Diet controlled, most recent labs pending. Patient voiced understanding and agreement with plan. All questions/concerns were addressed, risks/side effects of medications were reviewed. Return precautions and after visit summary were provided. Return in about 3 months (around 2/19/2021). or earlier as needed.       Josue Dougherty MD

## 2020-11-19 NOTE — PATIENT INSTRUCTIONS
may need to take a step or two backward. 4. Hold for at least 15 to 30 seconds. Then stand up and relax. If you had stepped back during your stretch, step forward so you can keep your hands on the solid surface. 5. Repeat 2 to 4 times. Shoulder flexion (lying down)   To make a wand for this exercise, use a piece of PVC pipe or a broom handle with the broom removed. Make the wand about a foot wider than your shoulders. 1. Lie on your back, holding a wand with both hands. Your palms should face down as you hold the wand. 2. Keeping your elbows straight, slowly raise your arms over your head. Raise them until you feel a stretch in your shoulders, upper back, and chest.  3. Hold for 15 to 30 seconds. 4. Repeat 2 to 4 times. Shoulder rotation (lying down)   To make a wand for this exercise, use a piece of PVC pipe or a broom handle with the broom removed. Make the wand about a foot wider than your shoulders. 1. Lie on your back. Hold a wand with both hands with your elbows bent and palms up. 2. Keep your elbows close to your body, and move the wand across your body toward the sore arm. 3. Hold for 8 to 12 seconds. 4. Repeat 2 to 4 times. Shoulder blade squeeze   1. Stand with your arms at your sides, and squeeze your shoulder blades together. Do not raise your shoulders up as you squeeze. 2. Hold 6 seconds. 3. Repeat 8 to 12 times. Shoulder flexor and extensor exercise   These are isometric exercises. That means you contract your muscles without actually moving. 1. Push forward (flex): Stand facing a wall or doorjamb, about 6 inches or less back. Hold your injured arm against your body. Make a closed fist with your thumb on top. Then gently push your hand forward into the wall with about 25% to 50% of your strength. Don't let your body move backward as you push. Hold for about 6 seconds. Relax for a few seconds. Repeat 8 to 12 times.   2. Push backward (extend): Stand with your back flat against a wall. Your upper arm should be against the wall, with your elbow bent 90 degrees (your hand straight ahead). Push your elbow gently back against the wall with about 25% to 50% of your strength. Don't let your body move forward as you push. Hold for about 6 seconds. Relax for a few seconds. Repeat 8 to 12 times. Scapular exercise: Wall push-ups   This exercise is best done with your fingers somewhat turned out, rather than straight up and down. 1. Stand facing a wall, about 12 inches to 18 inches away. 2. Place your hands on the wall at shoulder height. 3. Slowly bend your elbows and bring your face to the wall. Keep your back and hips straight. 4. Push back to where you started. 5. Repeat 8 to 12 times. 6. When you can do this exercise against a wall comfortably, you can try it against a counter. You can then slowly progress to the end of a couch, then to a sturdy chair, and finally to the floor. Scapular exercise: Retraction   For this exercise, you will need elastic exercise material, such as surgical tubing or Thera-Band. 1. Put the band around a solid object at about waist level. (A bedpost will work well.) Each hand should hold an end of the band. 2. With your elbows at your sides and bent to 90 degrees, pull the band back. Your shoulder blades should move toward each other. Then move your arms back where you started. 3. Repeat 8 to 12 times. 4. If you have good range of motion in your shoulders, try this exercise with your arms lifted out to the sides. Keep your elbows at a 90-degree angle. Raise the elastic band up to about shoulder level. Pull the band back to move your shoulder blades toward each other. Then move your arms back where you started. Internal rotator strengthening exercise   1. Start by tying a piece of elastic exercise material to a doorknob. You can use surgical tubing or Thera-Band. 2. Stand or sit with your shoulder relaxed and your elbow bent 90 degrees.  Your upper arm instructions adapted under license by [facility] ([facility]). If you have questions about a medical condition or this instruction, always ask your healthcare professional. [address] any warranty or liability for your use of this information.

## 2020-11-24 ENCOUNTER — TELEPHONE (OUTPATIENT)
Dept: INTERNAL MEDICINE CLINIC | Age: 80
End: 2020-11-24

## 2021-01-28 ENCOUNTER — TELEPHONE (OUTPATIENT)
Dept: INTERNAL MEDICINE CLINIC | Age: 81
End: 2021-01-28

## 2021-01-28 DIAGNOSIS — M25.511 ACUTE PAIN OF RIGHT SHOULDER: Primary | ICD-10-CM

## 2021-01-28 DIAGNOSIS — R07.81 RIB PAIN ON RIGHT SIDE: ICD-10-CM

## 2021-01-28 RX ORDER — LIDOCAINE 50 MG/G
1 PATCH TOPICAL DAILY
Qty: 15 PATCH | Refills: 0 | Status: SHIPPED | OUTPATIENT
Start: 2021-01-28 | End: 2021-02-12

## 2021-01-28 NOTE — TELEPHONE ENCOUNTER
Pt states she fell last Thursday in her garage. She tripped and fell forward on her chest. She has a small bump on her head but her chest took the majority of the impact. She stated she is not sure if she need any imaging if so [facility].

## 2021-01-28 NOTE — TELEPHONE ENCOUNTER
Spoke with patient. States she tripped and fell in her garage 1 week ago. Symptoms have been gradually improving, but still having pain in her right shoulder and right ribs (under her axilla). Has been using Tylenol and Aleve with minimal improvement. Denying any troubles breathing. We will order right shoulder and right rib x-ray, and try Lidoderm patches for symptom control. Will contact patient with x-ray results. Discussed that symptoms may take a few weeks to resolve but to contact clinic for any new or worsening symptoms.

## 2021-01-28 NOTE — TELEPHONE ENCOUNTER
If she is still having significant pain/symptoms, we can order imaging but if she feels she is mostly recovered, imaging probably wouldn't show too much.

## 2021-02-08 RX ORDER — LOSARTAN POTASSIUM AND HYDROCHLOROTHIAZIDE 12.5; 5 MG/1; MG/1
1 TABLET ORAL DAILY
Qty: 90 TABLET | Refills: 1 | Status: ON HOLD
Start: 2021-02-08 | End: 2021-03-22 | Stop reason: HOSPADM

## 2021-02-08 RX ORDER — LEVOTHYROXINE SODIUM 0.1 MG/1
100 TABLET ORAL DAILY
Qty: 90 TABLET | Refills: 1 | Status: SHIPPED | OUTPATIENT
Start: 2021-02-08

## 2021-02-18 ENCOUNTER — OFFICE VISIT (OUTPATIENT)
Dept: INTERNAL MEDICINE CLINIC | Age: 81
End: 2021-02-18
Payer: COMMERCIAL

## 2021-02-18 VITALS
WEIGHT: 193.6 LBS | TEMPERATURE: 97.2 F | SYSTOLIC BLOOD PRESSURE: 114 MMHG | BODY MASS INDEX: 34.3 KG/M2 | DIASTOLIC BLOOD PRESSURE: 68 MMHG | HEIGHT: 63 IN | HEART RATE: 92 BPM | OXYGEN SATURATION: 98 %

## 2021-02-18 DIAGNOSIS — M81.0 OSTEOPOROSIS WITHOUT CURRENT PATHOLOGICAL FRACTURE, UNSPECIFIED OSTEOPOROSIS TYPE: ICD-10-CM

## 2021-02-18 DIAGNOSIS — I10 ESSENTIAL HYPERTENSION: ICD-10-CM

## 2021-02-18 DIAGNOSIS — R07.81 RIB PAIN ON RIGHT SIDE: ICD-10-CM

## 2021-02-18 DIAGNOSIS — E11.628 TYPE 2 DIABETES MELLITUS WITH OTHER SKIN COMPLICATION, WITHOUT LONG-TERM CURRENT USE OF INSULIN (HCC): ICD-10-CM

## 2021-02-18 DIAGNOSIS — E78.49 OTHER HYPERLIPIDEMIA: ICD-10-CM

## 2021-02-18 DIAGNOSIS — E55.9 VITAMIN D DEFICIENCY: ICD-10-CM

## 2021-02-18 DIAGNOSIS — E89.0 HYPOTHYROIDISM, POSTSURGICAL: Primary | ICD-10-CM

## 2021-02-18 PROCEDURE — 99214 OFFICE O/P EST MOD 30 MIN: CPT | Performed by: FAMILY MEDICINE

## 2021-02-18 RX ORDER — ALENDRONATE SODIUM 70 MG/1
70 TABLET ORAL
Qty: 12 TABLET | Refills: 1 | Status: ON HOLD
Start: 2021-02-18 | End: 2021-06-05 | Stop reason: HOSPADM

## 2021-02-18 RX ORDER — LIDOCAINE 50 MG/G
1 PATCH TOPICAL DAILY
Qty: 20 PATCH | Refills: 0 | Status: SHIPPED | OUTPATIENT
Start: 2021-02-18 | End: 2021-02-28

## 2021-02-18 ASSESSMENT — ENCOUNTER SYMPTOMS
VOMITING: 0
CHEST TIGHTNESS: 0
DIARRHEA: 0
CONSTIPATION: 0
SHORTNESS OF BREATH: 0
ABDOMINAL PAIN: 0
COLOR CHANGE: 0
SORE THROAT: 0

## 2021-02-18 ASSESSMENT — PATIENT HEALTH QUESTIONNAIRE - PHQ9
SUM OF ALL RESPONSES TO PHQ QUESTIONS 1-9: 1

## 2021-02-18 NOTE — PROGRESS NOTES
Subjective:      Chief Complaint   Patient presents with    3 Month Follow-Up    Fall     Pt reports fall 3-4 weeks ago, and is still having R sided (rib) pain.  Other     Pt is requesting handi-cap iraidaard. HPI:  Gabe Kaminski is a 78 y.o. female who presents today for follow up of chronic conditions as listed below. Tripped and fell in her garage about 3-4 weeks ago. States she fell onto R side (ribs), did not get evaluated. Symptoms have slowly been improving, but still having some R sided rib pain. No difficulties breathing. Tried some cream but did not help. Has been using heating pad. HTN:  Does not check her BP's at home. Otherwise feeling well today, no acute concerns. Labs reviewed. Past Medical History:   Diagnosis Date    Family history of abdominal aortic aneurysm     CT Abd Aorta 10/2007 - normal aorta    Family history of colon cancer     Colonoscopy due 1/2013    Fracture of left humerus 3/2014    Carozza    Hypertension     Hypothyroidism, postsurgical 1997    Left Thyroid Lobectomy    Lumbar Spinal Stenosis 2006    severe    Multinodular goiter 1996    suppresion and bx negative; s/p thyroid lobectomy    CHAUDHRY (nonalcoholic steatohepatitis) 2005    chaudhry vs cirrhosis;  Dr. Bi Phillips Necrobiosis lipoidica diabeticorum ([facility] [address]     Obesity, Class II, BMI 35-39.9, with comorbidity     Osteoporosis 1/2015    FRAX 5.5 & 17%    Polymyalgia rheumatica ([facility] [address] 1/2012    Dr Pooja Bowser R; released her 3/2014    RBBB 02/2017    new finding    Type II Diabetes Mellitus 1994    per pt on 3/14/2019\"they do not consider me diabetic anymore- took me off medication long time ago\"    Urolithiasis     once        Past Surgical History:   Procedure Laterality Date    ANUS SURGERY  ? when    Anal Fissure Repair    CARPAL TUNNEL RELEASE Right 3/18/2019    [facility]AL [facility]   [address] [facility] [place] [address]   last one 5+ ys ago     ELBOW FRACTURE SURGERY Left 1/2011    ORIF    KNEE ARTHROSCOPY Right 2008    ORIF HUMERUS DECOMPRESSION Right 12/2006    right humeral fracture    SHOULDER ARTHROSCOPY Right ?when    JANA AND BSO      \"sometime in my 50's\"    THYROID LOBECTOMY Left 1995    TONSILLECTOMY  as a child       Social History     Tobacco Use    Smoking status: Never Smoker    Smokeless tobacco: Never Used   Substance Use Topics    Alcohol use: No        Review of Systems   Constitutional: Negative for activity change, appetite change, chills, fever and unexpected weight change. HENT: Negative for congestion and sore throat. Respiratory: Negative for chest tightness and shortness of breath. Cardiovascular: Negative for chest pain and palpitations. Gastrointestinal: Negative for abdominal pain, constipation, diarrhea and vomiting. Genitourinary: Negative for dysuria. Musculoskeletal: Positive for myalgias. Skin: Negative for color change. Neurological: Negative for dizziness and light-headedness. Psychiatric/Behavioral: Negative for dysphoric mood. The patient is not nervous/anxious. Prior to Visit Medications    Medication Sig Taking? Authorizing Provider   levothyroxine (SYNTHROID) 100 MCG tablet Take 1 tablet by mouth daily Yes Marisel Carter MD   losartan-hydroCHLOROthiazide (HYZAAR) 50-12.5 MG per tablet Take 1 tablet by mouth daily Yes Marisel Carter MD   alendronate (FOSAMAX) 70 MG tablet Take 1 tablet by mouth every 7 days Yes Marisel Carter MD   Cholecalciferol (VITAMIN D) 2000 UNITS CAPS capsule Take 1 capsule by mouth daily. Yes Historical Provider, MD   baclofen (LIORESAL) 10 MG tablet Take 1 tablet by mouth nightly as needed (arm pain)  Marisel Carter MD   Calcium Citrate-Vitamin D (CITRACAL + D PO) Take 500 mg by mouth daily.   Historical Provider, MD          Objective:      BP

## 2021-03-15 ENCOUNTER — TELEPHONE (OUTPATIENT)
Dept: INTERNAL MEDICINE CLINIC | Age: 81
End: 2021-03-15
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
## 2021-03-15 NOTE — TELEPHONE ENCOUNTER
Patient preferred not to get one completed at her last appointment, but if she is still having symptoms from her fall, I would recommend her getting a CXR to make sure she did not sustain a bigger injury than we think. The order is in- I will call her as soon as I get the XR results back and we can discuss what needs to be done from there. Please let me know if she really prefers not to get the XR done, I can call her and discuss- thanks!

## 2021-03-15 NOTE — TELEPHONE ENCOUNTER
Pt left a msg for the office to give her a call. I called pt back and she stated she took a fall abut 5 weeks ago and just doesn't seem to be able to get right. She has not energy or appetite. She is not in any pain but just cant seem to get going wanted to see if you recommended anything.

## 2021-03-16 NOTE — TELEPHONE ENCOUNTER
Spoke with patient. States she has just been feeling more fatigued recently- states symptoms did seem to start after her fall, but does not think it is related. Reports all of her pain from the fall has resolved. Still eating normally, denies any other new/associated symptoms. Already ordered comprehensive labs at her last appointment (CBC, CMP, A1C, TSH, vitamin D, etc). Advised to get those completed to screen for causes of fatigue. Will contact patient with results and treat as indicated (or discuss further evaluation if labs negative). Contact clinic in the meantime for any acute/worsening symptoms. Patient voiced understanding.

## 2021-03-18 ENCOUNTER — APPOINTMENT (OUTPATIENT)
Dept: CT IMAGING | Age: 81
End: 2021-03-18
Payer: COMMERCIAL

## 2021-03-18 ENCOUNTER — HOSPITAL ENCOUNTER (OUTPATIENT)
Age: 81
Setting detail: OBSERVATION
Discharge: HOME HEALTH | End: 2021-03-22
Attending: INTERNAL MEDICINE | Admitting: INTERNAL MEDICINE
Payer: COMMERCIAL

## 2021-03-18 ENCOUNTER — APPOINTMENT (OUTPATIENT)
Dept: GENERAL RADIOLOGY | Age: 81
End: 2021-03-18
Payer: COMMERCIAL

## 2021-03-18 DIAGNOSIS — E87.6 HYPOKALEMIA: ICD-10-CM

## 2021-03-18 DIAGNOSIS — R53.1 GENERAL WEAKNESS: Primary | ICD-10-CM

## 2021-03-18 DIAGNOSIS — R77.8 ELEVATED TROPONIN: ICD-10-CM

## 2021-03-18 DIAGNOSIS — N17.9 AKI (ACUTE KIDNEY INJURY) (HCC): ICD-10-CM

## 2021-03-18 DIAGNOSIS — R82.998 LEUKOCYTES IN URINE: ICD-10-CM

## 2021-03-18 DIAGNOSIS — R79.89 ELEVATED BRAIN NATRIURETIC PEPTIDE (BNP) LEVEL: ICD-10-CM

## 2021-03-18 PROBLEM — N30.00 ACUTE CYSTITIS WITHOUT HEMATURIA: Status: ACTIVE | Noted: 2021-03-18

## 2021-03-18 LAB
ALBUMIN SERPL-MCNC: 3 GM/DL (ref 3.4–5)
ALP BLD-CCNC: 46 IU/L (ref 40–129)
ALT SERPL-CCNC: 12 U/L (ref 10–40)
ANION GAP SERPL CALCULATED.3IONS-SCNC: 12 MMOL/L (ref 4–16)
AST SERPL-CCNC: 18 IU/L (ref 15–37)
BACTERIA: NEGATIVE /HPF
BASOPHILS ABSOLUTE: 0.1 K/CU MM
BASOPHILS RELATIVE PERCENT: 0.6 % (ref 0–1)
BILIRUB SERPL-MCNC: 1 MG/DL (ref 0–1)
BILIRUBIN URINE: NEGATIVE MG/DL
BLOOD, URINE: NEGATIVE
BUN BLDV-MCNC: 32 MG/DL (ref 6–23)
CALCIUM SERPL-MCNC: 8.6 MG/DL (ref 8.3–10.6)
CHLORIDE BLD-SCNC: 98 MMOL/L (ref 99–110)
CHP ED QC CHECK: YES
CLARITY: ABNORMAL
CO2: 27 MMOL/L (ref 21–32)
COLOR: YELLOW
CREAT SERPL-MCNC: 1.2 MG/DL (ref 0.6–1.1)
DIFFERENTIAL TYPE: ABNORMAL
EOSINOPHILS ABSOLUTE: 0.2 K/CU MM
EOSINOPHILS RELATIVE PERCENT: 1.5 % (ref 0–3)
ESTIMATED AVERAGE GLUCOSE: 105 MG/DL
FOLATE: 13.2 NG/ML (ref 3.1–17.5)
GFR AFRICAN AMERICAN: 52 ML/MIN/1.73M2
GFR NON-AFRICAN AMERICAN: 43 ML/MIN/1.73M2
GLUCOSE BLD-MCNC: 107 MG/DL
GLUCOSE BLD-MCNC: 107 MG/DL (ref 70–99)
GLUCOSE, URINE: NEGATIVE MG/DL
HBA1C MFR BLD: 5.3 % (ref 4.2–6.3)
HCT VFR BLD CALC: 39 % (ref 37–47)
HEMOGLOBIN: 12.7 GM/DL (ref 12.5–16)
HYALINE CASTS: 2 /LPF
IMMATURE NEUTROPHIL %: 0.5 % (ref 0–0.43)
KETONES, URINE: NEGATIVE MG/DL
LEUKOCYTE ESTERASE, URINE: ABNORMAL
LYMPHOCYTES ABSOLUTE: 1.6 K/CU MM
LYMPHOCYTES RELATIVE PERCENT: 16.9 % (ref 24–44)
MAGNESIUM: 2.2 MG/DL (ref 1.8–2.4)
MCH RBC QN AUTO: 28.9 PG (ref 27–31)
MCHC RBC AUTO-ENTMCNC: 32.6 % (ref 32–36)
MCV RBC AUTO: 88.8 FL (ref 78–100)
MONOCYTES ABSOLUTE: 0.8 K/CU MM
MONOCYTES RELATIVE PERCENT: 7.8 % (ref 0–4)
MUCUS: ABNORMAL HPF
NITRITE URINE, QUANTITATIVE: NEGATIVE
NUCLEATED RBC %: 0 %
PDW BLD-RTO: 13.8 % (ref 11.7–14.9)
PH, URINE: 6 (ref 5–8)
PLATELET # BLD: 191 K/CU MM (ref 140–440)
PMV BLD AUTO: 10.7 FL (ref 7.5–11.1)
POTASSIUM SERPL-SCNC: 3.4 MMOL/L (ref 3.5–5.1)
PRO-BNP: ABNORMAL PG/ML
PROTEIN UA: NEGATIVE MG/DL
RBC # BLD: 4.39 M/CU MM (ref 4.2–5.4)
RBC URINE: 2 /HPF (ref 0–6)
SARS-COV-2, NAAT: NOT DETECTED
SEGMENTED NEUTROPHILS ABSOLUTE COUNT: 7 K/CU MM
SEGMENTED NEUTROPHILS RELATIVE PERCENT: 72.7 % (ref 36–66)
SODIUM BLD-SCNC: 137 MMOL/L (ref 135–145)
SOURCE: NORMAL
SPECIFIC GRAVITY UA: 1.02 (ref 1–1.03)
SQUAMOUS EPITHELIAL: 6 /HPF
T4 FREE: 2.15 NG/DL (ref 0.9–1.8)
TOTAL CK: 45 IU/L (ref 26–140)
TOTAL IMMATURE NEUTOROPHIL: 0.05 K/CU MM
TOTAL NUCLEATED RBC: 0 K/CU MM
TOTAL PROTEIN: 5.5 GM/DL (ref 6.4–8.2)
TRANSITIONAL EPITHELIAL: <1 /HPF
TRICHOMONAS: ABNORMAL /HPF
TROPONIN T: 0.06 NG/ML
TROPONIN T: 0.07 NG/ML
TSH HIGH SENSITIVITY: 2.85 UIU/ML (ref 0.27–4.2)
UROBILINOGEN, URINE: NEGATIVE MG/DL (ref 0.2–1)
VITAMIN B-12: 487.1 PG/ML (ref 211–911)
VITAMIN D 25-HYDROXY: 39.3 NG/ML
WBC # BLD: 9.7 K/CU MM (ref 4–10.5)
WBC UA: 18 /HPF (ref 0–5)

## 2021-03-18 PROCEDURE — 36415 COLL VENOUS BLD VENIPUNCTURE: CPT

## 2021-03-18 PROCEDURE — 71045 X-RAY EXAM CHEST 1 VIEW: CPT

## 2021-03-18 PROCEDURE — 82607 VITAMIN B-12: CPT

## 2021-03-18 PROCEDURE — 80053 COMPREHEN METABOLIC PANEL: CPT

## 2021-03-18 PROCEDURE — 2580000003 HC RX 258: Performed by: PHYSICIAN ASSISTANT

## 2021-03-18 PROCEDURE — 96365 THER/PROPH/DIAG IV INF INIT: CPT

## 2021-03-18 PROCEDURE — 82306 VITAMIN D 25 HYDROXY: CPT

## 2021-03-18 PROCEDURE — 82550 ASSAY OF CK (CPK): CPT

## 2021-03-18 PROCEDURE — 87086 URINE CULTURE/COLONY COUNT: CPT

## 2021-03-18 PROCEDURE — 84439 ASSAY OF FREE THYROXINE: CPT

## 2021-03-18 PROCEDURE — 81001 URINALYSIS AUTO W/SCOPE: CPT

## 2021-03-18 PROCEDURE — 99284 EMERGENCY DEPT VISIT MOD MDM: CPT

## 2021-03-18 PROCEDURE — 70450 CT HEAD/BRAIN W/O DYE: CPT

## 2021-03-18 PROCEDURE — 87635 SARS-COV-2 COVID-19 AMP PRB: CPT

## 2021-03-18 PROCEDURE — G0378 HOSPITAL OBSERVATION PER HR: HCPCS

## 2021-03-18 PROCEDURE — 83735 ASSAY OF MAGNESIUM: CPT

## 2021-03-18 PROCEDURE — 83880 ASSAY OF NATRIURETIC PEPTIDE: CPT

## 2021-03-18 PROCEDURE — 85025 COMPLETE CBC W/AUTO DIFF WBC: CPT

## 2021-03-18 PROCEDURE — 96372 THER/PROPH/DIAG INJ SC/IM: CPT

## 2021-03-18 PROCEDURE — 6370000000 HC RX 637 (ALT 250 FOR IP): Performed by: NURSE PRACTITIONER

## 2021-03-18 PROCEDURE — 94761 N-INVAS EAR/PLS OXIMETRY MLT: CPT

## 2021-03-18 PROCEDURE — 83036 HEMOGLOBIN GLYCOSYLATED A1C: CPT

## 2021-03-18 PROCEDURE — U0002 COVID-19 LAB TEST NON-CDC: HCPCS

## 2021-03-18 PROCEDURE — 84484 ASSAY OF TROPONIN QUANT: CPT

## 2021-03-18 PROCEDURE — 82746 ASSAY OF FOLIC ACID SERUM: CPT

## 2021-03-18 PROCEDURE — 93005 ELECTROCARDIOGRAM TRACING: CPT | Performed by: PHYSICIAN ASSISTANT

## 2021-03-18 PROCEDURE — 6360000002 HC RX W HCPCS: Performed by: PHYSICIAN ASSISTANT

## 2021-03-18 PROCEDURE — 84443 ASSAY THYROID STIM HORMONE: CPT

## 2021-03-18 PROCEDURE — 6360000002 HC RX W HCPCS: Performed by: NURSE PRACTITIONER

## 2021-03-18 RX ORDER — PROMETHAZINE HYDROCHLORIDE 12.5 MG/1
12.5 TABLET ORAL EVERY 6 HOURS PRN
Status: DISCONTINUED | OUTPATIENT
Start: 2021-03-18 | End: 2021-03-22 | Stop reason: HOSPADM

## 2021-03-18 RX ORDER — ACETAMINOPHEN 325 MG/1
650 TABLET ORAL EVERY 6 HOURS PRN
Status: DISCONTINUED | OUTPATIENT
Start: 2021-03-18 | End: 2021-03-22 | Stop reason: HOSPADM

## 2021-03-18 RX ORDER — SODIUM CHLORIDE 0.9 % (FLUSH) 0.9 %
10 SYRINGE (ML) INJECTION EVERY 12 HOURS SCHEDULED
Status: DISCONTINUED | OUTPATIENT
Start: 2021-03-18 | End: 2021-03-22 | Stop reason: HOSPADM

## 2021-03-18 RX ORDER — ONDANSETRON 2 MG/ML
4 INJECTION INTRAMUSCULAR; INTRAVENOUS EVERY 6 HOURS PRN
Status: DISCONTINUED | OUTPATIENT
Start: 2021-03-18 | End: 2021-03-22 | Stop reason: HOSPADM

## 2021-03-18 RX ORDER — LEVOTHYROXINE SODIUM 0.1 MG/1
100 TABLET ORAL DAILY
Status: DISCONTINUED | OUTPATIENT
Start: 2021-03-18 | End: 2021-03-22 | Stop reason: HOSPADM

## 2021-03-18 RX ORDER — SODIUM CHLORIDE 9 MG/ML
INJECTION, SOLUTION INTRAVENOUS CONTINUOUS
Status: DISCONTINUED | OUTPATIENT
Start: 2021-03-18 | End: 2021-03-20

## 2021-03-18 RX ORDER — POLYETHYLENE GLYCOL 3350 17 G/17G
17 POWDER, FOR SOLUTION ORAL DAILY PRN
Status: DISCONTINUED | OUTPATIENT
Start: 2021-03-18 | End: 2021-03-22 | Stop reason: HOSPADM

## 2021-03-18 RX ORDER — SODIUM CHLORIDE 0.9 % (FLUSH) 0.9 %
10 SYRINGE (ML) INJECTION PRN
Status: DISCONTINUED | OUTPATIENT
Start: 2021-03-18 | End: 2021-03-22 | Stop reason: HOSPADM

## 2021-03-18 RX ADMIN — CEFTRIAXONE SODIUM 1000 MG: 1 INJECTION, POWDER, FOR SOLUTION INTRAMUSCULAR; INTRAVENOUS at 14:16

## 2021-03-18 RX ADMIN — SODIUM CHLORIDE: 9 INJECTION, SOLUTION INTRAVENOUS at 11:30

## 2021-03-18 RX ADMIN — Medication 2000 UNITS: at 17:12

## 2021-03-18 RX ADMIN — ENOXAPARIN SODIUM 40 MG: 40 INJECTION SUBCUTANEOUS at 17:12

## 2021-03-18 RX ADMIN — LEVOTHYROXINE SODIUM 100 MCG: 100 TABLET ORAL at 17:12

## 2021-03-18 NOTE — PROGRESS NOTES
Medication History  [facility]    Patient Name: Luis Barnett 1940     Medication history has been completed by: Santiago Figueroa CPhT    Source(s) of information: patient, family and insurance claims     Primary Care Physician: Kaleb Pacheco MD     Ph[facility]: [facility]    Allergies as of 03/18/2021 - Review Complete 03/18/2021   Allergen Reaction Noted    Penicillins Rash 10/26/2010        Prior to Admission medications    Medication Sig Start Date End Date Taking? Authorizing Provider   alendronate (FOSAMAX) 70 MG tablet Take 70 mg by mouth every 7 days Mondays 2/18/21  Yes Kaleb Pacheco MD   levothyroxine (SYNTHROID) 100 MCG tablet Take 1 tablet by mouth daily 2/8/21  Yes Kaleb Pacheco MD   losartan-hydroCHLOROthiazide [facility]) 50-12.5 MG per tablet Take 1 tablet by mouth daily 2/8/21  Yes Kaleb Pacheco MD   Cholecalciferol (VITAMIN D) 2000 UNITS CAPS capsule Take 1 capsule by mouth daily. Yes Historical Provider, MD   Handicap Placard MISC by Does not apply route Good for 5 years 2/18/21   Kaleb Pacheco MD     Comments:  Medication list reviewed with patient and insurance claims verified. Patient report she has taken no meds piror to ER visit.     To my knowledge the above medication history is accurate as of 3/18/2021 1:05 PM.   Santiago Figueroa CPhT   3/18/2021 1:05 PM

## 2021-03-18 NOTE — LETTER
[facility] [address]  [address] [place] [address]  Phone: [phone]             March 19, 2021    Patient: Lois Gregory   YOB: 1940   Date of Visit: 3/18/2021       To Whom It May Concern: Sheryl Gold was seen and treated in our facility  beginning 3/18/2021 until 3/19/2021. She may return to work on 3/24/2021.       Sincerely,       Michelle Mckoy RN         Signature:__________________________________

## 2021-03-18 NOTE — ED PROVIDER NOTES
EMERGENCY DEPARTMENT ENCOUNTER    [facility] EMERGENCY DEPARTMENT        TRIAGE CHIEF COMPLAINT:   Fatigue (decline in health over recent months)      [place]:  Donya Ward is a 78 y.o. female that presents with sister-in-law for generalized fatigue and weakness. Onset is prior to arrival, x2 months ago. Context is patient states that she lives at home with her brother, typically very active and does not utilize any walking aids. She states 6 weeks ago, she was about to go to get her first COVID-19 vaccine when she had a mechanical trip and uneven ground in her garage. She did fall forward, flat on her chest but adamantly denies any head injury or loss of consciousness. States that she was sore for several weeks but this has since resolved. However since her fall, patient has had progressively worsening generalized fatigue and weakness. She is less active in the home and is now having to use a walker which does help with her ambulation and strength. Sister-in-law noticed that she appeared more weak but no other changes in baseline mental status or acute confusion and recommended that she come to the ED for evaluation. Patient states since the fall she has had intermittent \"tingling\" in the extremities without associated neck or back pain. No associated exertional chest pain shortness of breath, hemoptysis, dizziness lightheadedness or near syncope. She feels globally weak, no focal extremity weakness or tingling. Family has not noted any slurred speech, facial droop or localized extremity weakness. Patient denies any fever or chills, cough, shortness of breath. Patient does state that she has been having a normal appetite however sister-in-law says she has not been eating and drinking normally. Patient has had increased urinary frequency especially over the night without dysuria hematuria.   No other changes in bowel movements, no new black tarry stools or bright red blood per rectum. Patient has a diagnosed history of diabetes however states that she was taken off all of her diabetic medications after losing weight and has not been checking her sugars at home. Denying any headache dizziness or lightheadedness. ROS:  General:  No fevers, no chills, +generalized weakness  Cardiovascular:  No chest pain, no palpitations  Respiratory:  No shortness of breath, no cough, no wheezing  Gastrointestinal:  No pain, no nausea, no vomiting, no diarrhea  Musculoskeletal:  No muscle pain, no joint pain  Skin:  No rash, no pruritis, no easy bruising  Neurologic:  No speech problems, no headache, no extremity numbness,  no extremity weakness  Psychiatric:  No anxiety  Genitourinary:  No dysuria, no hematuria  Extremities:  No edema    Past Medical History:   Diagnosis Date    Family history of abdominal aortic aneurysm     CT Abd Aorta 10/2007 - normal aorta    Family history of colon cancer     Colonoscopy due 1/2013    Fracture of left humerus 3/2014    [place]    Hypertension     Hypothyroidism, postsurgical 1997    Left Thyroid Lobectomy    Lumbar Spinal Stenosis 2006    severe    Multinodular goiter 1996    suppresion and bx negative; s/p thyroid lobectomy    CHAUDHRY (nonalcoholic steatohepatitis) 2005    chaudhry vs cirrhosis;  Dr. Laura Farfan Necrobiosis lipoidica diabeticorum ([facility] [address]     Obesity, Class II, BMI 35-39.9, with comorbidity     Osteoporosis 1/2015    FRAX 5.5 & 17%    Polymyalgia rheumatica ([facility] [address] 1/2012    Dr Kenneth Horner R; released her 3/2014    RBBB 02/2017    new finding    Type II Diabetes Mellitus 1994    per pt on 3/14/2019\"they do not consider me diabetic anymore- took me off medication long time ago\"    Urolithiasis     once     Past Surgical History:   Procedure Laterality Date    ANUS SURGERY  ? when    Anal Fissure Repair    CARPAL TUNNEL RELEASE Right 3/18/2019    RIGHT CARPAL TUNNEL RELEASE performed by Tru [facility]jivar [facility]  CATARACT REMOVAL WITH IMPLANT Bilateral 10+ yrs ago    CHOLECYSTECTOMY, [address]  last one 5+ ys ago     ELBOW FRACTURE SURGERY Left 1/2011    ORIF    KNEE ARTHROSCOPY Right 2008    ORIF HUMERUS DECOMPRESSION Right 12/2006    right humeral fracture    SHOULDER ARTHROSCOPY Right ?when    JANA AND BSO      \"sometime in my 50's\"    THYROID LOBECTOMY Left 1995    TONSILLECTOMY  as a child     Family History   Problem Relation Age of Onset    Other Father         heart issues    Stroke Father     Heart Disease Brother     Other Brother         hx of heart cath with blockage    Cancer Brother 68        lung    Other Son         knee and multiple issues     Social History     Socioeconomic History    Marital status:       Spouse name: Not on file    Number of children: 2    Years of education: high [employer]    Highest education level: Not on file   Occupational History    Occupation: retired   Social Needs    Financial resource strain: Not on file    Food insecurity     Worry: Not on file     Inability: Not on file   [employer] needs     Medical: Not on file     Non-medical: Not on file   Tobacco Use    Smoking status: Never Smoker    Smokeless tobacco: Never Used   Substance and Sexual Activity    Alcohol use: No    Drug use: No    Sexual activity: Not on file   Lifestyle    Physical activity     Days per week: Not on file     Minutes per session: Not on file    Stress: Not on file   Relationships    Social connections     Talks on phone: Not on file     Gets together: Not on file     Attends Muslim service: Not on file     Active member of club or organization: Not on file     Attends meetings of clubs or organizations: Not on file     Relationship status: Not on file    Intimate partner violence     Fear of current or ex partner: Not on file     Emotionally abused: Not on file     Physically abused: Not on file     Forced sexual activity: Not on file Other Topics Concern    Not on file   Social History Narrative    Not on file     Current Facility-Administered Medications   Medication Dose Route Frequency Provider Last Rate Last Admin    0.9 % sodium chloride infusion   Intravenous Continuous Korinne Lusterio, PA-C 100 mL/hr at 03/18/21 1130 New Bag at 03/18/21 1130     Current Outpatient Medications   Medication Sig Dispense Refill    alendronate (FOSAMAX) 70 MG tablet Take 1 tablet by mouth every 7 days (Patient taking differently: Take 70 mg by mouth every 7 days Mondays) 12 tablet 1    levothyroxine (SYNTHROID) 100 MCG tablet Take 1 tablet by mouth daily 90 tablet 1    losartan-hydroCHLOROthiazide (HYZAAR) 50-12.5 MG per tablet Take 1 tablet by mouth daily 90 tablet 1    Cholecalciferol (VITAMIN D) 2000 UNITS CAPS capsule Take 1 capsule by mouth daily.  Handicap Placard MISC by Does not apply route Good for 5 years 1 each 0     Allergies   Allergen Reactions    Penicillins Rash       Nursing Notes Reviewed  PHYSICAL EXAM    VITAL SIGNS: BP (!) 113/56   Pulse 77   Temp 97.7 °F (36.5 °C) (Oral)   Resp 14   Ht 5' 3\" (1.6 m)   Wt 193 lb (87.5 kg)   SpO2 99%   BMI 34.19 kg/m²    Constitutional:  Well developed, Well nourished, In no acute distress. Appears globally fatigued. Head:  Normocephalic, Atraumatic  Eyes:  EOMI. Sclera clear. Conjunctiva normal, No discharge. Neck/Lymphatics: Supple, no JVD, No lymphadenopathy  Cardiovascular:  RRR, Normal S1 & S2    Peripheral Vascular: Distal pulses 2+, Capillary refill <2seconds  Respiratory:  Respirations nonnlabored, Clear to auscultation bilaterally, No retractions  Abdomen: Bowel sounds normal in all quadrants, Soft, Non tender/Nondistended, No palpable abdominal masses. Musculoskeletal: BUE/BLE symmetrical without atrophy or deformities. Calves are supple.   No pretibial pitting edema  Integument:  Warm, Dry, Intact, Skin turgor and texture normal  Neurologic:  Alert & oriented x3 , No focal deficits noted. Cranial nerves II through XII grossly intact. No slurred speech. No facial droop. Moving all extremities with equal strength and tone. Normal gross motor coordination & motor strength bilateral upper and lower extremities. Upper and Lower extremities DTRs intact. Sensation intact. No pronator drift.      Psychiatric:  Affect appropriate      I have reviewed and interpreted all of the currently available lab results from this visit (if applicable):  Results for orders placed or performed during the hospital encounter of 03/18/21   COVID-19, Rapid    Specimen: Nasopharyngeal   Result Value Ref Range    Source THROAT     SARS-CoV-2, NAAT NOT DETECTED    CBC auto diff   Result Value Ref Range    WBC 9.7 4.0 - 10.5 K/CU MM    RBC 4.39 4.2 - 5.4 M/CU MM    Hemoglobin 12.7 12.5 - 16.0 GM/DL    Hematocrit 39.0 37 - 47 %    MCV 88.8 78 - 100 FL    MCH 28.9 27 - 31 PG    MCHC 32.6 32.0 - 36.0 %    RDW 13.8 11.7 - 14.9 %    Platelets 597 749 - 609 K/CU MM    MPV 10.7 7.5 - 11.1 FL    Differential Type AUTOMATED DIFFERENTIAL     Segs Relative 72.7 (H) 36 - 66 %    Lymphocytes % 16.9 (L) 24 - 44 %    Monocytes % 7.8 (H) 0 - 4 %    Eosinophils % 1.5 0 - 3 %    Basophils % 0.6 0 - 1 %    Segs Absolute 7.0 K/CU MM    Lymphocytes Absolute 1.6 K/CU MM    Monocytes Absolute 0.8 K/CU MM    Eosinophils Absolute 0.2 K/CU MM    Basophils Absolute 0.1 K/CU MM    Nucleated RBC % 0.0 %    Total Nucleated RBC 0.0 K/CU MM    Total Immature Neutrophil 0.05 K/CU MM    Immature Neutrophil % 0.5 (H) 0 - 0.43 %   CMP   Result Value Ref Range    Sodium 137 135 - 145 MMOL/L    Potassium 3.4 (L) 3.5 - 5.1 MMOL/L    Chloride 98 (L) 99 - 110 mMol/L    CO2 27 21 - 32 MMOL/L    BUN 32 (H) 6 - 23 MG/DL    CREATININE 1.2 (H) 0.6 - 1.1 MG/DL    Glucose 107 (H) 70 - 99 MG/DL    Calcium 8.6 8.3 - 10.6 MG/DL    Albumin 3.0 (L) 3.4 - 5.0 GM/DL    Total Protein 5.5 (L) 6.4 - 8.2 GM/DL    Total Bilirubin 1.0 0.0 - 1.0 MG/DL    ALT 12 10 - 40 U/L    AST 18 15 - 37 IU/L    Alkaline Phosphatase 46 40 - 129 IU/L    GFR Non- 43 (L) >60 mL/min/1.73m2    GFR  52 (L) >60 mL/min/1.73m2    Anion Gap 12 4 - 16   Troponin   Result Value Ref Range    Troponin T 0.072 (H) <0.01 NG/ML   Urinalysis   Result Value Ref Range    Color, UA YELLOW YELLOW    Clarity, UA HAZY (A) CLEAR    Glucose, Urine NEGATIVE NEGATIVE MG/DL    Bilirubin Urine NEGATIVE NEGATIVE MG/DL    Ketones, Urine NEGATIVE NEGATIVE MG/DL    Specific Gravity, UA 1.020 1.001 - 1.035    Blood, Urine NEGATIVE NEGATIVE    pH, Urine 6.0 5.0 - 8.0    Protein, UA NEGATIVE NEGATIVE MG/DL    Urobilinogen, Urine NEGATIVE 0.2 - 1.0 MG/DL    Nitrite Urine, Quantitative NEGATIVE NEGATIVE    Leukocyte Esterase, Urine LARGE (A) NEGATIVE    RBC, UA 2 0 - 6 /HPF    WBC, UA 18 (H) 0 - 5 /HPF    Bacteria, UA NEGATIVE NEGATIVE /HPF    Squam Epithel, UA 6 /HPF    Trans Epithel, UA <1 /HPF    Mucus, UA RARE (A) NEGATIVE HPF    Trichomonas, UA NONE SEEN NONE SEEN /HPF    Hyaline Casts, UA 2 /LPF   Magnesium   Result Value Ref Range    Magnesium 2.2 1.8 - 2.4 mg/dl   TSH without Reflex   Result Value Ref Range    TSH, High Sensitivity 2.850 0.270 - 4.20 uIu/ml   T4, free   Result Value Ref Range    T4 Free 2.15 (H) 0.9 - 1.8 NG/DL   CK   Result Value Ref Range    Total CK 45 26 - 140 IU/L   Brain Natriuretic Peptide   Result Value Ref Range    Pro-BNP 12,096 (H) <300 PG/ML   POC Blood Glucose   Result Value Ref Range    Glucose 107 mg/dL    QC OK?  yes    EKG 12 Lead   Result Value Ref Range    Ventricular Rate 79 BPM    Atrial Rate 79 BPM    P-R Interval 312 ms    QRS Duration 132 ms    Q-T Interval 434 ms    QTc Calculation (Bazett) 497 ms    P Axis 83 degrees    R Axis -21 degrees    T Axis 7 degrees    Diagnosis       Sinus rhythm with 1st degree AV block with premature supraventricular complexes  Right bundle branch block  Abnormal ECG  No previous ECGs available attenuation is identified related to chronic microvascular ischemic change. Generalized age-appropriate cortical atrophy.  No intracranial mass is   identified. ORBITS: The visualized portion of the orbits demonstrate no acute abnormality. SINUSES: The visualized paranasal sinuses and mastoid air cells demonstrate   no acute abnormality. SOFT TISSUES/SKULL:  No acute abnormality of the visualized skull or soft   tissues.                     XR CHEST PORTABLE (Final result)  Result time 03/18/21 11:54:00  Final result by Jam Lynch MD (03/18/21 11:54:00)                Impression:    Mild left basilar atelectasis or infiltrate. Narrative:    EXAMINATION:   ONE XRAY VIEW OF THE CHEST     3/18/2021 11:07 am     COMPARISON:   March 17, 2018     HISTORY:   ORDERING SYSTEM PROVIDED HISTORY: chest pain   TECHNOLOGIST PROVIDED HISTORY:   Reason for exam:->chest pain   Reason for Exam: chest pain     FINDINGS:   Stable cardiomediastinal silhouette.  Questionable atelectasis or infiltrate   in the left lower lobe.  There is no pleural effusion or pneumothorax. Chronic interstitial changes are noted.  The osseous structures are stable. EKG Interpretation  Please see ED physician's note - Dr. Kg Dhillon - for EKG interpretation      Chart review shows recent radiographs:  No results found. ED COURSE & MEDICAL DECISION MAKING       Vital signs and nursing notes reviewed during ED course. I have independently evaluated this patient . Supervising physician - Dr. Kg Dhillon - was present in ED and available for consultation throughout entirety of patient's care. All pertinent Lab data and radiographic results reviewed with patient at bedside. The patient and/or the family were informed of the results of any tests/labs/imaging, the treatment plan, and time was allotted to answer questions. Clinical Impression:  1. General weakness    2.  BALBIR (acute kidney injury) ([facility] [address]    3. Elevated troponin    4. Hypokalemia    5. Elevated brain natriuretic peptide (BNP) level    6. Leukocytes in urine        Patient presents with generalized fatigue and weakness from fall 6 weeks ago. On exam, well-appearing pleasant 52-year-old female, sitting upright in bed, alert and oriented x3. Moving all extremities without focal weakness. Unremarkable cardiopulmonary exam.  Abdomen soft nontender. No asymmetry in the upper or lower extremity movements. Acting at her normal baseline mental status per sister-in-law at bedside. Patient was placed on telemetry continuous pulse ox monitoring, started on gentle IV fluid infusion. CBC with normal white count and hemoglobin. CMP with a mild hyponatremia 3.4. Also mild BALBIR with BUN/creatinine 32/1.2. Normal LFTs. Magnesium and CK within normal range. Thyroid panels with elevated free T4 but normal TSH. Troponin detectable at 0.072 with a BNP of 12,000 0 96, no previous labs available for comparison. UA shows large leukocytes with 18 white blood cells, negative for bacteria nitrates. Did send for urine culture, start IV Rocephin. EKG without acute ischemic changes. Chest x-ray shows mild left basilar atelectasis or infiltrate however patient denies any fever, cough and does not have a white count or other clinical signs for pneumonia infection. CT head without contrast reveals stable appearing CT scan without acute intracranial abnormality, age-appropriate cortical atrophy. Rapid Covid is negative. At this time, discussed with patient and family members at bedside admission given her generalized weakness, mild BALBIR as well as abnormal cardiac markers. She may be developing early CHF given the elevated BNP but does not appear clinically fluid overloaded on exam.  I think this should benefit from cardiac evaluation possible stress/echocardiogram continued IV hydration. Patient and family are comfortable and agreeable this plan.     I did

## 2021-03-18 NOTE — ED TRIAGE NOTES
Family concerned due to patient's decline in health over the last several months, she did have a significant fall 5-6 weeks ago resulting in rib pain. Presenting today with generalized fatigue and family concerns. She states she lives with her brother and is able to perform her own ADL's.

## 2021-03-18 NOTE — CONSULTS
Chart reviewed  Pt examined  Full note to follow  Admitted with fatigue has elev trop not positive  Echo today                      Name:  Goldy Corbin /Age/Sex: 1940  (78 y.o. female)   MRN & CSN:  [id number] & [id number] Admission Date/Time: 3/18/2021 10:53 AM   Location:  [address] [place] [address] PCP: Kerrie Palm, [address] [place] [address] Day: 2          Referring physician:  Jonn Guevara MD         Reason for consultation:  elevated        Thanks for referral.    Information source: patient    CC;  fatigue      HPI:   Thank you for involving me in taking  care of Goldy Corbin who  is a 78 y. o.year  Old female  Presents with  H/o htn, dm now with feeling fatigued , feels numbness in arms and legs, has no CP, per pt has fallen in recent past as well, feels weak,  trop is mildly elevated and EKG has nonsp ST changes. Past medical history:    has a past medical history of Family history of abdominal aortic aneurysm, Family history of colon cancer, Fracture of left humerus, Hypertension, Hypothyroidism, postsurgical, Lumbar Spinal Stenosis, Multinodular goiter, VILLANUEVA (nonalcoholic steatohepatitis), Necrobiosis lipoidica diabeticorum ([address] Obesity, Class II, BMI 35-39.9, with comorbidity, Osteoporosis, Polymyalgia rheumatica ([address] RBBB, Type II Diabetes Mellitus, and Urolithiasis. Past surgical history:   has a past surgical history that includes mane and bso (cervix removed); Cholecystectomy, laparoscopic (); Thyroid lobectomy (Left, ); orif humerus decompression (Right, 2006); Shoulder arthroscopy (Right, ?when); Anus surgery (?when); Knee arthroscopy (Right, ); Elbow fracture surgery (Left, 2011); Cataract removal with implant (Bilateral, 10+ yrs ago); Colonoscopy (last one 5+ ys ago ); Tonsillectomy (as a child); and Carpal tunnel release (Right, 3/18/2019). Social History:   reports that she has never smoked.  She has never used smokeless tobacco. She reports that she does not drink alcohol or use drugs. Family history:  family history includes Cancer (age of onset: 68) in her brother; Heart Disease in her brother; Other in her brother, father, and son; Stroke in her father.     Allergies   Allergen Reactions    Penicillins Rash       0.9 % sodium chloride infusion, Continuous  cefTRIAXone (ROCEPHIN) 1000 mg IVPB in 50 mL D5W minibag, Q24H  levothyroxine (SYNTHROID) tablet 100 mcg, Daily  vitamin D CAPS 2,000 Units, Daily  sodium chloride flush 0.9 % injection 10 mL, 2 times per day  sodium chloride flush 0.9 % injection 10 mL, PRN  enoxaparin (LOVENOX) injection 40 mg, QPM  promethazine (PHENERGAN) tablet 12.5 mg, Q6H PRN    Or  ondansetron (ZOFRAN) injection 4 mg, Q6H PRN  acetaminophen (TYLENOL) tablet 650 mg, Q6H PRN    Or  acetaminophen (TYLENOL) suppository 650 mg, Q6H PRN  polyethylene glycol (GLYCOLAX) packet 17 g, Daily PRN      Current Facility-Administered Medications   Medication Dose Route Frequency Provider Last Rate Last Admin    0.9 % sodium chloride infusion   Intravenous Continuous Jaycee Cords, APRN -  mL/hr at 03/19/21 0144 New Bag at 03/19/21 0144    cefTRIAXone (ROCEPHIN) 1000 mg IVPB in 50 mL D5W minibag  1,000 mg Intravenous Q24H Jaycee Cords, APRN - CNP        levothyroxine (SYNTHROID) tablet 100 mcg  100 mcg Oral Daily Jaycee Cords, APRN - CNP   100 mcg at 03/18/21 1712    vitamin D CAPS 2,000 Units  2 capsule Oral Daily Jaycee Cords, APRN - CNP   2,000 Units at 03/18/21 1712    sodium chloride flush 0.9 % injection 10 mL  10 mL Intravenous 2 times per day Jaycee Cords, APRN - CNP        sodium chloride flush 0.9 % injection 10 mL  10 mL Intravenous PRN Jaycee Cords, APRN - CNP        enoxaparin (LOVENOX) injection 40 mg  40 mg Subcutaneous QPM Jaycee Cords, APRN - CNP   40 mg at 03/18/21 1712    promethazine (PHENERGAN) tablet 12.5 mg  12.5 mg Oral Q6H PRN Jaycee Cords, APRN - CNP        Or    ondansetron (ZOFRAN) injection 4 mg  4 mg Intravenous Q6H PRN NICOLA Randall CNP        acetaminophen (TYLENOL) tablet 650 mg  650 mg Oral Q6H PRN NICOLA Randall CNP        Or    acetaminophen (TYLENOL) suppository 650 mg  650 mg Rectal Q6H PRN NICOLA Randall CNP        polyethylene glycol (GLYCOLAX) packet 17 g  17 g Oral Daily PRN NICOLA Randall CNP         Review of Systems:  All 14 systems reviewed, all negative except for  weakness    Physical Examination:    BP (!) 109/57   Pulse 85   Temp 98.2 °F (36.8 °C) (Oral)   Resp 18   Ht 5' 3\" (1.6 m)   Wt 179 lb 12.8 oz (81.6 kg)   SpO2 99%   BMI 31.85 kg/m²      Wt Readings from Last 3 Encounters:   03/18/21 179 lb 12.8 oz (81.6 kg)   02/18/21 193 lb 9.6 oz (87.8 kg)   11/19/20 203 lb (92.1 kg)     Body mass index is 31.85 kg/m². General Appearance:  fair  Head: normocephalic     Eyes: normal, noninjected conjunctiva    ENT: normal mucosa, noninjected throat, normal     NECK: No JVP  No thyromegaly        Cardiovascular: No thrills palpated   Auscultation: Normal S1 and S2,  no murmur   carotid bruit no   Abdominal Aorta no bruit    Respiratory:    Breath sounds Clear = 0    Extremities:  none Edema clubbing ,   no cyanosis    SKIN: Warm and well perfused, no pallor or cyanosis    Vascular exam:  Pedal Pulses: palp  bilaterally        Abdomen:  No masses or tenderness. No organomegaly noted. Neurological:  Oriented to time, place, and person   No focal neurological deficit noted. Psychiatric:normal mood, no anxiety    Lab Review   Recent Labs     03/18/21  1120   WBC 9.7   HGB 12.7   HCT 39.0         Recent Labs     03/19/21  0011      K 3.0*      CO2 24   BUN 27*   CREATININE 1.2*     Recent Labs     03/18/21  1120   AST 18   ALT 12   BILITOT 1.0   ALKPHOS 46     No results for input(s): TROPONINI in the last 72 hours.   No results found for: BNP  No results found for: INR, PROTIME        Assessment/Recommendations:     - elev trop: not pos , check echo  - Hypokalemia will correct  - HTN stable  - DM stable  - will probably need CAD young based on her course         Alix Vidales MD, 3/19/2021 9:38 AM

## 2021-03-18 NOTE — ED NOTES
Bed: ED-16  Expected date:   Expected time:   Means of arrival:   Comments:  EMS     Jasmin Ray RN  03/18/21 1055

## 2021-03-18 NOTE — ED NOTES
Report called to [facility] RN for room 4009.      Marvin Jimenez RN  03/18/21 5772

## 2021-03-18 NOTE — ED PROVIDER NOTES
This EKG was interpreted by me. Rate is 79, rhythm is sinus. IN is 312 and QT intervals are within normal limits. There is no ST segment or T wave changes. This EKG was compared to previous EKG from date 2/1/17. Right bundle branch block.      Enrique Burch DO  03/18/21 1510

## 2021-03-18 NOTE — H&P
History and Physical      Name:  Emilee BAIRD/Age/Sex: 1940  (78 y.o. female)   MRN & CSN:  [id number] & [id number] Admission Date/Time: 3/18/2021 10:53 AM   Location:  ED16/ED-16 PCP: Jovan Spence MD       Admitting Physicians : Dr. Odilia Weinstein is a 78 y.o.  female  who presents with Fatigue (decline in health over recent months)    Assessment and Plan:   Fatigue likely 2/2 dehydration/deconditioning/ACS  # Fall at home without fracture  -IVF  -PT/OT   -Check Vitamin D and TSH    Acute  Cystitis without hematuria  -Rocephin IV  -Urine cx    Detectable troponin with abnormal EKG  -Serial troponin  -Repeat EKG in am  -Cardiology consult    Hypothyroidism  -Check TSH  -Continue levothyroxine      Obesity class I with BMI of 34.1  -Educated about lifestyle changes    HTN, normotensive  -Held due to mild BALBIR   -Monitor BP and resume at discharge    DMII with chronic complications  -Diet controlled  -Check A1c      DVT-PPX: Lovenox  Diet: Cardiac      Medications:   Medications:    Infusions:    sodium chloride 100 mL/hr at 21 1130     PRN Meds:      Current Facility-Administered Medications:     0.9 % sodium chloride infusion, , Intravenous, Continuous, Korinne Lusterio, PA-C, Last Rate: 100 mL/hr at 21 1130, New Bag at 21 1130    Current Outpatient Medications:     alendronate (FOSAMAX) 70 MG tablet, Take 1 tablet by mouth every 7 days (Patient taking differently: Take 70 mg by mouth every 7 days ), Disp: 12 tablet, Rfl: 1    levothyroxine (SYNTHROID) 100 MCG tablet, Take 1 tablet by mouth daily, Disp: 90 tablet, Rfl: 1    losartan-hydroCHLOROthiazide (HYZAAR) 50-12.5 MG per tablet, Take 1 tablet by mouth daily, Disp: 90 tablet, Rfl: 1    Cholecalciferol (VITAMIN D) 2000 UNITS CAPS capsule, Take 1 capsule by mouth daily. , Disp: , Rfl:     Handicap Placard MISC, by Does not apply route Good for 5 years, Disp: 1 each, Rfl: 0    History of present illness Chief Complaint: Fatigue (decline in health over recent months)      Carlo Haines is a 78 y.o.  female  who presents with worsening fatigue that has been going on for the past month. Patient saw her PCP and was scheduled to have vitamin D TSH and CMP today but was unable to complete as she had to come to ED. She does report associated frequency and bilateral upper and lower extremity tingling and numbness. She was diagnosed with type 2 diabetes couple years ago, but was taken off of medication after she lost weight. Patient states she also fell a month ago and broken her ribs and ever since she has not been doing well. Denies fever, chills, chest pain, shortness of breath, cough, abdominal pain, N/V/D. Hx of hypothyroidism, HTN, HLD, and obesity. Review of Systems   Ten point ROS reviewed and negative, unless as noted below. GENERAL:  Denies fever, chills, night sweats, or changes in weight. + Fatigue  EYES:  Denies recent visual changes. ENT:  Denies ear pain, hearing loss or tinnitus  RESP:  Denies any cough, dyspnea, or wheezing. CV:  Denies any chest pain with exertion or at rest, palpitations, syncope, or edema. GI:  Denies any dysphagia, nausea, vomiting, abdominal pain, heartburn, changes in bowel habit, melena or rectal bleeding  : + Frequency  MUSCULOSKELETAL:  Denies any joint swelling, joint pain, or loss of range of motion. NEURO:  Denies any headaches, tremors, dizziness, vertigo, memory loss, confusion, weakness, bilateral upper and lower extremities numbness and tingling. PSYCH:  Denies any sleeping problems, history of abuse, marital discord. HEME/LYMPHATIC/IMMUNO:  Denies , bruising, bleeding abnormalities   ENDO:  Denies any heat or cold intolerance, panemiaolyuria or polydipsia.       Objective:   No intake or output data in the 24 hours ending 03/18/21 1527   Vitals:   Vitals:    03/18/21 1514   BP:    Pulse: 77   Resp: 14   Temp:    SpO2: 99%     Physical Exam:   Gen: awake, alert, cooperative, no apparent distress. Obese. Ill appearing  EYES:Lids and lashes normal, pupils equal, round ,extra ocular muscles intact, sclera clear, conjunctiva normal  ENT:  Normocephalic, oral pharynx with moist mucus membranes  NECK:  Supple, symmetrical, trachea midline, no adenopathy,  LUNGS:  Clear to auscultate bilaterally, no rales ronchi or wheezing noted. CARDIOVASCULAR:  regular rate and rhythm, normal S1 and S2,no murmur noted, peripheral pulses 2+, no pitting edema  ABDOMEN: Normal BS, Non tender, non distended, no HSM noted. No rebound or guarding. MUSCULOSKELETAL:  ROM of all extremities grossly wnl  NEUROLOGIC: AOx 3,  Cranial nerves II-XII are grossly intact. Motor is 5 out of 5 bilaterally. Sensory is intact, no lateralizing findings. SKIN:  no bruising or bleeding, normal skin color, turgor, no redness, warmth, or swelling      Past Medical History:      Past Medical History:   Diagnosis Date    Family history of abdominal aortic aneurysm     CT Abd Aorta 10/2007 - normal aorta    Family history of colon cancer     Colonoscopy due 1/2013    Fracture of left humerus 3/2014    [place]    Hypertension     Hypothyroidism, postsurgical 1997    Left Thyroid Lobectomy    Lumbar Spinal Stenosis 2006    severe    Multinodular goiter 1996    suppresion and bx negative; s/p thyroid lobectomy    VILLANUEVA (nonalcoholic steatohepatitis) 2005    villanueva vs cirrhosis;  Dr. 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[facility] [address]     Obesity, Class II, BMI 35-39.9, with comorbidity     Osteoporosis 1/2015    FRAX 5.5 & 17%    Polymyalgia rheumatica ([facility] [address] 1/2012    Dr Felisa Simmons R; released her 3/2014    RBBB 02/2017    new finding    Type II Diabetes Mellitus 1994    per pt on 3/14/2019\"they do not consider me diabetic anymore- took me off medication long time ago\"    Urolithiasis     once     PSHX:  has a past surgical history that includes mane and bso (cervix removed);  Cholecystectomy, CNP on 3/18/2021 at 3:27 PM

## 2021-03-19 ENCOUNTER — APPOINTMENT (OUTPATIENT)
Dept: NUCLEAR MEDICINE | Age: 81
End: 2021-03-19
Payer: COMMERCIAL

## 2021-03-19 LAB
ANION GAP SERPL CALCULATED.3IONS-SCNC: 10 MMOL/L (ref 4–16)
ANION GAP SERPL CALCULATED.3IONS-SCNC: 8 MMOL/L (ref 4–16)
BASOPHILS ABSOLUTE: 0.1 K/CU MM
BASOPHILS RELATIVE PERCENT: 0.8 % (ref 0–1)
BUN BLDV-MCNC: 24 MG/DL (ref 6–23)
BUN BLDV-MCNC: 27 MG/DL (ref 6–23)
CALCIUM SERPL-MCNC: 7.6 MG/DL (ref 8.3–10.6)
CALCIUM SERPL-MCNC: 8.1 MG/DL (ref 8.3–10.6)
CHLORIDE BLD-SCNC: 100 MMOL/L (ref 99–110)
CHLORIDE BLD-SCNC: 102 MMOL/L (ref 99–110)
CO2: 24 MMOL/L (ref 21–32)
CO2: 25 MMOL/L (ref 21–32)
CREAT SERPL-MCNC: 1.1 MG/DL (ref 0.6–1.1)
CREAT SERPL-MCNC: 1.2 MG/DL (ref 0.6–1.1)
CULTURE: NORMAL
DIFFERENTIAL TYPE: ABNORMAL
EKG ATRIAL RATE: 79 BPM
EKG ATRIAL RATE: 84 BPM
EKG DIAGNOSIS: NORMAL
EKG DIAGNOSIS: NORMAL
EKG P AXIS: 39 DEGREES
EKG P AXIS: 83 DEGREES
EKG P-R INTERVAL: 198 MS
EKG P-R INTERVAL: 312 MS
EKG Q-T INTERVAL: 434 MS
EKG Q-T INTERVAL: 466 MS
EKG QRS DURATION: 132 MS
EKG QRS DURATION: 140 MS
EKG QTC CALCULATION (BAZETT): 497 MS
EKG QTC CALCULATION (BAZETT): 550 MS
EKG R AXIS: -21 DEGREES
EKG R AXIS: -50 DEGREES
EKG T AXIS: 7 DEGREES
EKG T AXIS: 8 DEGREES
EKG VENTRICULAR RATE: 79 BPM
EKG VENTRICULAR RATE: 84 BPM
EOSINOPHILS ABSOLUTE: 0.1 K/CU MM
EOSINOPHILS RELATIVE PERCENT: 1.5 % (ref 0–3)
GFR AFRICAN AMERICAN: 52 ML/MIN/1.73M2
GFR AFRICAN AMERICAN: 58 ML/MIN/1.73M2
GFR NON-AFRICAN AMERICAN: 43 ML/MIN/1.73M2
GFR NON-AFRICAN AMERICAN: 48 ML/MIN/1.73M2
GLUCOSE BLD-MCNC: 92 MG/DL (ref 70–99)
GLUCOSE BLD-MCNC: 96 MG/DL (ref 70–99)
HCT VFR BLD CALC: 41.2 % (ref 37–47)
HEMOGLOBIN: 12.8 GM/DL (ref 12.5–16)
IMMATURE NEUTROPHIL %: 0.4 % (ref 0–0.43)
LV EF: 35 %
LVEF MODALITY: NORMAL
LYMPHOCYTES ABSOLUTE: 1.8 K/CU MM
LYMPHOCYTES RELATIVE PERCENT: 18.6 % (ref 24–44)
Lab: NORMAL
MAGNESIUM: 2.1 MG/DL (ref 1.8–2.4)
MCH RBC QN AUTO: 27.9 PG (ref 27–31)
MCHC RBC AUTO-ENTMCNC: 31.1 % (ref 32–36)
MCV RBC AUTO: 89.8 FL (ref 78–100)
MONOCYTES ABSOLUTE: 0.7 K/CU MM
MONOCYTES RELATIVE PERCENT: 7.2 % (ref 0–4)
NUCLEATED RBC %: 0 %
PDW BLD-RTO: 13.8 % (ref 11.7–14.9)
PLATELET # BLD: 192 K/CU MM (ref 140–440)
PMV BLD AUTO: 10.5 FL (ref 7.5–11.1)
POTASSIUM SERPL-SCNC: 3 MMOL/L (ref 3.5–5.1)
POTASSIUM SERPL-SCNC: 3.6 MMOL/L (ref 3.5–5.1)
PROCALCITONIN: 0.12
RBC # BLD: 4.59 M/CU MM (ref 4.2–5.4)
SEGMENTED NEUTROPHILS ABSOLUTE COUNT: 6.8 K/CU MM
SEGMENTED NEUTROPHILS RELATIVE PERCENT: 71.5 % (ref 36–66)
SODIUM BLD-SCNC: 133 MMOL/L (ref 135–145)
SODIUM BLD-SCNC: 136 MMOL/L (ref 135–145)
SPECIMEN: NORMAL
TOTAL IMMATURE NEUTOROPHIL: 0.04 K/CU MM
TOTAL NUCLEATED RBC: 0 K/CU MM
TROPONIN T: 0.07 NG/ML
WBC # BLD: 9.5 K/CU MM (ref 4–10.5)

## 2021-03-19 PROCEDURE — 80048 BASIC METABOLIC PNL TOTAL CA: CPT

## 2021-03-19 PROCEDURE — 6370000000 HC RX 637 (ALT 250 FOR IP): Performed by: PHYSICIAN ASSISTANT

## 2021-03-19 PROCEDURE — A9500 TC99M SESTAMIBI: HCPCS | Performed by: INTERNAL MEDICINE

## 2021-03-19 PROCEDURE — 97530 THERAPEUTIC ACTIVITIES: CPT

## 2021-03-19 PROCEDURE — 93017 CV STRESS TEST TRACING ONLY: CPT

## 2021-03-19 PROCEDURE — 6360000002 HC RX W HCPCS: Performed by: NURSE PRACTITIONER

## 2021-03-19 PROCEDURE — 85025 COMPLETE CBC W/AUTO DIFF WBC: CPT

## 2021-03-19 PROCEDURE — 96372 THER/PROPH/DIAG INJ SC/IM: CPT

## 2021-03-19 PROCEDURE — 83735 ASSAY OF MAGNESIUM: CPT

## 2021-03-19 PROCEDURE — 97163 PT EVAL HIGH COMPLEX 45 MIN: CPT

## 2021-03-19 PROCEDURE — 2580000003 HC RX 258: Performed by: NURSE PRACTITIONER

## 2021-03-19 PROCEDURE — 3430000000 HC RX DIAGNOSTIC RADIOPHARMACEUTICAL: Performed by: INTERNAL MEDICINE

## 2021-03-19 PROCEDURE — 93005 ELECTROCARDIOGRAM TRACING: CPT | Performed by: NURSE PRACTITIONER

## 2021-03-19 PROCEDURE — 93010 ELECTROCARDIOGRAM REPORT: CPT | Performed by: INTERNAL MEDICINE

## 2021-03-19 PROCEDURE — 96367 TX/PROPH/DG ADDL SEQ IV INF: CPT

## 2021-03-19 PROCEDURE — 78452 HT MUSCLE IMAGE SPECT MULT: CPT

## 2021-03-19 PROCEDURE — 97535 SELF CARE MNGMENT TRAINING: CPT

## 2021-03-19 PROCEDURE — 84484 ASSAY OF TROPONIN QUANT: CPT

## 2021-03-19 PROCEDURE — 6360000002 HC RX W HCPCS: Performed by: INTERNAL MEDICINE

## 2021-03-19 PROCEDURE — 36415 COLL VENOUS BLD VENIPUNCTURE: CPT

## 2021-03-19 PROCEDURE — 2580000003 HC RX 258: Performed by: INTERNAL MEDICINE

## 2021-03-19 PROCEDURE — 97166 OT EVAL MOD COMPLEX 45 MIN: CPT

## 2021-03-19 PROCEDURE — 96366 THER/PROPH/DIAG IV INF ADDON: CPT

## 2021-03-19 PROCEDURE — G0378 HOSPITAL OBSERVATION PER HR: HCPCS

## 2021-03-19 PROCEDURE — 97116 GAIT TRAINING THERAPY: CPT

## 2021-03-19 PROCEDURE — 99204 OFFICE O/P NEW MOD 45 MIN: CPT | Performed by: INTERNAL MEDICINE

## 2021-03-19 PROCEDURE — 84145 PROCALCITONIN (PCT): CPT

## 2021-03-19 RX ORDER — AMINOPHYLLINE DIHYDRATE 25 MG/ML
75 INJECTION, SOLUTION INTRAVENOUS ONCE
Status: COMPLETED | OUTPATIENT
Start: 2021-03-19 | End: 2021-03-19

## 2021-03-19 RX ORDER — POTASSIUM CHLORIDE 20 MEQ/1
40 TABLET, EXTENDED RELEASE ORAL ONCE
Status: COMPLETED | OUTPATIENT
Start: 2021-03-19 | End: 2021-03-19

## 2021-03-19 RX ADMIN — AMINOPHYLLINE 75 MG: 25 INJECTION, SOLUTION INTRAVENOUS at 12:49

## 2021-03-19 RX ADMIN — Medication 30 MILLICURIE: at 13:59

## 2021-03-19 RX ADMIN — SODIUM CHLORIDE: 9 INJECTION, SOLUTION INTRAVENOUS at 18:05

## 2021-03-19 RX ADMIN — CEFTRIAXONE 1000 MG: 1 INJECTION, POWDER, FOR SOLUTION INTRAMUSCULAR; INTRAVENOUS at 14:42

## 2021-03-19 RX ADMIN — SODIUM CHLORIDE: 9 INJECTION, SOLUTION INTRAVENOUS at 01:44

## 2021-03-19 RX ADMIN — SODIUM CHLORIDE, PRESERVATIVE FREE 10 ML: 5 INJECTION INTRAVENOUS at 09:48

## 2021-03-19 RX ADMIN — POTASSIUM CHLORIDE 40 MEQ: 1500 TABLET, EXTENDED RELEASE ORAL at 01:30

## 2021-03-19 RX ADMIN — ENOXAPARIN SODIUM 40 MG: 40 INJECTION SUBCUTANEOUS at 18:13

## 2021-03-19 RX ADMIN — AZITHROMYCIN MONOHYDRATE 500 MG: 500 INJECTION, POWDER, LYOPHILIZED, FOR SOLUTION INTRAVENOUS at 18:06

## 2021-03-19 RX ADMIN — SODIUM CHLORIDE, PRESERVATIVE FREE 10 ML: 5 INJECTION INTRAVENOUS at 21:15

## 2021-03-19 RX ADMIN — REGADENOSON 0.4 MG: 0.08 INJECTION, SOLUTION INTRAVENOUS at 12:42

## 2021-03-19 RX ADMIN — Medication 10 MILLICURIE: at 14:00

## 2021-03-19 NOTE — PROGRESS NOTES
Physical Therapy    Facility/Department: [facility] 4E  Initial Assessment    NAME: Emilee Rivera  : 1940  MRN: [id number]    Date of Service: 3/19/2021    Discharge Recommendations:  IP Rehab        Assessment   Assessment: Pt is an 78 y.o. female with medical history, surgical history, co-morbidities, and personal factors including Family history of abdominal aortic aneurysm, Family history of colon cancer, Fracture of left humerus, Hypertension, Hypothyroidism, postsurgical, Lumbar Spinal Stenosis, Multinodular goiter, VILLANUEVA (nonalcoholic steatohepatitis), Necrobiosis lipoidica diabeticorum ([facility] [address] Obesity, Class II, BMI 35-39.9, with comorbidity, Osteoporosis, Polymyalgia rheumatica ([facility] [address] RBBB, Type II Diabetes Mellitus, Urolithiasis, mane and bso (cervix removed); Cholecystectomy, laparoscopic (); Thyroid lobectomy (Left, ); orif humerus decompression (Right, 2006); Shoulder arthroscopy (Right, ?when); Anus surgery (?when); Knee arthroscopy (Right, ); Elbow fracture surgery (Left, 2011); Cataract removal with implant (Bilateral, 10+ yrs ago); Colonoscopy (last one 5+ ys ago ); Tonsillectomy (as a child); and Carpal tunnel release (Right, 3/18/2019) with admission for General weakness, BALBIR (acute kidney injury), Elevated troponin, Hypokalemia, Elevated brain natriuretic peptide (BNP) level, and Leukocytes in urine. Prior to admission, pt was independent with functional mobility and ADLs. Examination of body systems reveals decreased strength, decreased balance, decreased aerobic capacity, and decreased independence with functional mobility. Prognosis: Good  Decision Making: High Complexity  Clinical Presentation: unpredictable characteristics  PT Education: Goals;PT Role;Plan of Care;Gait Training;Equipment; Functional Mobility Training;Transfer Training;General Safety  REQUIRES PT FOLLOW UP: Yes  Activity Tolerance  Activity Tolerance: Patient Tolerated treatment well Restrictions  Restrictions/Precautions  Restrictions/Precautions: General Precautions, Fall Risk  Vision/Hearing  Vision: Within Functional Limits  Hearing: Within functional limits     Subjective  General  Chart Reviewed: Yes  Patient assessed for rehabilitation services?: Yes  Family / Caregiver Present: Yes  Follows Commands: Within Functional Limits  Pain Screening  Patient Currently in Pain: Denies  Pain Assessment  Pain Assessment: 0-10  Pain Level: 0  Vital Signs  Patient Currently in Pain: Denies       Orientation  Orientation  Overall Orientation Status: Within Functional Limits  Social/Functional History  Social/Functional History  Lives With: Family(Brother)  Type of Home: House  Home Layout: One level  Home Access: Stairs to enter without rails  Entrance Stairs - Number of Steps: 2 (deep enough for walker)  Bathroom Shower/Tub: Walk-in shower  Bathroom Toilet: Standard  Bathroom Equipment: Shower chair  Bathroom Accessibility: Accessible  Home Equipment: Rolling walker, Cane  ADL Assistance: Independent  Homemaking Assistance: Independent  Homemaking Responsibilities: Yes  Ambulation Assistance: Independent(typically uses no AD)  Transfer Assistance: Independent  Active : No  Occupation: Retired  Leisure & Hobbies: Watching deer, TV  Cognition   Cognition  Overall Cognitive Status: Exceptions  Arousal/Alertness: Appropriate responses to stimuli  Following Commands:  Follows all commands without difficulty  Attention Span: Appears intact  Safety Judgement: Decreased awareness of need for safety;Decreased awareness of need for assistance  Problem Solving: Decreased awareness of errors  Insights: Decreased awareness of deficits  Initiation: Requires cues for some  Sequencing: Requires cues for some    Objective             Strength RLE  Comment: knee extension: at least 3+/5 observed functionally  Strength LLE  Comment: knee extension: at least 3+/5 observed functionally     Sensation  Overall Sensation Status: WNL     Transfers  Sit to Stand: Contact guard assistance(with verbal cues to push through chair and avoid pulling on walker)  Stand to sit: Minimal Assistance(with verbal cues to feel chair against back of legs, reach back, and sit slowly)  Ambulation  Ambulation?: Yes  Ambulation 1  Surface: level tile  Device: Rolling Walker  Assistance: Contact guard assistance;Minimal assistance  Quality of Gait: decreased gait speed, decreased step length bilaterally, unsteady gait  Distance: 35 feet + 35 feet  Comments: with verbal and tactile cues for BLE placement, walker placement, and sequence throughout ambulation; with verbal and tactile cues to maintain upright posture in order to avoid COM shifting outside of MARCOS; with verbal cues for directions in order to successfully navigate hallway and return to correct room     Balance  Posture: Fair(forward head, rounded shoulders)  Sitting - Static: Good  Sitting - Dynamic: Good  Standing - Static: Fair;-  Standing - Dynamic: Poor;+      Gait Training:  Cues were given for safety, sequence, device management, balance, posture, awareness, path. Therapeutic Activity Training:   Therapeutic activity training was instructed today. Cues were given for safety, sequence, UE/LE placement, awareness, and balance. Activities performed today included bed mobility training, sup-sit, sit-stand. Plan   Plan  Times per week: 3+  Current Treatment Recommendations: Strengthening, Balance Training, Functional Mobility Training, Transfer Training, ADL/Self-care Training, Gait Training, Patient/Caregiver Education & Training, Stair training, ROM, IADL Training, Equipment Evaluation, Education, & procurement, Pain Management, Neuromuscular Re-education, Home Exercise Program, Positioning, Endurance Training, Safety Education & Training, Cognitive/Perceptual Training  Safety Devices  Type of devices:  All fall risk precautions in place, Left in chair, Call light within reach, Chair alarm in place, Nurse notified, Gait belt, Patient at risk for falls      AM-PAC Score  AM-PAC Inpatient Mobility Raw Score : 16 (03/19/21 1529)  AM-PAC Inpatient T-Scale Score : 40.78 (03/19/21 1529)  Mobility Inpatient CMS 0-100% Score: 54.16 (03/19/21 1529)  Mobility Inpatient CMS G-Code Modifier : CK (03/19/21 1529)          Goals  Long term goals  Time Frame for Long term goals :  In one week:  Long term goal 1: Pt will complete all bed mobility with supervision  Long term goal 2: Pt will complete sit <> stand transfers with supervision  Long term goal 3: Pt will ambulate 200 feet with SBAx1 with LRAD  Long term goal 4: Pt will independently complete 3 sets of 10 reps of BLE AROM exercises in available and allowed ROM  Long term goal 5: Pt will ascend/descend 6 steps with a handrail with minAx1       Time In: 1015  Time Out: 1055  Total Treatment Time: 40  Timed Code Treatment Minutes: Mali Pickett, PT, DPT  License #: [id number]

## 2021-03-19 NOTE — PROGRESS NOTES
Donita Vick MD, [address]                Internal Medicine Hospitalist             Daily Progress  Note   Subjective:     Chief Complaint   Patient presents with    Fatigue     decline in health over recent months     Ms. Bhandari Complains of nil, still very weak. Objective:    BP (!) 103/56   Pulse 85   Temp 97.8 °F (36.6 °C) (Oral)   Resp 18   Ht 5' 3\" (1.6 m)   Wt 179 lb 12.8 oz (81.6 kg)   SpO2 98%   BMI 31.85 kg/m²      Intake/Output Summary (Last 24 hours) at 3/19/2021 1303  Last data filed at 3/19/2021 0948  Gross per 24 hour   Intake 2223.33 ml   Output 500 ml   Net 1723.33 ml      Physical Exam:  Heart:  Regular rate and rhythm, normal S1 and S2 in all 4 auscultatory areas. No rubs  Short systolic Murmur 2/6 no gallops heard. Lungs: Mostly clear to auscultation, decreased breath sounds at bases. No wheezes appreciated no crackles heard. Abdomen: Soft, non distended. Bowel sounds appreciated. No obvious liver or spleen enlargement. Non tender, no rebound noted. Extremities: Non tender, no swelling noted, strength 5/5 both legs. CNS: Grossly intact.  Can move all 4    Labs:  CBC with Differential:    Lab Results   Component Value Date    WBC 9.5 03/19/2021    RBC 4.59 03/19/2021    HGB 12.8 03/19/2021    HCT 41.2 03/19/2021     03/19/2021    MCV 89.8 03/19/2021    MCH 27.9 03/19/2021    MCHC 31.1 03/19/2021    RDW 13.8 03/19/2021    SEGSPCT 71.5 03/19/2021    LYMPHOPCT 18.6 03/19/2021    MONOPCT 7.2 03/19/2021    EOSPCT 2.3 11/16/2020    BASOPCT 0.8 03/19/2021    MONOSABS 0.7 03/19/2021    LYMPHSABS 1.8 03/19/2021    EOSABS 0.1 03/19/2021    BASOSABS 0.1 03/19/2021    DIFFTYPE AUTOMATED DIFFERENTIAL 03/19/2021     CMP:    Lab Results   Component Value Date     03/19/2021    K 3.6 03/19/2021     03/19/2021    CO2 25 03/19/2021    BUN 24 03/19/2021    CREATININE 1.1 03/19/2021    GFRAA 58 03/19/2021    GFRAA >60 12/13/2012    AGRATIO 2.3 06/17/2019    LABGLOM 48 03/19/2021 LABGLOM 42 09/23/2019    GLUCOSE 96 03/19/2021    GLUCOSE 129 05/11/2012    PROT 5.5 03/18/2021    PROT 6.4 03/18/2013    LABALBU 3.0 03/18/2021    CALCIUM 8.1 03/19/2021    BILITOT 1.0 03/18/2021    ALKPHOS 46 03/18/2021    AST 18 03/18/2021    ALT 12 03/18/2021     Recent Labs     03/18/21  1130 03/18/21  1703 03/19/21  0011   TROPONINT 0.072* 0.064* 0.074*     Lab Results   Component Value Date    TSHHS 2.850 03/18/2021         sodium chloride 100 mL/hr at 03/19/21 0144      cefTRIAXone (ROCEPHIN) IV  1,000 mg Intravenous Q24H    levothyroxine  100 mcg Oral Daily    vitamin D  2 capsule Oral Daily    sodium chloride flush  10 mL Intravenous 2 times per day    enoxaparin  40 mg Subcutaneous QPM         Assessment:       Patient Active Problem List    Diagnosis Date Noted    General weakness     Acute cystitis without hematuria 03/18/2021    Other hyperlipidemia 02/18/2021    Morbidly obese ([facility] [address]     Hypothyroidism, postsurgical     Urolithiasis     Osteoporosis 01/01/2015    Proximal humerus fracture 03/04/2014    Family history of abdominal aortic aneurysm     Lumbar Spinal Stenosis     Multinodular goiter     Necrobiosis lipoidica diabeticorum (H[facility])     Type II Diabetes Mellitus     VILLANUEVA (nonalcoholic steatohepatitis)     Hypertension        Plan:     Problems being addressed this admission:   Failure to thrive adult / fall / LLL PNA  ? UTI  Elevated BNP / troponins  Elevated FT4    Will need to continue with her antibiotics as that could have caused her to be weak. Will also need PT/OT/CM. Check procalcitonin. Check urine c/s and also on Rocephin from before. She just had a stress test done await results. Wilmar cut down her IVF's ? Why is her b/p on the lower side ? Elevated FT4 but normal TSH will continue to monitor. May need repeat in few weeks.     Consultants:  Cardiology    General Orders:  Repeat basic labs again in am.  I have explained to the patient and discussed with him/her the treatment plan.   Enrique Allen MD, [address]

## 2021-03-19 NOTE — CARE COORDINATION
Met c pt to initiate discharge planning. Pt lives at home c her brother, typically ind c ADLs. Pt does not drive but her brother Colleen Lucian takes her to appointments and son Tara Arambula helps with grocery shopping. Pt has pcp/ active insurance and can afford meds. Discussed option of HHC and pt agreeable, no pref for provider. Sent referral to [facility] with [facility] to follow. Pt states she also has a walker at home that she can use until she gets stronger. Advised CM available if additional needs arise.          Discharging Nurse: Please notify [facility] upon discharge, [phone], they will provide fax number to send [facility] order & AVS.

## 2021-03-19 NOTE — PROGRESS NOTES
Occupational [address] [place] [address] ACUTE CARE OCCUPATIONAL THERAPY EVALUATION    Toi Christine, 1940, [id number], 3/19/2021    Discharge Recommendation: Home with initial 24 hour supervision/assistance, Home Health OT services (S Level 2)      History:  [place]:  The primary encounter diagnosis was General weakness. Diagnoses of BALBIR (acute kidney injury) ([address] Elevated troponin, Hypokalemia, Elevated brain natriuretic peptide (BNP) level, and Leukocytes in urine were also pertinent to this visit. Subjective:  Patient states: \"I was hoping someone could get me a recliner and I could sit up. \"  Pain: Pt denied pain this date  Communication with other providers: JESSICA Monzon  Restrictions: General Precautions, Low Fall Risk, Telemetry, IV, Bed/chair alarm    Home Setup/Prior level of function:  Social/Functional History  Lives With: Family (Brother who is in good health)  Type of Home: House  Home Layout: One level  Home Access: Stairs to enter without rails  Entrance Stairs - Number of Steps: 2 (deep enough for walker)  Bathroom Shower/Tub: Walk-in shower  Bathroom Toilet: Standard  Bathroom Equipment: Shower chair  Bathroom Accessibility: Accessible  Home Equipment: Rolling walker, Cane  ADL Assistance: Independent (reports increasing difficulty with donning footwear)  [address]: Independent  Homemaking Responsibilities: Yes  Ambulation Assistance: Independent (typically uses no AD, reports considering transition to [employer])  Transfer Assistance: Independent  Active : No (brother drives)  Occupation: Retired  Leisure & Hobbies: Watching deer, TV    Examination:  · Observation: Supine in bed upon arrival. Pleasant and agreeable to evaluation. Son present and supportive.    · Vision: WFL (Glasses)  · [facility]  · Vitals: Stable vitals throughout session    Body Systems and functions:  · ROM: WFL all joints in BL UEs  · Strength: 4 to 4+/5 MMT all major muscle groups BL UEs  · Sensation: urinal? [] 1    [] 2   [] 2   [] 3   [x] 3   [] 4     4. Putting on and taking off regular upper body clothing? [] 1   [] 2   [] 2   [] 3   [] 3    [x] 4      5. Taking care of personal grooming such as brushing teeth? [] 1   [] 2    [] 2 [] 3    [] 3   [x] 4      6. Eating meals? [] 1   [] 2   [] 2   [] 3   [] 3   [x] 4      Raw Score:  20    [24=0% impaired(CH), 23=1-19%(CI), 20-22=20-39%(CJ), 15-19=40-59%(CK), 10-14=60-79%(CL), 7-9=80-99%(CM), 6=100%(CN)]     Treatment:  Self Care Training:   Cues were given for safety, sequence, UE/LE placement, visual cues, and balance. Activities performed today included UB/LB dressing tasks, toileting, hand hygiene, facial hygiene, and oral hygiene tasks at sink      Safety Measures: Gait belt used, Left in Chair, Alarm in place    Assessment:  Pt is an 78year old female with a past medical history of Family history of abdominal aortic aneurysm, Family history of colon cancer, Fracture of left humerus, Hypertension, Hypothyroidism, postsurgical, Lumbar Spinal Stenosis, Multinodular goiter, VILLANUEVA (nonalcoholic steatohepatitis), Necrobiosis lipoidica diabeticorum ([facility] [address] Obesity, Class II, BMI 35-39.9, with comorbidity, Osteoporosis, Polymyalgia rheumatica ([facility] [address] RBBB, Type II Diabetes Mellitus, and Urolithiasis. Pt admitted with generalized weakness and diagnosed with BALBIR, acute cystitis, and dehydration. Pt lives at home with her brother and at baseline is independent with ADLs, IADLs, and mobility. Pt having some difficulty with LB ADLs which can be addressed by [facility] OT services/LB AE, but overall performed very well this date. Recommend discharge to home with initial 24 hour supervision from brother and [facility] OT services. Complexity: Moderate  Prognosis: Good  Plan: 3x/week      Goals:  1. Pt will complete all aspects of bed mobility for EOB/OOB ADLs mod I with HOB flat  2. Pt will complete UB/LB bathing min A with setup using long handled sponge  3.  Pt will complete all aspects of LB dressing min A with setup using LB AE  4. Pt will complete all functional transfers to and from bed, chair, toilet, shower chair mod I with use of grab bars PRN  5. Pt will ambulate HH distance to bathroom for toileting mod I with RW  6. Pt will complete all aspects of toileting task independently  7. Pt will complete oral hygiene/grooming routine in standing at sink independently   8.  Pt will complete ther ex/ther act with focus on UE strengthening, functional standing tolerance >10 minutes, dynamic standing balance for ADL/IADL tasks      Time:   Time in: 917  Time out: 946  Timed treatment minutes: 14  Total time: 29      Electronically signed by:    SHOAIB Gray/L, [place], .[id number]

## 2021-03-20 LAB
ANION GAP SERPL CALCULATED.3IONS-SCNC: 11 MMOL/L (ref 4–16)
BASOPHILS ABSOLUTE: 0.1 K/CU MM
BASOPHILS RELATIVE PERCENT: 1 % (ref 0–1)
BUN BLDV-MCNC: 22 MG/DL (ref 6–23)
CALCIUM SERPL-MCNC: 7.8 MG/DL (ref 8.3–10.6)
CHLORIDE BLD-SCNC: 104 MMOL/L (ref 99–110)
CO2: 23 MMOL/L (ref 21–32)
CREAT SERPL-MCNC: 1.1 MG/DL (ref 0.6–1.1)
DIFFERENTIAL TYPE: ABNORMAL
EOSINOPHILS ABSOLUTE: 0.2 K/CU MM
EOSINOPHILS RELATIVE PERCENT: 2 % (ref 0–3)
GFR AFRICAN AMERICAN: 58 ML/MIN/1.73M2
GFR NON-AFRICAN AMERICAN: 48 ML/MIN/1.73M2
GLUCOSE BLD-MCNC: 83 MG/DL (ref 70–99)
HCT VFR BLD CALC: 36.1 % (ref 37–47)
HEMOGLOBIN: 11.4 GM/DL (ref 12.5–16)
IMMATURE NEUTROPHIL %: 0.5 % (ref 0–0.43)
LYMPHOCYTES ABSOLUTE: 1.7 K/CU MM
LYMPHOCYTES RELATIVE PERCENT: 20.6 % (ref 24–44)
MCH RBC QN AUTO: 27.9 PG (ref 27–31)
MCHC RBC AUTO-ENTMCNC: 31.6 % (ref 32–36)
MCV RBC AUTO: 88.3 FL (ref 78–100)
MONOCYTES ABSOLUTE: 0.6 K/CU MM
MONOCYTES RELATIVE PERCENT: 7.5 % (ref 0–4)
NUCLEATED RBC %: 0 %
PDW BLD-RTO: 14 % (ref 11.7–14.9)
PLATELET # BLD: 169 K/CU MM (ref 140–440)
PMV BLD AUTO: 10.5 FL (ref 7.5–11.1)
POTASSIUM SERPL-SCNC: 3.5 MMOL/L (ref 3.5–5.1)
PROCALCITONIN: 0.12
RBC # BLD: 4.09 M/CU MM (ref 4.2–5.4)
SEGMENTED NEUTROPHILS ABSOLUTE COUNT: 5.7 K/CU MM
SEGMENTED NEUTROPHILS RELATIVE PERCENT: 68.4 % (ref 36–66)
SODIUM BLD-SCNC: 138 MMOL/L (ref 135–145)
TOTAL IMMATURE NEUTOROPHIL: 0.04 K/CU MM
TOTAL NUCLEATED RBC: 0 K/CU MM
WBC # BLD: 8.4 K/CU MM (ref 4–10.5)

## 2021-03-20 PROCEDURE — 6360000002 HC RX W HCPCS: Performed by: NURSE PRACTITIONER

## 2021-03-20 PROCEDURE — 94761 N-INVAS EAR/PLS OXIMETRY MLT: CPT

## 2021-03-20 PROCEDURE — 2580000003 HC RX 258: Performed by: NURSE PRACTITIONER

## 2021-03-20 PROCEDURE — 84145 PROCALCITONIN (PCT): CPT

## 2021-03-20 PROCEDURE — 96366 THER/PROPH/DIAG IV INF ADDON: CPT

## 2021-03-20 PROCEDURE — 80048 BASIC METABOLIC PNL TOTAL CA: CPT

## 2021-03-20 PROCEDURE — G0378 HOSPITAL OBSERVATION PER HR: HCPCS

## 2021-03-20 PROCEDURE — APPSS60 APP SPLIT SHARED TIME 46-60 MINUTES: Performed by: NURSE PRACTITIONER

## 2021-03-20 PROCEDURE — 36415 COLL VENOUS BLD VENIPUNCTURE: CPT

## 2021-03-20 PROCEDURE — 6370000000 HC RX 637 (ALT 250 FOR IP): Performed by: NURSE PRACTITIONER

## 2021-03-20 PROCEDURE — 96372 THER/PROPH/DIAG INJ SC/IM: CPT

## 2021-03-20 PROCEDURE — 85025 COMPLETE CBC W/AUTO DIFF WBC: CPT

## 2021-03-20 PROCEDURE — 99214 OFFICE O/P EST MOD 30 MIN: CPT | Performed by: INTERNAL MEDICINE

## 2021-03-20 RX ORDER — METOPROLOL SUCCINATE 25 MG/1
12.5 TABLET, EXTENDED RELEASE ORAL DAILY
Status: DISCONTINUED | OUTPATIENT
Start: 2021-03-20 | End: 2021-03-22 | Stop reason: HOSPADM

## 2021-03-20 RX ADMIN — Medication 2000 UNITS: at 08:46

## 2021-03-20 RX ADMIN — METOPROLOL SUCCINATE 12.5 MG: 25 TABLET, EXTENDED RELEASE ORAL at 17:34

## 2021-03-20 RX ADMIN — SODIUM CHLORIDE, PRESERVATIVE FREE 10 ML: 5 INJECTION INTRAVENOUS at 08:46

## 2021-03-20 RX ADMIN — CEFTRIAXONE 1000 MG: 1 INJECTION, POWDER, FOR SOLUTION INTRAMUSCULAR; INTRAVENOUS at 20:28

## 2021-03-20 RX ADMIN — LEVOTHYROXINE SODIUM 100 MCG: 100 TABLET ORAL at 08:46

## 2021-03-20 RX ADMIN — ENOXAPARIN SODIUM 40 MG: 40 INJECTION SUBCUTANEOUS at 17:34

## 2021-03-20 ASSESSMENT — PAIN SCALES - GENERAL: PAINLEVEL_OUTOF10: 0

## 2021-03-20 NOTE — PROGRESS NOTES
Cardiology Progress Note     Today's Plan: stop IVF, start Toprol XL    Admit Date:  3/18/2021    Consult reason/ Seen today for: elevated troponin    Subjective and  Overnight Events:  Denies any cardiac complaints       Assessment / Plan / Recommendation:     1. HFrEF/ HFpEF ?: EF noted to be low on stress test, echo pending. Acute on chronic decompensated heart failure. Appears to be volume overloaded. Fluid status documentation-accurate I's and O's. BNP 12,000 x-ray does not show pulmonary edema. Stop IVF, patient is taking in good PO. Start Toprol XL 12.5 mg daily, soft B/P.   2. R/O CAD: mild elevated troponin, NM abnormal EF 35%, awaiting echocardiogram may need LHC. 3. HTN:mild hypotension not currently on any medication   4. Decondition: dehydration, multiple falls. 5. Acute cystitis: primary following  6. DVT prophylaxis if not contraindicated. 7. Can get echo as OP if medically stable for discharge. History of Presenting Illness:    Chief complain on admission : 78 y. o.year old who is admitted for  Chief Complaint   Patient presents with    Fatigue     decline in health over recent months        Past medical history:    has a past medical history of Family history of abdominal aortic aneurysm, Family history of colon cancer, Fracture of left humerus, Hypertension, Hypothyroidism, postsurgical, Lumbar Spinal Stenosis, Multinodular goiter, VILLANUEVA (nonalcoholic steatohepatitis), Necrobiosis lipoidica diabeticorum ([address] Obesity, Class II, BMI 35-39.9, with comorbidity, Osteoporosis, Polymyalgia rheumatica ([address] RBBB, Type II Diabetes Mellitus, and Urolithiasis. Past surgical history:   has a past surgical history that includes mane and bso (cervix removed); Cholecystectomy, laparoscopic (1996); Thyroid lobectomy (Left, 1995); orif humerus decompression (Right, 12/2006); Shoulder arthroscopy (Right, ?when);  Anus surgery (?when); Knee arthroscopy (Right, 2008); Elbow fracture surgery (Left, 1/2011); Cataract removal with implant (Bilateral, 10+ yrs ago); Colonoscopy (last one 5+ ys ago ); Tonsillectomy (as a child); and Carpal tunnel release (Right, 3/18/2019). Social History:   reports that she has never smoked. She has never used smokeless tobacco. She reports that she does not drink alcohol or use drugs. Family history:  family history includes Cancer (age of onset: 68) in her brother; Heart Disease in her brother; Other in her brother, father, and son; Stroke in her father. Allergies   Allergen Reactions    Penicillins Rash       Review of Systems:  Review of Systems   Musculoskeletal: Positive for gait problem. All other systems reviewed and are negative. BP (!) 107/57   Pulse 96   Temp 98.3 °F (36.8 °C) (Oral)   Resp 16   Ht 5' 3\" (1.6 m)   Wt 179 lb 12.8 oz (81.6 kg)   SpO2 96%   BMI 31.85 kg/m²       Intake/Output Summary (Last 24 hours) at 3/20/2021 1138  Last data filed at 3/20/2021 1010  Gross per 24 hour   Intake 360 ml   Output    Net 360 ml       Physical Exam:  Constitutional:  Well developed, Well nourished, No acute distress  HENT:  Normocephalic, Atraumatic, Bilateral external ears normal,  Nose normal.   Neck- trachea is midline  Eyes:  PERRL, Conjunctiva normal  Respiratory:  Normal breath sounds, No respiratory distress, No wheezing, No chest tenderness. Cardiovascular:  Normal heart rate, Normal rhythm, no murmurs appreciated, No rubs appreciated, No gallops appreciated, JVP not elevated  Abdomen/GI:  Soft, No tenderness  Musculoskeletal:  Intact distal pulses, mild edema, No tenderness, No cyanosis, No clubbing. Integument:  Warm, Dry  Lymphatic:  No lymphadenopathy noted.    Neurologic:  Alert & oriented  Psychiatric:  Affect and Mood :pleasant     Medications:    azithromycin  500 mg Intravenous Q24H    cefTRIAXone (ROCEPHIN) IV  1,000 mg Intravenous Q24H    levothyroxine 100 mcg Oral Daily    vitamin D  2 capsule Oral Daily    sodium chloride flush  10 mL Intravenous 2 times per day    enoxaparin  40 mg Subcutaneous QPM      sodium chloride 50 mL/hr at 03/19/21 1805     sodium chloride flush, promethazine **OR** ondansetron, acetaminophen **OR** acetaminophen, polyethylene glycol    Lab Data:  CBC:   Recent Labs     03/18/21  1120 03/19/21  1108 03/20/21  0444   WBC 9.7 9.5 8.4   HGB 12.7 12.8 11.4*   HCT 39.0 41.2 36.1*   MCV 88.8 89.8 88.3    192 169     BMP:   Recent Labs     03/19/21  0011 03/19/21  1108 03/20/21  0444    133* 138   K 3.0* 3.6 3.5    100 104   CO2 24 25 23   BUN 27* 24* 22   CREATININE 1.2* 1.1 1.1     PT/INR: No results for input(s): PROTIME, INR in the last 72 hours. BNP:    Recent Labs     03/18/21  1215   PROBNP 12,096*     TROPONIN:   Recent Labs     03/18/21  1130 03/18/21  1703 03/19/21  0011   TROPONINT 0.072* 0.064* 0.074*            NM Myocardial Spect  Normal tracer uptake in all segments of myocardium on stress ans rest    images. Normal Lexiscan nuclear scintigraphic study suggestive of normal    myocardial perfusion. Gated images demonstrate abnormal left ventricular    systolic function with EF of 35 %. Impression:  Active Problems:    Acute cystitis without hematuria    General weakness  Resolved Problems:    * No resolved hospital problems. *       All labs, medications and tests reviewed by myself, continue all other medications of all above medical condition listed as is except for changes mentioned above. Thank you   Please call with questions. Electronically signed by Rj Real. NICOLA Peterson CNP on 3/20/2021 at 11:38 AM         CARDIOLOGY ATTENDING ADDENDUM    I have seen, spoken to and examined this patient personally, independently of the nurse practitioner. I have reviewed the hospital care given to date and reviewed all pertinent labs and imaging. The plan was developed mutually at the time of the visit with the patient,  NP   and myself. I have spoken with patient, nursing staff and provided written and verbal instructions . The above note has been reviewed and I agree with the assessment, diagnosis, and treatment plan with changes made by me as follows       HPI:  I have reviewed the above HPI  And agree with above   Please review addendum/changes made to note above     Interval history:            Physical Exam:  General:  Awake, alert, NAD  Head:normal  Eye:normal  Neck:  No JVD   Chest:  Clear to auscultation, respiration easy  Cardiovascular:  s1s2  Abdomen:   nontender  Extremities:  +1 edema  Pulses; palpable  Neuro: grossly normal      MEDICAL DECISION MAKING;    I agree with the above plan, which was planned by myself and discussed with NP.     Hold off IVF  Start Toprol XL   Med mangement   MPI no ischemia   Echocardiogram pending - Monday   Cont med mgt in the [facility]     Dr. Karri Garrison MD

## 2021-03-20 NOTE — PROGRESS NOTES
Hospitalist Progress Note         Admit Date: 3/18/2021    PCP: Azalia Taylor MD     Chief Complaint   Patient presents with    Fatigue     decline in health over recent months        Assessment and Plan:     -Possible left lower lobe pneumonia continue azithromycin/Rocephin.  -Concern for UTI Rocephin should be adequate.  -Hypothyroidism continue current Synthroid. TSH 2.8.  -Slightly elevated troponin/CAD no ACS.   Normal stress test.  Echo pending  -Generalized weakness/failure to thrive PT/OT recommended IP rehab    VTE prophylaxis LMWH    Current Facility-Administered Medications   Medication Dose Route Frequency Provider Last Rate Last Admin    azithromycin (ZITHROMAX) 500 mg in dextrose 5 % 250 mL IVPB  500 mg Intravenous Q24H Rosanna Ruvalcaba MD   Stopped at 03/19/21 1906    0.9 % sodium chloride infusion   Intravenous Continuous Rosanna Ruvalcaba MD 50 mL/hr at 03/19/21 1805 New Bag at 03/19/21 1805    cefTRIAXone (ROCEPHIN) 1000 mg IVPB in 50 mL D5W minibag  1,000 mg Intravenous Q24H NICOLA Smith CNP   Stopped at 03/19/21 1530    levothyroxine (SYNTHROID) tablet 100 mcg  100 mcg Oral Daily NIOCLA Smith CNP   100 mcg at 03/20/21 0846    vitamin D CAPS 2,000 Units  2 capsule Oral Daily NICOLA Smith CNP   2,000 Units at 03/20/21 0846    sodium chloride flush 0.9 % injection 10 mL  10 mL Intravenous 2 times per day NICOLA Smith CNP   10 mL at 03/20/21 0846    sodium chloride flush 0.9 % injection 10 mL  10 mL Intravenous PRN NICOLA Smith CNP        enoxaparin (LOVENOX) injection 40 mg  40 mg Subcutaneous QPM NICOLA Smith CNP   40 mg at 03/19/21 1813    promethazine (PHENERGAN) tablet 12.5 mg  12.5 mg Oral Q6H PRN NICOLA Smith CNP        Or    ondansetron (ZOFRAN) injection 4 mg  4 mg Intravenous Q6H PRN NICOLA Smith - CNP        acetaminophen (TYLENOL) tablet 650 mg  650 mg Oral Q6H PRN NICOLA Smith CNP Or    acetaminophen (TYLENOL) suppository 650 mg  650 mg Rectal Q6H PRN NICOLA Rivera CNP        polyethylene glycol (GLYCOLAX) packet 17 g  17 g Oral Daily PRN NICOLA Rivera CNP           Subjective:     Patient denied any new complaints. No acute overnight events. Tolerating diet. Objective: Intake/Output Summary (Last 24 hours) at 3/20/2021 0859  Last data filed at 3/19/2021 1815  Gross per 24 hour   Intake 130 ml   Output    Net 130 ml      Vitals:   Vitals:    03/20/21 0845   BP: (!) 107/57   Pulse: 96   Resp: 16   Temp: 98.3 °F (36.8 °C)   SpO2:      Physical Exam:  General Appearance:    Alert, cooperative, no distress  Head:      Normocephalic, without obvious abnormality, atraumatic  Eyes:       Conjunctiva/corneas clear, EOM's intact  Lungs:    CTA B/L  Heart:                RRR, S1 and S2 normal, no murmur,   rub or gallop  Abdomen:     Soft, non-tender, bowel sounds +  Extremities:   Extremities normal, atraumatic, no cyanosis or edema  Neurological:   Grossly Intact. Significant Diagnostic Studies:   DATA:    CBC   Recent Labs     03/18/21  1120 03/19/21  1108 03/20/21  0444   WBC 9.7 9.5 8.4   HGB 12.7 12.8 11.4*   HCT 39.0 41.2 36.1*    192 169      BMP   Recent Labs     03/19/21  0011 03/19/21  1108 03/20/21  0444    133* 138   K 3.0* 3.6 3.5    100 104   CO2 24 25 23   BUN 27* 24* 22   CREATININE 1.2* 1.1 1.1     LFT'S   Recent Labs     03/18/21  1120   AST 18   ALT 12   BILITOT 1.0   ALKPHOS 46     COAG No results for input(s): INR in the last 72 hours.   POC:   Lab Results   Component Value Date    POCGLU 96 03/18/2019    POCGLU 92 08/12/2014     [id number]:  Lab Results   Component Value Date    LABA1C 5.3 03/18/2021     CARDIAC ENZYMES    Recent Labs     03/18/21  1120   CKTOTAL 45     Troponin:   Recent Labs     03/18/21  1130 03/18/21  1703 03/19/21  0011   TROPONINT 0.072* 0.064* 0.074*     BNP:   Recent Labs     03/18/21  1215 PROBNP 12,096*     U/A:    Lab Results   Component Value Date    COLORU YELLOW 03/18/2021    WBCUA 18 03/18/2021    RBCUA 2 03/18/2021    MUCUS RARE 03/18/2021    BACTERIA NEGATIVE 03/18/2021    CLARITYU HAZY 03/18/2021    SPECGRAV 1.020 03/18/2021    LEUKOCYTESUR LARGE 03/18/2021    BLOODU NEGATIVE 03/18/2021       Ct Head Wo Contrast    Result Date: 3/18/2021  EXAMINATION: CT OF THE HEAD WITHOUT CONTRAST  3/18/2021 12:06 pm TECHNIQUE: CT of the head was performed without the administration of intravenous contrast. Dose modulation, iterative reconstruction, and/or weight based adjustment of the mA/kV was utilized to reduce the radiation dose to as low as reasonably achievable. COMPARISON: 02/27/2014 HISTORY: ORDERING SYSTEM PROVIDED HISTORY: generalized weakness, fall x6 weeks TECHNOLOGIST PROVIDED HISTORY: Has a \"code stroke\" or \"stroke alert\" been called? ->No Reason for exam:->generalized weakness, fall x6 weeks Decision Support Exception->Emergency Medical Condition (MA) Reason for Exam: generalized weakness, fall x6 weeks FINDINGS: BRAIN/VENTRICLES: There is no acute intracranial hemorrhage, mass effect or midline shift. No abnormal extra-axial fluid collection. The gray-white differentiation is maintained without evidence of an acute infarct. There is no evidence of hydrocephalus. Intracranial atherosclerotic calcifications are identified. Patchy periventricular and subcortical white matter low attenuation is identified related to chronic microvascular ischemic change. Generalized age-appropriate cortical atrophy. No intracranial mass is identified. ORBITS: The visualized portion of the orbits demonstrate no acute abnormality. SINUSES: The visualized paranasal sinuses and mastoid air cells demonstrate no acute abnormality. SOFT TISSUES/SKULL:  No acute abnormality of the visualized skull or soft tissues. Stable appearing CT scan of the head without acute intracranial abnormality.  Chronic ------------------------------------------------------------------  Procedure Procedure Type:   Nuclear Stress Test:Pharmacological, Myocardial Perfusion Imaging with  Pharm, NM MYOCARDIAL SPECT REST EXERCISE OR RX  Indications: Elevated Troponins. Risk Factors   The patient risk factors include:obesity, hypertension and diabetes  mellitus. Stress Protocols   Resting ECG  RBBB. Prolonged QT interval.  Abnormal at Rest.   Resting HR:92 bpm  Resting BP:117/68 mmHg  Stress Protocol:Pharmacologic - Lexiscan  Peak HR:104 bpm              HR/BP product:44402  Peak BP:117/68 mmHg  Predicted HR: 138 bpm  % of predicted HR: 75   Exercise duration: 01:04 min   Symptoms  Nausea. The patient was given an intravenous injection  of Aminophylline to relieve symptoms from  [place]. Complications  Procedure complication was none. Procedure Medications   - Lexiscan I.V. bolus (over 15sec.) 0.4 mg admininstered @ 03/19/2021 12:45.   - Aminophylline I.V. 75 mg admininstered @ 03/19/2021 12:48. Imaging Protocols   Rest                             Stress   Isotope:Sestamibi 99mTc          Isotope: Sestamibi 99mTc  Isotope dose:11 mCi              Isotope dose:33 mCi  Administration route: I.V. Administration route: I.V. Injection Date:03/19/2021 10:35  Injection Date:03/19/2021 12:45  Scan Date:03/19/2021 11:20       Scan Date:03/19/2021 13:30   Technique:        SPECT          Technique:        Gated                                                     SPECT   Procedure Description   Upon patient arrival, the patient is identified using two identifiers and  the physician order is verified. An IV is established and 8-11mCi of 99mTc  Sestamibi is intravenously injected and followed with 10mL 0.9% Normal  Saline flush. A circulation period of 45 minutes occurs prior to resting  SPECT imaging. After imaging is complete the patient is escorted to the  stress lab.  The patient is connected to the ECG and blood pressure is measured. The RN starts the stress portion of the exam and rapidly  intravenously injects Lexiscan (regadenosine) 0.4mg over a period of 10 to15  seconds and follows with 5mL 0.9% Normal Saline flush. Immediately following  the Nuclear Technologist intravenously injects 22-33mCi of 99mTc Sestamibi  and 5mL 0.9% Normal Saline flush. After completion, recovery, and removal of  the IV, the patient rests during the second circulation period of 45  minutes. Final stress SPECT gated imaging is performed. The patient may  return home or to their room after stress imaging. The images are processed  and final charting is completed and sent to the appropriate cardiologist for  interpretation and reporting. Perfusion Interpretation   Normal tracer uptake in all segments of myocardium on stress ans rest  images. Imaging Results    Summed scores     - Summed stress score: 7     - Summed rest score: 4     - Summed difference score:    3   Rest ejection  Ejection fraction:36 %  EDV :66 ml  ESV :42 ml  Stroke volume :24 ml  Medical History   Accession#:  [id number]  Admission Data Admission date: 03/18/2021 Admission Time: 10:53 Hospital Status: Outpatient.           [facility]

## 2021-03-20 NOTE — PROGRESS NOTES
Report called to Lila Singh  Patient going to room 1122    Patients son was notified in the room of transfer.

## 2021-03-21 LAB
ANION GAP SERPL CALCULATED.3IONS-SCNC: 11 MMOL/L (ref 4–16)
BASOPHILS ABSOLUTE: 0.1 K/CU MM
BASOPHILS RELATIVE PERCENT: 0.9 % (ref 0–1)
BUN BLDV-MCNC: 18 MG/DL (ref 6–23)
CALCIUM SERPL-MCNC: 8 MG/DL (ref 8.3–10.6)
CHLORIDE BLD-SCNC: 105 MMOL/L (ref 99–110)
CO2: 23 MMOL/L (ref 21–32)
CREAT SERPL-MCNC: 1.1 MG/DL (ref 0.6–1.1)
DIFFERENTIAL TYPE: ABNORMAL
EOSINOPHILS ABSOLUTE: 0.2 K/CU MM
EOSINOPHILS RELATIVE PERCENT: 2.3 % (ref 0–3)
GFR AFRICAN AMERICAN: 58 ML/MIN/1.73M2
GFR NON-AFRICAN AMERICAN: 48 ML/MIN/1.73M2
GLUCOSE BLD-MCNC: 92 MG/DL (ref 70–99)
HCT VFR BLD CALC: 33.7 % (ref 37–47)
HEMOGLOBIN: 11 GM/DL (ref 12.5–16)
IMMATURE NEUTROPHIL %: 0.6 % (ref 0–0.43)
LYMPHOCYTES ABSOLUTE: 1.8 K/CU MM
LYMPHOCYTES RELATIVE PERCENT: 20.3 % (ref 24–44)
MCH RBC QN AUTO: 28.8 PG (ref 27–31)
MCHC RBC AUTO-ENTMCNC: 32.6 % (ref 32–36)
MCV RBC AUTO: 88.2 FL (ref 78–100)
MONOCYTES ABSOLUTE: 0.7 K/CU MM
MONOCYTES RELATIVE PERCENT: 8.5 % (ref 0–4)
NUCLEATED RBC %: 0 %
PDW BLD-RTO: 14 % (ref 11.7–14.9)
PLATELET # BLD: 161 K/CU MM (ref 140–440)
PMV BLD AUTO: 10.5 FL (ref 7.5–11.1)
POTASSIUM SERPL-SCNC: 3.3 MMOL/L (ref 3.5–5.1)
RBC # BLD: 3.82 M/CU MM (ref 4.2–5.4)
SEGMENTED NEUTROPHILS ABSOLUTE COUNT: 5.9 K/CU MM
SEGMENTED NEUTROPHILS RELATIVE PERCENT: 67.4 % (ref 36–66)
SODIUM BLD-SCNC: 139 MMOL/L (ref 135–145)
TOTAL IMMATURE NEUTOROPHIL: 0.05 K/CU MM
TOTAL NUCLEATED RBC: 0 K/CU MM
WBC # BLD: 8.7 K/CU MM (ref 4–10.5)

## 2021-03-21 PROCEDURE — 94761 N-INVAS EAR/PLS OXIMETRY MLT: CPT

## 2021-03-21 PROCEDURE — 36415 COLL VENOUS BLD VENIPUNCTURE: CPT

## 2021-03-21 PROCEDURE — 99215 OFFICE O/P EST HI 40 MIN: CPT | Performed by: INTERNAL MEDICINE

## 2021-03-21 PROCEDURE — 80048 BASIC METABOLIC PNL TOTAL CA: CPT

## 2021-03-21 PROCEDURE — 85025 COMPLETE CBC W/AUTO DIFF WBC: CPT

## 2021-03-21 PROCEDURE — 2580000003 HC RX 258: Performed by: INTERNAL MEDICINE

## 2021-03-21 PROCEDURE — 6370000000 HC RX 637 (ALT 250 FOR IP): Performed by: NURSE PRACTITIONER

## 2021-03-21 PROCEDURE — 6360000002 HC RX W HCPCS: Performed by: NURSE PRACTITIONER

## 2021-03-21 PROCEDURE — 96366 THER/PROPH/DIAG IV INF ADDON: CPT

## 2021-03-21 PROCEDURE — 6370000000 HC RX 637 (ALT 250 FOR IP): Performed by: INTERNAL MEDICINE

## 2021-03-21 PROCEDURE — 2580000003 HC RX 258: Performed by: NURSE PRACTITIONER

## 2021-03-21 PROCEDURE — G0378 HOSPITAL OBSERVATION PER HR: HCPCS

## 2021-03-21 PROCEDURE — 96372 THER/PROPH/DIAG INJ SC/IM: CPT

## 2021-03-21 PROCEDURE — 6360000002 HC RX W HCPCS: Performed by: INTERNAL MEDICINE

## 2021-03-21 RX ORDER — HEPARIN SODIUM (PORCINE) LOCK FLUSH IV SOLN 100 UNIT/ML 100 UNIT/ML
500 SOLUTION INTRAVENOUS PRN
Status: DISCONTINUED | OUTPATIENT
Start: 2021-03-21 | End: 2021-03-21

## 2021-03-21 RX ORDER — FUROSEMIDE 20 MG/1
20 TABLET ORAL DAILY
Status: DISCONTINUED | OUTPATIENT
Start: 2021-03-21 | End: 2021-03-22 | Stop reason: HOSPADM

## 2021-03-21 RX ADMIN — ENOXAPARIN SODIUM 40 MG: 40 INJECTION SUBCUTANEOUS at 18:29

## 2021-03-21 RX ADMIN — LEVOTHYROXINE SODIUM 100 MCG: 100 TABLET ORAL at 09:39

## 2021-03-21 RX ADMIN — Medication: at 09:39

## 2021-03-21 RX ADMIN — FUROSEMIDE 20 MG: 20 TABLET ORAL at 12:35

## 2021-03-21 RX ADMIN — AZITHROMYCIN MONOHYDRATE 500 MG: 500 INJECTION, POWDER, LYOPHILIZED, FOR SOLUTION INTRAVENOUS at 12:36

## 2021-03-21 RX ADMIN — CEFTRIAXONE: 1 INJECTION, POWDER, FOR SOLUTION INTRAMUSCULAR; INTRAVENOUS at 14:38

## 2021-03-21 RX ADMIN — POLYETHYLENE GLYCOL (3350) 17 G: 17 POWDER, FOR SOLUTION ORAL at 09:48

## 2021-03-21 RX ADMIN — METOPROLOL SUCCINATE 25 MG: 25 TABLET, EXTENDED RELEASE ORAL at 09:40

## 2021-03-21 RX ADMIN — SODIUM CHLORIDE, PRESERVATIVE FREE 10 ML: 5 INJECTION INTRAVENOUS at 21:15

## 2021-03-21 RX ADMIN — SODIUM CHLORIDE, PRESERVATIVE FREE 10 ML: 5 INJECTION INTRAVENOUS at 09:39

## 2021-03-21 ASSESSMENT — PAIN SCALES - GENERAL
PAINLEVEL_OUTOF10: 0

## 2021-03-21 NOTE — PROGRESS NOTES
Hospitalist Progress Note         Admit Date: 3/18/2021    PCP: Kaleb Pacheco MD     Chief Complaint   Patient presents with    Fatigue     decline in health over recent months        Assessment and Plan:     -Possible left lower lobe pneumonia continue azithromycin/Rocephin.  -Concern for UTI Rocephin should be adequate.  -Hypothyroidism continue current Synthroid. TSH 2.8.  -Slightly elevated troponin/CAD no ACS. Normal stress test.Start BB and Lasix. Due to low EF on stress test wait for complete echo. May need LHC. -Generalized weakness/failure to thrive PT/OT recommended IP rehab    VTE prophylaxis LMWH    Current Facility-Administered Medications   Medication Dose Route Frequency Provider Last Rate Last Admin    furosemide (LASIX) tablet 20 mg  20 mg Oral Daily Ray Lynch MD   20 mg at 03/21/21 1235    metoprolol succinate (TOPROL XL) extended release tablet 12.5 mg  12.5 mg Oral Daily Jadiel Walters APRN - CNP   25 mg at 03/21/21 0940    azithromycin (ZITHROMAX) 500 mg in dextrose 5 % 250 mL IVPB  500 mg Intravenous Q24H Enrique Allen MD   Stopped at 03/21/21 1423    cefTRIAXone (ROCEPHIN) 1000 mg IVPB in 50 mL D5W minibag  1,000 mg Intravenous Q24H Vermilion Lyle, APRN -  mL/hr at 03/21/21 1438 New Bag at 03/21/21 1438    levothyroxine (SYNTHROID) tablet 100 mcg  100 mcg Oral Daily Darby Lyle, APRN - CNP   100 mcg at 03/21/21 0939    vitamin D CAPS 2,000 Units  2 capsule Oral Daily Vermilion Lyle, APRN - CNP   Given at 03/21/21 0560    sodium chloride flush 0.9 % injection 10 mL  10 mL Intravenous 2 times per day Vermilion Lyle, APRN - CNP   10 mL at 03/21/21 0939    sodium chloride flush 0.9 % injection 10 mL  10 mL Intravenous PRN Darby Lyle, APRN - CNP        enoxaparin (LOVENOX) injection 40 mg  40 mg Subcutaneous QPM Vermilion Lyle, APRN - CNP   40 mg at 03/20/21 1734    promethazine (PHENERGAN) tablet 12.5 mg  12.5 mg Oral Q6H PRN Darby Lyle, APRN - CNP        Or    ondansetron (ZOFRAN) injection 4 mg  4 mg Intravenous Q6H PRN Rushie Hero, APRN - CNP        acetaminophen (TYLENOL) tablet 650 mg  650 mg Oral Q6H PRN Rushie Hero, APRN - CNP        Or    acetaminophen (TYLENOL) suppository 650 mg  650 mg Rectal Q6H PRN Rushie Hero, APRN - CNP        polyethylene glycol (GLYCOLAX) packet 17 g  17 g Oral Daily PRN Rushie Hero, APRN - CNP   17 g at 03/21/21 0948       Subjective:     Patient denied any new complaints. No acute overnight events. Tolerating diet. Objective: Intake/Output Summary (Last 24 hours) at 3/21/2021 1535  Last data filed at 3/21/2021 0834  Gross per 24 hour   Intake 240 ml   Output    Net 240 ml      Vitals:   Vitals:    03/21/21 0939   BP: (!) 102/58   Pulse: 82   Resp: 16   Temp: 97.9 °F (36.6 °C)   SpO2: 97%     Physical Exam:  General Appearance:    Alert, cooperative, no distress  Head:      Normocephalic, without obvious abnormality, atraumatic  Eyes:       Conjunctiva/corneas clear, EOM's intact  Lungs:    CTA B/L  Heart:                RRR, S1 and S2 normal, no murmur,   rub or gallop  Abdomen:     Soft, non-tender, bowel sounds +  Extremities:   Trace B/L pedal edema +  Neurological:   Grossly Intact. Significant Diagnostic Studies:   DATA:    CBC   Recent Labs     03/19/21  1108 03/20/21  0444 03/21/21  0618   WBC 9.5 8.4 8.7   HGB 12.8 11.4* 11.0*   HCT 41.2 36.1* 33.7*    169 161      BMP   Recent Labs     03/19/21  1108 03/20/21  0444 03/21/21  0618   * 138 139   K 3.6 3.5 3.3*    104 105   CO2 25 23 23   BUN 24* 22 18   CREATININE 1.1 1.1 1.1     LFT'S   No results for input(s): AST, ALT, ALB, BILIDIR, BILITOT, ALKPHOS in the last 72 hours. COAG No results for input(s): INR in the last 72 hours.   POC:   Lab Results   Component Value Date    POCGLU 96 03/18/2019    POCGLU 92 08/12/2014     [id number]:  Lab Results   Component Value Date    LABA1C 5.3 03/18/2021 CARDIAC ENZYMES    No results for input(s): CKTOTAL, CKMB, CKMBINDEX, TROPONINI in the last 72 hours. Troponin:   Recent Labs     03/18/21  1703 03/19/21  0011   TROPONINT 0.064* 0.074*     BNP:   No results for input(s): PROBNP in the last 72 hours. U/A:    Lab Results   Component Value Date    COLORU YELLOW 03/18/2021    WBCUA 18 03/18/2021    RBCUA 2 03/18/2021    MUCUS RARE 03/18/2021    BACTERIA NEGATIVE 03/18/2021    CLARITYU HAZY 03/18/2021    SPECGRAV 1.020 03/18/2021    LEUKOCYTESUR LARGE 03/18/2021    BLOODU NEGATIVE 03/18/2021       Ct Head Wo Contrast    Result Date: 3/18/2021  EXAMINATION: CT OF THE HEAD WITHOUT CONTRAST  3/18/2021 12:06 pm TECHNIQUE: CT of the head was performed without the administration of intravenous contrast. Dose modulation, iterative reconstruction, and/or weight based adjustment of the mA/kV was utilized to reduce the radiation dose to as low as reasonably achievable. COMPARISON: 02/27/2014 HISTORY: ORDERING SYSTEM PROVIDED HISTORY: generalized weakness, fall x6 weeks TECHNOLOGIST PROVIDED HISTORY: Has a \"code stroke\" or \"stroke alert\" been called? ->No Reason for exam:->generalized weakness, fall x6 weeks Decision Support Exception->Emergency Medical Condition (MA) Reason for Exam: generalized weakness, fall x6 weeks FINDINGS: BRAIN/VENTRICLES: There is no acute intracranial hemorrhage, mass effect or midline shift. No abnormal extra-axial fluid collection. The gray-white differentiation is maintained without evidence of an acute infarct. There is no evidence of hydrocephalus. Intracranial atherosclerotic calcifications are identified. Patchy periventricular and subcortical white matter low attenuation is identified related to chronic microvascular ischemic change. Generalized age-appropriate cortical atrophy. No intracranial mass is identified. ORBITS: The visualized portion of the orbits demonstrate no acute abnormality.  SINUSES: The visualized paranasal sinuses and mastoid air cells demonstrate no acute abnormality. SOFT TISSUES/SKULL:  No acute abnormality of the visualized skull or soft tissues. Stable appearing CT scan of the head without acute intracranial abnormality. Chronic age-appropriate cortical atrophy as well as microvascular chronic ischemic change. Xr Chest Portable    Result Date: 3/18/2021  EXAMINATION: ONE XRAY VIEW OF THE CHEST 3/18/2021 11:07 am COMPARISON: March 17, 2018 HISTORY: ORDERING SYSTEM PROVIDED HISTORY: chest pain TECHNOLOGIST PROVIDED HISTORY: Reason for exam:->chest pain Reason for Exam: chest pain FINDINGS: Stable cardiomediastinal silhouette. Questionable atelectasis or infiltrate in the left lower lobe. There is no pleural effusion or pneumothorax. Chronic interstitial changes are noted. The osseous structures are stable. Mild left basilar atelectasis or infiltrate. Nm Myocardial Spect Rest Exercise Or Rx    Result Date: 3/19/2021  Cardiac Perfusion Imaging   Demographics   Patient Name      [address] [place] [address]     Date of study        03/19/2021   Date of Birth     1940         Gender               Female   Age               78 year(s)         Race                    Patient Number    [id number]         Room Number          4006   Visit Number      [id number]          Height               63 inches   Corporate ID      [id number]           Weight               179 pounds   Accession Number  [id number]                                        NM Technologist      Ananth Alicea RT   Ordering          Natty Alcala MD        Cardiologist         Saba RG   Conclusions   Summary  Normal tracer uptake in all segments of myocardium on stress ans rest  images. Normal Lexiscan nuclear scintigraphic study suggestive of normal  myocardial perfusion. Gated images demonstrate abnormal left ventricular  systolic function with EF of 35 %.    Recommendation echo to evaluate ef   Signatures   ------------------------------------------------------------------  Electronically signed by Joaquin Watkins MD  (Interpreting cardiologist) on 03/19/2021 at 16:46  ------------------------------------------------------------------  Procedure Procedure Type:   Nuclear Stress Test:Pharmacological, Myocardial Perfusion Imaging with  Pharm, NM MYOCARDIAL SPECT REST EXERCISE OR RX  Indications: Elevated Troponins. Risk Factors   The patient risk factors include:obesity, hypertension and diabetes  mellitus. Stress Protocols   Resting ECG  RBBB. Prolonged QT interval.  Abnormal at Rest.   Resting HR:92 bpm  Resting BP:117/68 mmHg  Stress Protocol:Pharmacologic - Lexiscan  Peak HR:104 bpm              HR/BP product:39903  Peak BP:117/68 mmHg  Predicted HR: 138 bpm  % of predicted HR: 75   Exercise duration: 01:04 min   Symptoms  Nausea. The patient was given an intravenous injection  of Aminophylline to relieve symptoms from  [place]. Complications  Procedure complication was none. Procedure Medications   - Lexiscan I.V. bolus (over 15sec.) 0.4 mg admininstered @ 03/19/2021 12:45.   - Aminophylline I.V. 75 mg admininstered @ 03/19/2021 12:48. Imaging Protocols   Rest                             Stress   Isotope:Sestamibi 99mTc          Isotope: Sestamibi 99mTc  Isotope dose:11 mCi              Isotope dose:33 mCi  Administration route: I.V. Administration route: I.V. Injection Date:03/19/2021 10:35  Injection Date:03/19/2021 12:45  Scan Date:03/19/2021 11:20       Scan Date:03/19/2021 13:30   Technique:        SPECT          Technique:        Gated                                                     SPECT   Procedure Description   Upon patient arrival, the patient is identified using two identifiers and  the physician order is verified.  An IV is established and 8-11mCi of 99mTc  Sestamibi is intravenously injected and followed with 10mL 0.9% Normal  Saline

## 2021-03-21 NOTE — PROGRESS NOTES
Colonoscopy (last one 5+ ys ago ); Tonsillectomy (as a child); and Carpal tunnel release (Right, 3/18/2019). Social History:   reports that she has never smoked. She has never used smokeless tobacco. She reports that she does not drink alcohol or use drugs. Family history:  family history includes Cancer (age of onset: 68) in her brother; Heart Disease in her brother; Other in her brother, father, and son; Stroke in her father. Allergies   Allergen Reactions    Penicillins Rash       Review of Systems:  Review of Systems   Musculoskeletal: Positive for gait problem. All other systems reviewed and are negative. BP (!) 102/58   Pulse 82   Temp 97.9 °F (36.6 °C) (Oral)   Resp 16   Ht 5' 3\" (1.6 m)   Wt 178 lb 9.6 oz (81 kg)   SpO2 97%   BMI 31.64 kg/m²       Intake/Output Summary (Last 24 hours) at 3/21/2021 1139  Last data filed at 3/21/2021 1153  Gross per 24 hour   Intake 240 ml   Output    Net 240 ml       Physical Exam:  Constitutional:  Well developed, Well nourished, No acute distress  HENT:  Normocephalic, Atraumatic, Bilateral external ears normal,  Nose normal.   Neck- trachea is midline  Eyes:  PERRL, Conjunctiva normal  Respiratory:  Normal breath sounds, No respiratory distress, No wheezing, No chest tenderness. Cardiovascular:  Normal heart rate, Normal rhythm, no murmurs appreciated, No rubs appreciated, No gallops appreciated, JVP not elevated  Abdomen/GI:  Soft, No tenderness  Musculoskeletal:  Intact distal pulses, mild edema, No tenderness, No cyanosis, No clubbing. Integument:  Warm, Dry  Lymphatic:  No lymphadenopathy noted.    Neurologic:  Alert & oriented  Psychiatric:  Affect and Mood :pleasant     Medications:    metoprolol succinate  12.5 mg Oral Daily    azithromycin  500 mg Intravenous Q24H    cefTRIAXone (ROCEPHIN) IV  1,000 mg Intravenous Q24H    levothyroxine  100 mcg Oral Daily    vitamin D  2 capsule Oral Daily    sodium chloride flush  10 mL Intravenous 2 times per day    enoxaparin  40 mg Subcutaneous QPM       sodium chloride flush, promethazine **OR** ondansetron, acetaminophen **OR** acetaminophen, polyethylene glycol    Lab Data:  CBC:   Recent Labs     03/19/21  1108 03/20/21  0444 03/21/21  0618   WBC 9.5 8.4 8.7   HGB 12.8 11.4* 11.0*   HCT 41.2 36.1* 33.7*   MCV 89.8 88.3 88.2    169 161     BMP:   Recent Labs     03/19/21  1108 03/20/21  0444 03/21/21  0618   * 138 139   K 3.6 3.5 3.3*    104 105   CO2 25 23 23   BUN 24* 22 18   CREATININE 1.1 1.1 1.1     PT/INR: No results for input(s): PROTIME, INR in the last 72 hours. BNP:    Recent Labs     03/18/21  1215   PROBNP 12,096*     TROPONIN:   Recent Labs     03/18/21  1703 03/19/21  0011   TROPONINT 0.064* 0.074*            NM Myocardial Spect  Normal tracer uptake in all segments of myocardium on stress ans rest    images. Normal Lexiscan nuclear scintigraphic study suggestive of normal    myocardial perfusion. Gated images demonstrate abnormal left ventricular    systolic function with EF of 35 %. Impression:  Active Problems:    Acute cystitis without hematuria    General weakness  Resolved Problems:    * No resolved hospital problems. *       All labs, medications and tests reviewed by myself, continue all other medications of all above medical condition listed as is except for changes mentioned above. Thank you   Please call with questions.     Electronically signed by Yolanda Artis MD on 3/21/2021 at 11:39 AM

## 2021-03-21 NOTE — PLAN OF CARE
Problem: Falls - Risk of:  Goal: Will remain free from falls  Description: Will remain free from falls  3/21/2021 1251 by Sammy Thomas LPN  Outcome: Ongoing  3/21/2021 0439 by Vipin Neely RN  Outcome: Ongoing  Goal: Absence of physical injury  Description: Absence of physical injury  3/21/2021 0439 by Vipin Neely RN  Outcome: Ongoing

## 2021-03-22 ENCOUNTER — TELEPHONE (OUTPATIENT)
Dept: INTERNAL MEDICINE CLINIC | Age: 81
End: 2021-03-22

## 2021-03-22 VITALS
HEART RATE: 84 BPM | RESPIRATION RATE: 18 BRPM | DIASTOLIC BLOOD PRESSURE: 52 MMHG | SYSTOLIC BLOOD PRESSURE: 90 MMHG | OXYGEN SATURATION: 97 % | HEIGHT: 63 IN | WEIGHT: 178.4 LBS | TEMPERATURE: 98.4 F | BODY MASS INDEX: 31.61 KG/M2

## 2021-03-22 LAB
LV EF: 53 %
LVEF MODALITY: NORMAL

## 2021-03-22 PROCEDURE — 6370000000 HC RX 637 (ALT 250 FOR IP): Performed by: NURSE PRACTITIONER

## 2021-03-22 PROCEDURE — G0378 HOSPITAL OBSERVATION PER HR: HCPCS

## 2021-03-22 PROCEDURE — 97535 SELF CARE MNGMENT TRAINING: CPT

## 2021-03-22 PROCEDURE — 97530 THERAPEUTIC ACTIVITIES: CPT

## 2021-03-22 PROCEDURE — 94761 N-INVAS EAR/PLS OXIMETRY MLT: CPT

## 2021-03-22 PROCEDURE — 6370000000 HC RX 637 (ALT 250 FOR IP): Performed by: INTERNAL MEDICINE

## 2021-03-22 PROCEDURE — 99214 OFFICE O/P EST MOD 30 MIN: CPT | Performed by: INTERNAL MEDICINE

## 2021-03-22 PROCEDURE — 93306 TTE W/DOPPLER COMPLETE: CPT

## 2021-03-22 PROCEDURE — 2580000003 HC RX 258: Performed by: NURSE PRACTITIONER

## 2021-03-22 RX ORDER — METOPROLOL SUCCINATE 25 MG/1
12.5 TABLET, EXTENDED RELEASE ORAL DAILY
Qty: 30 TABLET | Refills: 1 | Status: SHIPPED | OUTPATIENT
Start: 2021-03-23 | End: 2021-03-30 | Stop reason: SDUPTHER

## 2021-03-22 RX ORDER — CEFDINIR 300 MG/1
300 CAPSULE ORAL 2 TIMES DAILY
Qty: 8 CAPSULE | Refills: 0 | Status: SHIPPED | OUTPATIENT
Start: 2021-03-22 | End: 2021-03-26

## 2021-03-22 RX ORDER — FUROSEMIDE 20 MG/1
20 TABLET ORAL DAILY
Qty: 30 TABLET | Refills: 0 | Status: SHIPPED | OUTPATIENT
Start: 2021-03-23 | End: 2021-03-30 | Stop reason: SDUPTHER

## 2021-03-22 RX ADMIN — SODIUM CHLORIDE, PRESERVATIVE FREE 10 ML: 5 INJECTION INTRAVENOUS at 09:35

## 2021-03-22 RX ADMIN — Medication 2000 UNITS: at 09:35

## 2021-03-22 RX ADMIN — FUROSEMIDE 20 MG: 20 TABLET ORAL at 09:36

## 2021-03-22 RX ADMIN — LEVOTHYROXINE SODIUM 100 MCG: 100 TABLET ORAL at 09:36

## 2021-03-22 RX ADMIN — METOPROLOL SUCCINATE 12.5 MG: 25 TABLET, EXTENDED RELEASE ORAL at 09:36

## 2021-03-22 ASSESSMENT — PAIN SCALES - GENERAL: PAINLEVEL_OUTOF10: 0

## 2021-03-22 NOTE — PROGRESS NOTES
Bridgett Tsang was evaluated today and a DME order was entered for a wheeled walker because she requires this to successfully complete daily living tasks of bathing, toileting, personal cares, ambulating, grooming and hygiene. A wheeled walker is necessary due to the patient's unsteady gait, upper body weakness, and inability to  an ambulation device; and she can ambulate only by pushing a walker instead of a lesser assistive device such as a cane, crutch, or standard walker. The need for this equipment was discussed with the patient and she understands and is in agreement.

## 2021-03-22 NOTE — PROGRESS NOTES
CLINICAL PHARMACY NOTE: MEDS TO [address] Select Patient?: No  Total # of Prescriptions Filled: 3   The following medications were delivered to the patient:  · Cefdinir 300mg  · Furosemide 20mg  · Metoprolol succinate ER 25mg  Total # of Interventions Completed: 0  Time Spent (min): 30    Additional Documentation:

## 2021-03-22 NOTE — DISCHARGE SUMMARY
Discharge instructions provided to Patient and friend. Patient and friend verbalized understanding with no further questions at this time. Pt discharged to home with [facility] via personal vehicle.  Electronically signed by Kian Membreno LPN on 8/39/4372 at 5:55 PM

## 2021-03-22 NOTE — PLAN OF CARE
Problem: Falls - Risk of:  Goal: Will remain free from falls  Description: Will remain free from falls  3/22/2021 0136 by Domitila Fair RN  Outcome: Ongoing  3/21/2021 1251 by Altagracia Moran LPN  Outcome: Ongoing  Goal: Absence of physical injury  Description: Absence of physical injury  Outcome: Ongoing

## 2021-03-22 NOTE — PROGRESS NOTES
Cardiology Progress Note     Today's Plan: stop IVF, start Toprol XL    Admit Date:  3/18/2021    Consult reason/ Seen today for: elevated troponin    Subjective and  Overnight Events:  Denies any cardiac complaints       Assessment / Plan / Recommendation:     1. HFrEF/ HFpEF ? EF noted to be low on stress test, Echo shows preserved LVEF. Acute on chronic decompensated heart failure/ Resolved. Volume status better controlled. Started Toprol XL 12.5 mg daily,   Started oral Lasix 20 mg daily  2. Possible pneumonia left lower lobe: Continue with antibiotics as per primary  3. HTN: Continue with beta-blocker. 4. Decondition: dehydration, multiple falls. 5. DVT prophylaxis if not contraindicated. Pt can be d/c from cardiology standpoint    outpt follow up    Echocardiogram 3/22/2021       Left ventricular systolic function is normal.   Ejection fraction is visually estimated at 50-55%. Mild left ventricular hypertrophy. Mitral annular calcification with mild mitral stenosis; mean PmmHg. No evidence of any pericardial effusion. Right pleural effusion. History of Presenting Illness:    Chief complain on admission : 78 y. o.year old who is admitted for  Chief Complaint   Patient presents with    Fatigue     decline in health over recent months        Past medical history:    has a past medical history of Family history of abdominal aortic aneurysm, Family history of colon cancer, Fracture of left humerus, Hypertension, Hypothyroidism, postsurgical, Lumbar Spinal Stenosis, Multinodular goiter, VILLANUEVA (nonalcoholic steatohepatitis), Necrobiosis lipoidica diabeticorum ([address] Obesity, Class II, BMI 35-39.9, with comorbidity, Osteoporosis, Polymyalgia rheumatica ([address] RBBB, Type II Diabetes Mellitus, and Urolithiasis.   Past surgical history:   has a past surgical history that includes mane and bso (cervix removed); Cholecystectomy, laparoscopic (1996); Thyroid lobectomy (Left, 1995); orif humerus decompression (Right, 12/2006); Shoulder arthroscopy (Right, ?when); Anus surgery (?when); Knee arthroscopy (Right, 2008); Elbow fracture surgery (Left, 1/2011); Cataract removal with implant (Bilateral, 10+ yrs ago); Colonoscopy (last one 5+ ys ago ); Tonsillectomy (as a child); and Carpal tunnel release (Right, 3/18/2019). Social History:   reports that she has never smoked. She has never used smokeless tobacco. She reports that she does not drink alcohol or use drugs. Family history:  family history includes Cancer (age of onset: 68) in her brother; Heart Disease in her brother; Other in her brother, father, and son; Stroke in her father. Allergies   Allergen Reactions    Penicillins Rash       Review of Systems:  Review of Systems   Musculoskeletal: Positive for gait problem. All other systems reviewed and are negative. BP (!) 90/52   Pulse 84   Temp 98.4 °F (36.9 °C) (Oral)   Resp 18   Ht 5' 3\" (1.6 m)   Wt 178 lb 6.4 oz (80.9 kg)   SpO2 97%   BMI 31.60 kg/m²       Intake/Output Summary (Last 24 hours) at 3/22/2021 1005  Last data filed at 3/22/2021 9060  Gross per 24 hour   Intake 240 ml   Output 150 ml   Net 90 ml       Physical Exam:  Constitutional:  Well developed, Well nourished, No acute distress  HENT:  Normocephalic, Atraumatic, Bilateral external ears normal,  Nose normal.   Neck- trachea is midline  Eyes:  PERRL, Conjunctiva normal  Respiratory:  Normal breath sounds, No respiratory distress, No wheezing, No chest tenderness. Cardiovascular:  Normal heart rate, Normal rhythm, no murmurs appreciated, No rubs appreciated, No gallops appreciated, JVP not elevated  Abdomen/GI:  Soft, No tenderness  Musculoskeletal:  Intact distal pulses, mild edema, No tenderness, No cyanosis, No clubbing. Integument:  Warm, Dry  Lymphatic:  No lymphadenopathy noted.    Neurologic:  Alert & oriented  Psychiatric: Affect and Mood :pleasant     Medications:    furosemide  20 mg Oral Daily    metoprolol succinate  12.5 mg Oral Daily    azithromycin  500 mg Intravenous Q24H    cefTRIAXone (ROCEPHIN) IV  1,000 mg Intravenous Q24H    levothyroxine  100 mcg Oral Daily    vitamin D  2 capsule Oral Daily    sodium chloride flush  10 mL Intravenous 2 times per day    enoxaparin  40 mg Subcutaneous QPM       sodium chloride flush, promethazine **OR** ondansetron, acetaminophen **OR** acetaminophen, polyethylene glycol    Lab Data:  CBC:   Recent Labs     03/19/21  1108 03/20/21  0444 03/21/21  0618   WBC 9.5 8.4 8.7   HGB 12.8 11.4* 11.0*   HCT 41.2 36.1* 33.7*   MCV 89.8 88.3 88.2    169 161     BMP:   Recent Labs     03/19/21  1108 03/20/21  0444 03/21/21  0618   * 138 139   K 3.6 3.5 3.3*    104 105   CO2 25 23 23   BUN 24* 22 18   CREATININE 1.1 1.1 1.1     PT/INR: No results for input(s): PROTIME, INR in the last 72 hours. BNP:    No results for input(s): PROBNP in the last 72 hours. TROPONIN:   No results for input(s): TROPONINT in the last 72 hours. NM Myocardial Spect  Normal tracer uptake in all segments of myocardium on stress ans rest    images. Normal Lexiscan nuclear scintigraphic study suggestive of normal    myocardial perfusion. Gated images demonstrate abnormal left ventricular    systolic function with EF of 35 %. Impression:  Active Problems:    Acute cystitis without hematuria    General weakness  Resolved Problems:    * No resolved hospital problems. *       All labs, medications and tests reviewed by myself, continue all other medications of all above medical condition listed as is except for changes mentioned above. Thank you   Please call with questions.     Electronically signed by Kelvin Rivera MD on 3/22/2021 at 10:05 AM

## 2021-03-22 NOTE — DISCHARGE SUMMARY
Discharge Summary    Name:  Luis Barnett /Age/Sex: 1940  (78 y.o. female)   MRN & CSN:  [id number] & [id number] Admission Date/Time: 3/18/2021 10:53 AM   Attending:  Chanel Tolentino MD Discharging Physician: Dominick Melendez MD     HPI:      Luis Barnett is a 78 y.o.  female  who presents with worsening fatigue that has been going on for the past month. Patient saw her PCP and was scheduled to have vitamin D TSH and CMP today but was unable to complete as she had to come to ED. She does report associated frequency and bilateral upper and lower extremity tingling and numbness. She was diagnosed with type 2 diabetes couple years ago, but was taken off of medication after she lost weight. Patient states she also fell a month ago and broken her ribs and ever since she has not been doing well. Denies fever, chills, chest pain, shortness of breath, cough, abdominal pain, N/V/D. Hx of hypothyroidism, HTN, HLD, and obesity. Hospital Course: Luis Barnett is a 78 y.o.  female  who presents with General weakness    >Slightly elevated troponin/CAD no ACS. Normal stress test.Start BB and Lasix. Due to low EF on stress test , Echo done showed preserved EF, cleared by cardio and was discharged home with [facility] in a stable condition. Po lasix recommended. >Possible left lower lobe pneumonia continue azithromycin/Rocephin.   >Concern for UTI Rocephin should be adequate. Urine cx NTD  - no fever, no leukocytosis, complete po Abx course at home. >Hypothyroidism continue current Synthroid. >Generalized weakness/failure to thrive PT/OT      The patient expressed appropriate understanding of and agreement with the discharge recommendations, medications, and plan.      Consults this admission:  IP CONSULT TO HOSPITALIST  IP CONSULT TO CARDIOLOGY  IP CONSULT TO CASE MANAGEMENT  IP CONSULT TO IV TEAM  IP CONSULT TO HOME CARE NEEDS    Discharge Instruction:   Follow up appointments:     See AVS    Disposition: Discharged to:   []Home, [][facility]  Condition on discharge: Stable    Discharge Medications:      Dilcia Estrella   Home Medication Instructions EPI:[id number]    Printed on:03/22/21 8969   Medication Information                      alendronate (FOSAMAX) 70 MG tablet  Take 1 tablet by mouth every 7 days             cefdinir (OMNICEF) 300 MG capsule  Take 1 capsule by mouth 2 times daily for 4 days             Cholecalciferol (VITAMIN D) 2000 UNITS CAPS capsule  Take 1 capsule by mouth daily. furosemide (LASIX) 20 MG tablet  Take 1 tablet by mouth daily             Handicap Placard MISC  by Does not apply route Good for 5 years             levothyroxine (SYNTHROID) 100 MCG tablet  Take 1 tablet by mouth daily             metoprolol succinate (TOPROL XL) 25 MG extended release tablet  Take 0.5 tablets by mouth daily                 Objective Findings at Discharge:   BP (!) 90/52   Pulse 84   Temp 98.4 °F (36.9 °C) (Oral)   Resp 18   Ht 5' 3\" (1.6 m)   Wt 178 lb 6.4 oz (80.9 kg)   SpO2 97%   BMI 31.60 kg/m²            PHYSICAL EXAM  GEN Awake female, cooperative, no apparent distress. RESP  Symmetric chest movement . CARDIO/VASC  Regular rate. GI Abdomen is soft without significant tenderness,   MSK No gross joint deformities. Spontaneous movement of all extremities  SKIN Normal coloration  NEURO normal speech, no lateralizing weakness. PSYCH Awake, alert, oriented x 4. Affect appropriate.     BMP/CBC  Recent Labs     03/20/21  0444 03/21/21  0618    139   K 3.5 3.3*    105   CO2 23 23   BUN 22 18   CREATININE 1.1 1.1   WBC 8.4 8.7   HCT 36.1* 33.7*    161       IMAGING:  Echocardiogram Complete 2d With Doppler With Color    Result Date: 3/22/2021  Transthoracic Echocardiography Report (TTE)  Demographics   Patient Name       Jaz Larios     Date of Study       03/22/2021   Date of Birth      1940         Gender              Female   Age Pericardial Effusion  No evidence of any pericardial effusion. Pleural Effusion  Right pleural effusion. Miscellaneous  IVC and abdominal aorta are within normal limits.   M-Mode/2D Measurements & Calculations   LV Diastolic Dimension:  LV Systolic Dimension:  LA Dimension: 3.1 cmAO Root  3.3 cm                   2.59 cm                 Dimension: 3.3 cmLA Area:  LV FS:21.5 %             LV Volume Diastolic: 61 49.3 cm2  LV PW Diastolic: 9.65 cm ml  LV PW Systolic: 3.59 cm  LV Volume Systolic: 27  Septum Diastolic: 6.85   ml  cm                       LV EDV/LV EDV Index: 61 RV Diastolic Dimension:  Septum Systolic: 9.19 cm EI/34 W6SZ ESV/LV ESV   3.51 cm  CO: 3.83 l/min           Index: 27 ml/15 m2  CI: 2.08 l/m*m2          EF Calculated (A4C):    LA/Aorta: 0.94                           55.7 %  LV Area Diastolic: 24    EF Calculated (2D):     LA volume/Index: 43 ml  cm2                      44.7 %                  /99C5  LV Area Systolic: 80.9  cm2                      LV Length: 8.02 cm                            LVOT: 1.9 cm  Doppler Measurements & Calculations   MV Peak E-Wave: 110 cm/s  AV Peak Velocity: 137 cm/s   LVOT Peak Velocity:  MV Peak A-Wave: 133 cm/s  AV Peak Gradient: 7.51 mmHg  82 cm/s  MV E/A Ratio: 0.83        AV Mean Velocity: 98.1 cm/s  LVOT Mean Velocity:  MV Peak Gradient: 4.84    AV Mean Gradient: 4 mmHg     70.4 cm/s  mmHg                      AV VTI: 20.5 cm              LVOT Peak Gradient: 3  MV Mean Gradient: 4 mmHg  AV Area (Continuity):2.25    mmHgLVOT Mean  MV Mean Velocity: 94.4    cm2                          Gradient: 2 mmHg  cm/s  MV P1/2t: 69 msec         LVOT VTI: 16.3 cm  MVA by PHT:3.19 cm2  MV Area (continuity):  1.39 cm2  MV E' Septal Velocity:  4.72 cm/s  MV E' Lateral Velocity:  4.28 cm/s  MV E/E' septal: 23.31  MV E/E' lateral: 25.7      Ct Head Wo Contrast    Result Date: 3/18/2021  EXAMINATION: CT OF THE HEAD WITHOUT CONTRAST  3/18/2021 12:06 pm TECHNIQUE: CT of the head was performed without the administration of intravenous contrast. Dose modulation, iterative reconstruction, and/or weight based adjustment of the mA/kV was utilized to reduce the radiation dose to as low as reasonably achievable. COMPARISON: 02/27/2014 HISTORY: ORDERING SYSTEM PROVIDED HISTORY: generalized weakness, fall x6 weeks TECHNOLOGIST PROVIDED HISTORY: Has a \"code stroke\" or \"stroke alert\" been called? ->No Reason for exam:->generalized weakness, fall x6 weeks Decision Support Exception->Emergency Medical Condition (MA) Reason for Exam: generalized weakness, fall x6 weeks FINDINGS: BRAIN/VENTRICLES: There is no acute intracranial hemorrhage, mass effect or midline shift. No abnormal extra-axial fluid collection. The gray-white differentiation is maintained without evidence of an acute infarct. There is no evidence of hydrocephalus. Intracranial atherosclerotic calcifications are identified. Patchy periventricular and subcortical white matter low attenuation is identified related to chronic microvascular ischemic change. Generalized age-appropriate cortical atrophy. No intracranial mass is identified. ORBITS: The visualized portion of the orbits demonstrate no acute abnormality. SINUSES: The visualized paranasal sinuses and mastoid air cells demonstrate no acute abnormality. SOFT TISSUES/SKULL:  No acute abnormality of the visualized skull or soft tissues. Stable appearing CT scan of the head without acute intracranial abnormality. Chronic age-appropriate cortical atrophy as well as microvascular chronic ischemic change. Xr Chest Portable    Result Date: 3/18/2021  EXAMINATION: ONE XRAY VIEW OF THE CHEST 3/18/2021 11:07 am COMPARISON: March 17, 2018 HISTORY: ORDERING SYSTEM PROVIDED HISTORY: chest pain TECHNOLOGIST PROVIDED HISTORY: Reason for exam:->chest pain Reason for Exam: chest pain FINDINGS: Stable cardiomediastinal silhouette.   Questionable atelectasis or infiltrate in the left lower Protocol:Pharmacologic - Lexiscan  Peak HR:104 bpm              HR/BP product:57340  Peak BP:117/68 mmHg  Predicted HR: 138 bpm  % of predicted HR: 75   Exercise duration: 01:04 min   Symptoms  Nausea. The patient was given an intravenous injection  of Aminophylline to relieve symptoms from   Banana. Complications  Procedure complication was none. Procedure Medications   - Lexiscan I.V. bolus (over 15sec.) 0.4 mg admininstered @ 03/19/2021 12:45.   - Aminophylline I.V. 75 mg admininstered @ 03/19/2021 12:48. Imaging Protocols   Rest                             Stress   Isotope:Sestamibi 99mTc          Isotope: Sestamibi 99mTc  Isotope dose:11 mCi              Isotope dose:33 mCi  Administration route: I.V. Administration route: I.V. Injection Date:03/19/2021 10:35  Injection Date:03/19/2021 12:45  Scan Date:03/19/2021 11:20       Scan Date:03/19/2021 13:30   Technique:        SPECT          Technique:        Gated                                                     SPECT   Procedure Description   Upon patient arrival, the patient is identified using two identifiers and  the physician order is verified. An IV is established and 8-11mCi of 99mTc  Sestamibi is intravenously injected and followed with 10mL 0.9% Normal  Saline flush. A circulation period of 45 minutes occurs prior to resting  SPECT imaging. After imaging is complete the patient is escorted to the  stress lab. The patient is connected to the ECG and blood pressure is  measured. The RN starts the stress portion of the exam and rapidly  intravenously injects Lexiscan (regadenosine) 0.4mg over a period of 10 to15  seconds and follows with 5mL 0.9% Normal Saline flush. Immediately following  the Nuclear Technologist intravenously injects 22-33mCi of 99mTc Sestamibi  and 5mL 0.9% Normal Saline flush. After completion, recovery, and removal of  the IV, the patient rests during the second circulation period of 45  minutes.  Final stress SPECT gated imaging is performed. The patient may  return home or to their room after stress imaging. The images are processed  and final charting is completed and sent to the appropriate cardiologist for  interpretation and reporting. Perfusion Interpretation   Normal tracer uptake in all segments of myocardium on stress ans rest  images. Imaging Results    Summed scores     - Summed stress score: 7     - Summed rest score: 4     - Summed difference score:    3   Rest ejection  Ejection fraction:36 %  EDV :66 ml  ESV :42 ml  Stroke volume :24 ml  Medical History   Accession#:  [id number]  Admission Data Admission date: 03/18/2021 Admission Time: 10:53 Hospital Status: Outpatient.       Discharge Time of 35 minutes    Electronically signed by Loyda Chávez MD on 3/22/2021 at 1:34 PM

## 2021-03-22 NOTE — CARE COORDINATION
REBEKAH notified by OT that Pt has a standard walker that she borrowed from someone else. Pt really needs a rolling walker. TERESAW sent PS to [facility] asking for a rolling walker and the supporting documentation. TERESAW sent referral to [facility]. They will not be able to deliver the walker until tomorrow. TERESAW informed the Pt.

## 2021-03-22 NOTE — PROGRESS NOTES
Occupational Therapy  . Occupational Therapy Treatment Note  Name: Tima Hurtado MRN: [id number] :   1940   Date:  3/22/2021   Admission Date: 3/18/2021 Room:  [address] [place] [address]   Restrictions/Precautions:  Restrictions/Precautions  Restrictions/Precautions: General Precautions, Fall Risk (low)    Communication with other providers:  Per chart review and Nurse Patrick Brewer, patient is appropriate for therapeutic intervention. Notified  Lucinda Snow of clarification regarding pt's not having a RW as noted on OT/PT evaluations but has a used standard walker.  initiating getting RW for pt. Subjective:  Patient states:  \"I hope I'm going home today. \" and also reports need to have a BM. Pt also reports that the walker at home is not a rolling walker but \"one without any wheels, just four legs\" (describes standard walker) and that it is not patient's personal walker but was just there for use. Pain:   Location, Type, Intensity (0/10 to 10/10):  0/10, denies pain    Objective:    Observation:  Pt received seated in bedside chair. Objective Measures:  LUE saline lock    Treatment, including education:  Therapeutic Activity Training:   Therapeutic activity training was instructed today. Cues were given for safety, sequence, UE/LE placement, awareness, and balance. Activities performed today included transfer training for sit-stand, SPT. Sit<>stands: SBA c RW + occasional cue for safe body positioning, especially hand placement during stand to sits. Standing balance / tolerance: SBA c RW, tolerated two stands, 3 and 5 minutes each. Functional Mobility: Initially CGA c RW, progressing to SBA ~15 ft inside room x2 trials. Self Care Training:   Cues were given for safety, sequence, UE/LE placement, visual cues, and balance. Activities performed today included toileting, hand hygiene, and grooming.   Toilet Transfer: SBA c RW + occasional cue for safe body positioning  Toileting: SBA for clothing mgmt up / down and hygiene s/p urination. Pt was not successful for BM. Grooming: SBA c RW in stance at sink to include hand hygiene and hair care. All therapeutic intervention performed c emphasis on toileting and grooming tasks, dynamic balance / standing tolerance to inc strength, endurance and act tolerance for inc Indep c ADL tasks, func transfers / mobility. Safety  Patient safely in bedside chair + alarm placed at end of session, with call light/phone in reach, and nursing aware. Gait belt was used for func transfers / mobility. Assessment / Impression:        Patient's tolerance of treatment:  Well   Adverse Reaction: None  Significant change in status and impact:  Progressing mobility  Barriers to improvement:  Endurance / balance    Plan for Next Session:    Continue per OT POC per patient's tolerance. Time in:  1328  Time out:  1400  Timed treatment minutes:  32  Total treatment time:  32    Electronically signed by:    ZONIA Vann  3/22/2021, 1:24 PM    Previously filed values:    Goals:  1. Pt will complete all aspects of bed mobility for EOB/OOB ADLs mod I with HOB flat  2. Pt will complete UB/LB bathing min A with setup using long handled sponge  3. Pt will complete all aspects of LB dressing min A with setup using LB AE  4. Pt will complete all functional transfers to and from bed, chair, toilet, shower chair mod I with use of grab bars PRN  5. Pt will ambulate HH distance to bathroom for toileting mod I with RW  6. Pt will complete all aspects of toileting task independently  7. Pt will complete oral hygiene/grooming routine in standing at sink independently   8.  Pt will complete ther ex/ther act with focus on UE strengthening, functional standing tolerance >10 minutes, dynamic standing balance for ADL/IADL tasks

## 2021-03-22 NOTE — PROGRESS NOTES
[facility] Liaison spoke with pt and is aware of discharge & will initiate Alok Ludwig.

## 2021-03-23 ENCOUNTER — CARE COORDINATION (OUTPATIENT)
Dept: CASE MANAGEMENT | Age: 81
End: 2021-03-23

## 2021-03-23 NOTE — CARE COORDINATION
[facility] Transitions Initial Follow Up Call    Call within 2 business days of discharge: Yes    Patient: Luis Barnett Patient : 1940   MRN: [id number]  Reason for Admission: Possible LLL Pna, Concern for UTI, Gen Weakness  Discharge Date: 3/22/21 RARS: No data recorded  Facility: [address] [place] [address]       Complaint Diagnosis Description Type Department Provider    3/18/21 Fatigue General weakness . .. ED to Hosp-Admission (Discharged) (ADMITTED) Yessica Bear MD; Iqra Mulligan, ...       Non-face-to-face services provided:  Obtained and reviewed discharge summary and/or continuity of care documents  Education of patient/family/caregiver/guardian to support self-management-Pna, UTI, Covid19  Assessment and support for treatment adherence and medication management-Omnicef, Lasix, Toprol    Care Transitions 24 Hour Call    Schedule Follow Up Appointment with PCP: Declined  Do you have any ongoing symptoms?: Yes  Patient-reported symptoms: Weakness, Shortness of Breath  Interventions for patient-reported symptoms: Other (Comment: Patient denies need)  Do you have a copy of your discharge instructions?: Yes  Do you have all of your prescriptions and are they filled?: Yes  Have you been contacted by a [address]?: No  Have you scheduled your follow up appointment?: Yes  How are you going to get to your appointment?: Car - family or friend to transport  Were you discharged with any Home Care or Post Acute Services: Yes  Post Acute Services: Home Health (Comment: [address])  Do you feel like you have everything you need to keep you well at home?: Yes  Care Transitions Interventions   Home Care Waiver: Completed      Meals on Wheels: Declined  DME Assistance: Declined     Senior Services: Declined         Follow Up  Future Appointments   Date Time Provider Francisco Javier Pulliam   3/30/2021  4:00 PM MD Neha Beal   2021 11:00 AM MD Pedro Beal [facility]   . Was this an external facility discharge? No    Challenges to be reviewed by the provider   Additional needs identified to be addressed with provider: No       Method of communication with provider : [address]: 3/18/21 Negative  PATIENT RISK FACTORS:  Age, DM  RARS: Low risk  [facility] PCP: Yarely Nelson    Was this a readmission? No  Patient stated reason for admission: Weakness, fatigue  Patients top risk factors for readmission: functional physical ability, medical condition, medication management and utilization of services    Care Transition Nurse (CTN) contacted patient by telephone to perform post hospital discharge assessment. Verified name and  with patient as identifiers. Provided introduction to self, and explanation of the CTN role. Patient reports ongoing generalized weakness, fatigue and sob on exertion. Reports sx slightly improved from presentation to ED. Denies ac resp distress, cough, fever, chills. Reviewed Pna Zone Mgt, verbalizes understanding. Noted to be speaking in complete, unlabored sentences. Reports voiding qs; denies any urinary complaints, sx. Advised water intake, adequate nutrition, rest, pacing activity, using assistive devise w/ ambulation. Confirmed wheeled walker was delivered this morning, Patient using. Reviewed fall safety. Confirmed copy of AVS received, reviewed w/ Patient. Obtained and taking Omnicef, Lasix, Toprol as directed. Has stopped Hyzaar as directed. Med education provided. Stressed importance of not skipping Omnicef and completing entire course unless otherwise directed by MD. Sadie Mak obtaining 90 day supply of routine, prn meds. [address] [place] [address] Rd services initiated per Conrad Izquierdo RN Liaison. Patient awaiting call for intake scheduling. Discussed benefits of 24/ RN coverage. Denies need for additional in home support, dme. Patient lives w/ Brother. DIL/CELSO check in,  assist Patient as needed.  Reports DIL assisted Patient w/ shower this morning. Does ask about MOW. Discussed benefits of [facility] for MOW, in home support, aide services, grocery shopping, transportation. Politely declined referral stating she will have CELSO do so. Encouraged to contact CTN if needing assistance. Patient aware of 3/30/21 PCP appt, family to provide transportation. Advised to contact PCP/[facility] 24/7 re: any health concerns for early outpt intervention. Reviewed the following, verbalizes understanding, denies Covid19 sx :  From CDC: Are you at higher risk for severe illness?  Wash your hands often.  Avoid close contact (6 feet, which is about two arm lengths) with people who are sick.  Put distance between yourself and other people if COVID-19 is spreading in your community.  Clean and disinfect frequently touched surfaces.  Wear mask covering nose and mouth when unable to social distance   Call your healthcare professional if you have concerns about COVID-19 and your underlying condition or if you are sick. Patient has received Covid19 vaccine 1st step however was hospitalized when she was scheduled to have 2nd step. Patient plans to ok w/ PCP prior to re-scheduling. Advance Care Planning   Healthcare Decision Maker:    Primary Decision Maker: Jorden Pickett - Child - [phone]  Reports she has Living Will, Advanced Directives. Encouraged to place on file w/ PCP, My Chart. Patient given information for [employer] and agrees to enroll. Patient's preferred e-mail: [email or web address]   Patient's preferred phone number: [phone]   Based on Loop alert triggers, patient will be contacted by nurse care manager for worsening symptoms. Note routed to [employer] for enrollment.      Amber Bennett RN

## 2021-03-26 ENCOUNTER — HOSPITAL ENCOUNTER (OUTPATIENT)
Age: 81
Setting detail: SPECIMEN
Discharge: HOME | End: 2021-03-26
Payer: COMMERCIAL

## 2021-03-26 LAB
ANION GAP SERPL CALCULATED.3IONS-SCNC: 13 MMOL/L (ref 4–16)
BUN BLDV-MCNC: 16 MG/DL (ref 6–23)
CALCIUM SERPL-MCNC: 8.2 MG/DL (ref 8.3–10.6)
CHLORIDE BLD-SCNC: 99 MMOL/L (ref 99–110)
CO2: 25 MMOL/L (ref 21–32)
CREAT SERPL-MCNC: 1.1 MG/DL (ref 0.6–1.1)
GFR AFRICAN AMERICAN: 58 ML/MIN/1.73M2
GFR NON-AFRICAN AMERICAN: 48 ML/MIN/1.73M2
GLUCOSE BLD-MCNC: 111 MG/DL (ref 70–99)
POTASSIUM SERPL-SCNC: 3.4 MMOL/L (ref 3.5–5.1)
SODIUM BLD-SCNC: 137 MMOL/L (ref 135–145)

## 2021-03-26 PROCEDURE — 80048 BASIC METABOLIC PNL TOTAL CA: CPT

## 2021-03-30 ENCOUNTER — OFFICE VISIT (OUTPATIENT)
Dept: INTERNAL MEDICINE CLINIC | Age: 81
End: 2021-03-30
Payer: COMMERCIAL

## 2021-03-30 VITALS
BODY MASS INDEX: 31.89 KG/M2 | WEIGHT: 180 LBS | SYSTOLIC BLOOD PRESSURE: 120 MMHG | TEMPERATURE: 97.5 F | HEART RATE: 91 BPM | DIASTOLIC BLOOD PRESSURE: 72 MMHG | OXYGEN SATURATION: 98 %

## 2021-03-30 DIAGNOSIS — I10 ESSENTIAL HYPERTENSION: ICD-10-CM

## 2021-03-30 DIAGNOSIS — R53.1 WEAKNESS: Primary | ICD-10-CM

## 2021-03-30 DIAGNOSIS — Z82.49 FAMILY HISTORY OF ABDOMINAL AORTIC ANEURYSM: ICD-10-CM

## 2021-03-30 PROCEDURE — 99214 OFFICE O/P EST MOD 30 MIN: CPT | Performed by: FAMILY MEDICINE

## 2021-03-30 PROCEDURE — 1111F DSCHRG MED/CURRENT MED MERGE: CPT | Performed by: FAMILY MEDICINE

## 2021-03-30 RX ORDER — FUROSEMIDE 20 MG/1
20 TABLET ORAL DAILY
Qty: 30 TABLET | Refills: 3 | Status: ON HOLD
Start: 2021-03-30 | End: 2021-06-05 | Stop reason: HOSPADM

## 2021-03-30 RX ORDER — METOPROLOL SUCCINATE 25 MG/1
12.5 TABLET, EXTENDED RELEASE ORAL DAILY
Qty: 30 TABLET | Refills: 5 | Status: SHIPPED | OUTPATIENT
Start: 2021-03-30

## 2021-03-30 ASSESSMENT — ENCOUNTER SYMPTOMS
VOMITING: 0
CHEST TIGHTNESS: 0
SORE THROAT: 0
DIARRHEA: 0
CONSTIPATION: 0
SHORTNESS OF BREATH: 0
COLOR CHANGE: 0
ABDOMINAL PAIN: 0

## 2021-03-30 NOTE — PATIENT INSTRUCTIONS
Start wearing compressions stockings and keep legs elevated as able. You may take 2 tabs of lasix if the swelling in your legs get worse. Make sure to call the cardiology office to schedule an appointment.

## 2021-03-30 NOTE — PROGRESS NOTES
Subjective:      Chief Complaint   Patient presents with    Follow-Up from [facility] Swelling     in ankles and legs       HPI:  Jung Loaiza is a 78 y.o. female who presents today for hospital follow up. Was discharged from the hospital about a week ago. Was hospitalized for generalized weakness, states they never determined the cause. Had a stress test and an ECHO which were normal.  Was also treated for UTI but urine culture was negative. Patient denies ever having urinary symptoms. Was also treated for \"possible PNA. \"  Hyzaar was discontinued and patient was started on metoprolol and lasix upon discharge. States she was instructed to follow up with cardiology as an outpatient, but has not scheduled yet. Still having some swelling in her ankles bilaterally, but is better than what it was in the hospital.  Is not wearing compression stockings, but is trying to keep them elevated. Also still with some SOB on exertion. PT/OT as well as home health has been coming to her home regularly. Sister in law states both of patient's parents as well as her brother has history of AAA. Screening US was ordered several years ago, but was never completed.        Past Medical History:   Diagnosis Date    Family history of abdominal aortic aneurysm     CT Abd Aorta 10/2007 - normal aorta    Family history of colon cancer     Colonoscopy due 1/2013    Fracture of left humerus 3/2014    Carozza    Hypertension     Hypothyroidism, postsurgical 1997    Left Thyroid Lobectomy    Lumbar Spinal Stenosis 2006    severe    Multinodular goiter 1996    suppresion and bx negative; s/p thyroid lobectomy    CHAUDHRY (nonalcoholic steatohepatitis) 2005    chaudhry vs cirrhosis;  Dr. Sis Hung Necrobiosis lipoidica diabeticorum ([facility] [address]     Obesity, Class II, BMI 35-39.9, with comorbidity     Osteoporosis 1/2015    FRAX 5.5 & 17%    Polymyalgia rheumatica ([facility] [address] 1/2012    Dr Cande Campo R; released her 3/2014    RBBB 02/2017    new finding    Type II Diabetes Mellitus 1994    per pt on 3/14/2019\"they do not consider me diabetic anymore- took me off medication long time ago\"    Urolithiasis     once        Past Surgical History:   Procedure Laterality Date    ANUS SURGERY  ? when    Anal Fissure Repair    CARPAL TUNNEL RELEASE Right 3/18/2019    RIGHT CARPAL TUNNEL RELEASE performed by Parminder Ha MD at [place] [id number] Bilateral 10+ yrs ago    CHOLECYSTECTOMY, [address]  last one 5+ ys ago     ELBOW FRACTURE SURGERY Left 1/2011    ORIF    KNEE ARTHROSCOPY Right 2008    ORIF HUMERUS DECOMPRESSION Right 12/2006    right humeral fracture    SHOULDER ARTHROSCOPY Right ?when    JANA AND BSO      \"sometime in my 50's\"    THYROID LOBECTOMY Left 1995    TONSILLECTOMY  as a child       Social History     Tobacco Use    Smoking status: Never Smoker    Smokeless tobacco: Never Used   Substance Use Topics    Alcohol use: No        Review of Systems   Constitutional: Negative for activity change, appetite change, chills, fever and unexpected weight change. HENT: Negative for congestion and sore throat. Respiratory: Negative for chest tightness and shortness of breath. Cardiovascular: Positive for leg swelling ( ). Negative for chest pain and palpitations. Gastrointestinal: Negative for abdominal pain, constipation, diarrhea and vomiting. Genitourinary: Negative for dysuria. Skin: Negative for color change. Neurological: Negative for dizziness and light-headedness. Psychiatric/Behavioral: Negative for dysphoric mood. The patient is not nervous/anxious. Prior to Visit Medications    Medication Sig Taking?  Authorizing Provider   metoprolol succinate (TOPROL XL) 25 MG extended release tablet Take 0.5 tablets by mouth daily Yes Chanel Tolentino MD   furosemide (LASIX) 20 MG tablet Take 1 tablet by mouth daily Yes Chanel Tolentino MD   alendronate (FOSAMAX) 70 MG tablet Take 1 tablet by mouth every 7 days  Patient taking differently: Take 70 mg by mouth every 7 days Mondays Yes Sunny Hammond MD   Handicap Placard MISC by Does not apply route Good for 5 years Yes Sunny Hammond MD   levothyroxine (SYNTHROID) 100 MCG tablet Take 1 tablet by mouth daily Yes Sunny Hammond MD   Cholecalciferol (VITAMIN D) 2000 UNITS CAPS capsule Take 1 capsule by mouth daily. Yes Historical Provider, MD          Objective:      /72   Pulse 91   Temp 97.5 °F (36.4 °C)   Wt 180 lb (81.6 kg)   SpO2 98%   BMI 31.89 kg/m²      Physical Exam  Vitals signs and nursing note reviewed. Constitutional:       General: She is not in acute distress. Appearance: Normal appearance. She is not ill-appearing or toxic-appearing. HENT:      Head: Normocephalic and atraumatic. Right Ear: External ear normal.      Left Ear: External ear normal.      Nose: Nose normal.      Mouth/Throat:      Pharynx: Oropharynx is clear. Eyes:      General: No scleral icterus. Right eye: No discharge. Left eye: No discharge. Extraocular Movements: Extraocular movements intact. Conjunctiva/sclera: Conjunctivae normal.   Neck:      Musculoskeletal: Normal range of motion and neck supple. No neck rigidity. Cardiovascular:      Rate and Rhythm: Normal rate and regular rhythm. Heart sounds: Normal heart sounds. Pulmonary:      Effort: Pulmonary effort is normal.      Breath sounds: Normal breath sounds. No wheezing or rales. Musculoskeletal:         General: Swelling (mild 1+ pitting edema in ankles to mid calf bilaterally) present. No deformity. Skin:     General: Skin is warm and dry. Findings: No rash. Neurological:      General: No focal deficit present. Mental Status: She is alert. Mental status is at baseline. Motor: No weakness.    Psychiatric:         Mood and Affect: Mood normal.         Behavior: Behavior normal. Assessment / Plan:      1. Weakness  Recently discharged from hospital for generalized weakness, started on metoprolol and lasix per cardiology. Lower extremity swelling improving and mild on exam today, but advised to increase lasix to 2 tabs daily if symptoms worsen and contact clinic. Recommended compression stockings as well. Advised to follow up with cardiology as instructed. PT/OT providing therapy at home. Will repeat labs in a few weeks before follow up to monitor K/creatinine.  - metoprolol succinate (TOPROL XL) 25 MG extended release tablet; Take 0.5 tablets by mouth daily  Dispense: 30 tablet; Refill: 5  - furosemide (LASIX) 20 MG tablet; Take 1 tablet by mouth daily  Dispense: 30 tablet; Refill: 3  - OK DISCHARGE MEDS RECONCILED W/ CURRENT OUTPATIENT MED LIST    2. Family history of abdominal aortic aneurysm  Strong family history of AAA, previously ordered US never completed. - US ABDOMINAL AORTA LIMITED; Future    3. Essential hypertension  Stable, well controlled. Continue current medications. - metoprolol succinate (TOPROL XL) 25 MG extended release tablet; Take 0.5 tablets by mouth daily  Dispense: 30 tablet; Refill: 5  - CBC Auto Differential; Future  - Comprehensive Metabolic Panel; Future          Patient voiced understanding and agreement with plan. All questions/concerns were addressed, risks/side effects of medications were reviewed. Return precautions and after visit summary were provided. Return in about 4 weeks (around 4/27/2021). or earlier as needed.       Marisel Carter MD

## 2021-04-02 ENCOUNTER — OFFICE VISIT (OUTPATIENT)
Dept: CARDIOLOGY CLINIC | Age: 81
End: 2021-04-02
Payer: COMMERCIAL

## 2021-04-02 VITALS
SYSTOLIC BLOOD PRESSURE: 104 MMHG | DIASTOLIC BLOOD PRESSURE: 60 MMHG | HEIGHT: 62 IN | WEIGHT: 178.8 LBS | BODY MASS INDEX: 32.9 KG/M2 | HEART RATE: 88 BPM

## 2021-04-02 DIAGNOSIS — I50.32 CHRONIC HEART FAILURE WITH PRESERVED EJECTION FRACTION (HCC): ICD-10-CM

## 2021-04-02 PROCEDURE — 99214 OFFICE O/P EST MOD 30 MIN: CPT | Performed by: NURSE PRACTITIONER

## 2021-04-02 ASSESSMENT — ENCOUNTER SYMPTOMS
ORTHOPNEA: 0
SHORTNESS OF BREATH: 1

## 2021-04-02 NOTE — PATIENT INSTRUCTIONS
**It is YOUR responsibilty to bring medication bottles and/or updated medication list to [address] [place] [address]. This will allow us to better serve you and all your healthcare needs**      Please be informed that if you contact our office outside of normal business hours the physician on call cannot help with any scheduling or rescheduling issues, procedure instruction questions or any type of medication issue. We advise you for any urgent/emergency that you go to the nearest emergency room!     PLEASE CALL OUR OFFICE DURING NORMAL BUSINESS HOURS    Monday - Friday   8 am to 5 pm    [place]: [phone]    [place]:  [phone]

## 2021-04-02 NOTE — PROGRESS NOTES
4/2/2021  Primary cardiologist: Dr. Mame Velasquez  is an established 78 y.o.  female here for follow-up on recent hospital       SUBJECTIVE/OBJECTIVE:  HPI : Jose M Garay is an 78-year-old female with a past medical history hypertension and diabetes. She was recently seen as a consult in [address] [place] [address] on March 18, 2021 for complaints of elevated troponin and elevated BNP. BNP 12,096 /peak troponin  0.074 EKG showed nonspecific ST changes. She underwent noninvasive testing with stress Cardiolite that showed normal perfusion. Her EF was noted to be 35% she underwent echocardiogram to reevaluate EF. Echocardiogram showed EF 50 to 55% with mild LVH. Jose M Garay reports she is feeling better. She denies any chest pain she notes she does have some shortness of breath however states is not new nor increased. There is no orthopnea or PND. Her activity is limited. She is in a wheelchair today. Review of Systems   Constitution: Negative for diaphoresis and malaise/fatigue. Cardiovascular: Negative for chest pain, claudication, dyspnea on exertion, irregular heartbeat, leg swelling, near-syncope, orthopnea, palpitations and paroxysmal nocturnal dyspnea. Respiratory: Positive for shortness of breath. Neurological: Negative for dizziness and light-headedness. Vitals:    04/02/21 1522   BP: 104/60   Site: Left Upper Arm   Position: Sitting   Cuff Size: Medium Adult   Pulse: 88   Weight: 178 lb 12.8 oz (81.1 kg)   Height: 5' 2\" (1.575 m)     /60 (Site: Left Upper Arm, Position: Sitting, Cuff Size: Medium Adult)   Pulse 88   Ht 5' 2\" (1.575 m)   Wt 178 lb 12.8 oz (81.1 kg)   BMI 32.70 kg/m²   No flowsheet data found. Wt Readings from Last 3 Encounters:   04/02/21 178 lb 12.8 oz (81.1 kg)   03/30/21 180 lb (81.6 kg)   03/22/21 178 lb 6.4 oz (80.9 kg)     Body mass index is 32.7 kg/m².     Physical Exam :     Neck: supple,  no carotid bruit appreciated, JVD not appreciated    Respiratory:  Normal breath sounds, No respiratory distress, No wheezing    Cardiovascular:  S1 S2 appreciated, Regular rate, regular rhythm,  no murmur appreciated, No rubs appreciated, No gallops appreciated, No chest tenderness. Extremities:  no edema to the lower extremities    All pertinent data reviewed and discussed with patient including:    Transthoracic echocardiogram : 2021  Left ventricular systolic function is normal.   Ejection fraction is visually estimated at 50-55%. Mild left ventricular hypertrophy. Mitral annular calcification with mild mitral stenosis; mean PmmHg. No evidence of any pericardial effusion. Right pleural effusion. NM spect: 2021  Normal tracer uptake in all segments of myocardium on stress ans rest    images. Normal Lexiscan nuclear scintigraphic study suggestive of normal    myocardial perfusion. Gated images demonstrate abnormal left ventricular    systolic function with EF of 35 %. ASSESSMENT/PLAN:    HFpEF  Appears to be well compensated/resolved  Has mild shortness of breath however does not appear to be secondary to volume overload. Chest is clear respirations even nonlabored there is no swelling to lower extremities she has no abdominal bloating. Recommend to continue with low-dose Lasix. Encourage patient to monitor weight at home. Continue with low-dose Toprol    Hypertension  Her blood pressure is controlled         Medications reviewed and confirmed with patient     Tests ordered:  none    OV in 3 months     Signed:  Elijah Norman, 2021, 3:35 PM    An electronic signature was used to authenticate this note.

## 2021-04-02 NOTE — LETTER
Mark King  1940  [id number]    Have you had any Chest Pain that is not new? - No       Have you had any Shortness of Breath - Yes  If Yes - When on exertion    Have you had any dizziness - No       Have you had any palpitations that are not new? - No     Is the patient on any of the following medications - NONE  If Yes DO EKG - Needs done every 6 months    Do you have any edema - swelling in ankles    If Yes - CHECK TO SEE IF THE EDEMA IS PITTING  How long have they been having edema - Day's  If Yes - Have they worn compression stockings Yes  If they have worn compression stockings not long not long    Vein \"LEG PROBLEM Questionnaire\"  1. Do you have prominent leg veins? Yes   2. Do you have any skin discoloration? No  3. Do you have any healed or active sores? No  4. Do you have swelling of the legs? Yes  5. Do you have a family history of varicose veins? Yes, paternal aunt  10. Does your profession involve pro-longed        standing or heavy lifting? No  7. Have you been fighting overweight problems? Yes most of life  8. Do you have restless legs? Yes  9. Do you have any night time cramps? No  10. Do you have any of the following in your legs:        Heaviness    When did you have your last labs drawn 3/2021  Where did you have them done [facility]    If we do not have these labs you are retrieve these labs for these providers!     Do you have a surgery or procedure scheduled in the near future - No   If Yes- DO EKG   [facility] patient [facility] if [facility] are not already [facility]     Check to see if we have an E-MAIL on file for the patient     Check medication list thoroughly!!! AND RECONCILE OUTSIDE MEDICATIONS  If dose has changed change the entire order not just the MG  BE SURE TO ASK PATIENT IF THEY NEED MEDICATION REFILLS     At check out add to every patient's \"wrap up\" the following dot phrase AFTERHOURSEDUCATION and ensure we explain this to our patients

## 2021-04-09 ENCOUNTER — HOSPITAL ENCOUNTER (OUTPATIENT)
Age: 81
Discharge: HOME | End: 2021-04-09
Payer: COMMERCIAL

## 2021-04-09 ENCOUNTER — HOSPITAL ENCOUNTER (OUTPATIENT)
Dept: ULTRASOUND IMAGING | Age: 81
Discharge: HOME | End: 2021-04-09
Payer: COMMERCIAL

## 2021-04-09 DIAGNOSIS — Z82.49 FAMILY HISTORY OF ABDOMINAL AORTIC ANEURYSM: ICD-10-CM

## 2021-04-09 LAB
ALBUMIN SERPL-MCNC: 3.1 GM/DL (ref 3.4–5)
ALP BLD-CCNC: 41 IU/L (ref 40–129)
ALT SERPL-CCNC: 11 U/L (ref 10–40)
ANION GAP SERPL CALCULATED.3IONS-SCNC: 11 MMOL/L (ref 4–16)
AST SERPL-CCNC: 19 IU/L (ref 15–37)
BASOPHILS ABSOLUTE: 0.1 K/CU MM
BASOPHILS RELATIVE PERCENT: 0.9 % (ref 0–1)
BILIRUB SERPL-MCNC: 0.9 MG/DL (ref 0–1)
BUN BLDV-MCNC: 19 MG/DL (ref 6–23)
CALCIUM SERPL-MCNC: 8.5 MG/DL (ref 8.3–10.6)
CHLORIDE BLD-SCNC: 100 MMOL/L (ref 99–110)
CO2: 27 MMOL/L (ref 21–32)
CREAT SERPL-MCNC: 1 MG/DL (ref 0.6–1.1)
DIFFERENTIAL TYPE: ABNORMAL
EOSINOPHILS ABSOLUTE: 0.1 K/CU MM
EOSINOPHILS RELATIVE PERCENT: 1.5 % (ref 0–3)
GFR AFRICAN AMERICAN: >60 ML/MIN/1.73M2
GFR NON-AFRICAN AMERICAN: 53 ML/MIN/1.73M2
GLUCOSE BLD-MCNC: 90 MG/DL (ref 70–99)
HCT VFR BLD CALC: 38.2 % (ref 37–47)
HEMOGLOBIN: 12.2 GM/DL (ref 12.5–16)
IMMATURE NEUTROPHIL %: 0.3 % (ref 0–0.43)
LYMPHOCYTES ABSOLUTE: 1.6 K/CU MM
LYMPHOCYTES RELATIVE PERCENT: 19 % (ref 24–44)
MCH RBC QN AUTO: 29.3 PG (ref 27–31)
MCHC RBC AUTO-ENTMCNC: 31.9 % (ref 32–36)
MCV RBC AUTO: 91.8 FL (ref 78–100)
MONOCYTES ABSOLUTE: 0.6 K/CU MM
MONOCYTES RELATIVE PERCENT: 7.3 % (ref 0–4)
NUCLEATED RBC %: 0 %
PDW BLD-RTO: 13.7 % (ref 11.7–14.9)
PLATELET # BLD: 189 K/CU MM (ref 140–440)
PMV BLD AUTO: 10.5 FL (ref 7.5–11.1)
POTASSIUM SERPL-SCNC: 3.2 MMOL/L (ref 3.5–5.1)
RBC # BLD: 4.16 M/CU MM (ref 4.2–5.4)
SEGMENTED NEUTROPHILS ABSOLUTE COUNT: 6.1 K/CU MM
SEGMENTED NEUTROPHILS RELATIVE PERCENT: 71 % (ref 36–66)
SODIUM BLD-SCNC: 138 MMOL/L (ref 135–145)
TOTAL IMMATURE NEUTOROPHIL: 0.03 K/CU MM
TOTAL NUCLEATED RBC: 0 K/CU MM
TOTAL PROTEIN: 4.9 GM/DL (ref 6.4–8.2)
WBC # BLD: 8.6 K/CU MM (ref 4–10.5)

## 2021-04-09 PROCEDURE — 80053 COMPREHEN METABOLIC PANEL: CPT

## 2021-04-09 PROCEDURE — 93978 VASCULAR STUDY: CPT

## 2021-04-09 PROCEDURE — 36415 COLL VENOUS BLD VENIPUNCTURE: CPT

## 2021-04-09 PROCEDURE — 85025 COMPLETE CBC W/AUTO DIFF WBC: CPT

## 2021-04-24 ENCOUNTER — APPOINTMENT (OUTPATIENT)
Dept: CT IMAGING | Age: 81
End: 2021-04-24
Payer: COMMERCIAL

## 2021-04-24 ENCOUNTER — APPOINTMENT (OUTPATIENT)
Dept: GENERAL RADIOLOGY | Age: 81
End: 2021-04-24
Payer: COMMERCIAL

## 2021-04-24 ENCOUNTER — HOSPITAL ENCOUNTER (EMERGENCY)
Age: 81
Discharge: OTHER INSTITUTION | End: 2021-04-24
Attending: EMERGENCY MEDICINE
Payer: COMMERCIAL

## 2021-04-24 VITALS
SYSTOLIC BLOOD PRESSURE: 110 MMHG | BODY MASS INDEX: 32.94 KG/M2 | TEMPERATURE: 98.2 F | OXYGEN SATURATION: 98 % | DIASTOLIC BLOOD PRESSURE: 60 MMHG | HEART RATE: 80 BPM | RESPIRATION RATE: 17 BRPM | HEIGHT: 62 IN | WEIGHT: 179 LBS

## 2021-04-24 DIAGNOSIS — S22.42XA CLOSED FRACTURE OF MULTIPLE RIBS OF LEFT SIDE, INITIAL ENCOUNTER: ICD-10-CM
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
## 2021-04-24 DIAGNOSIS — S27.0XXA TRAUMATIC PNEUMOTHORAX, INITIAL ENCOUNTER: Primary | ICD-10-CM

## 2021-04-24 DIAGNOSIS — J94.2 HEMOTHORAX, LEFT: ICD-10-CM

## 2021-04-24 LAB
ALBUMIN SERPL-MCNC: 4.4 GM/DL (ref 3.4–5)
ALP BLD-CCNC: 382 IU/L (ref 40–129)
ALT SERPL-CCNC: 75 U/L (ref 10–40)
ANION GAP SERPL CALCULATED.3IONS-SCNC: 11 MMOL/L (ref 4–16)
AST SERPL-CCNC: 59 IU/L (ref 15–37)
BASOPHILS ABSOLUTE: 0.1 K/CU MM
BASOPHILS RELATIVE PERCENT: 1.1 % (ref 0–1)
BILIRUB SERPL-MCNC: 0.6 MG/DL (ref 0–1)
BUN BLDV-MCNC: 13 MG/DL (ref 6–23)
CALCIUM SERPL-MCNC: 9.4 MG/DL (ref 8.3–10.6)
CHLORIDE BLD-SCNC: 97 MMOL/L (ref 99–110)
CO2: 29 MMOL/L (ref 21–32)
CREAT SERPL-MCNC: 1 MG/DL (ref 0.6–1.1)
DIFFERENTIAL TYPE: ABNORMAL
EKG ATRIAL RATE: 85 BPM
EKG DIAGNOSIS: NORMAL
EKG P AXIS: 41 DEGREES
EKG P-R INTERVAL: 144 MS
EKG Q-T INTERVAL: 414 MS
EKG QRS DURATION: 130 MS
EKG QTC CALCULATION (BAZETT): 492 MS
EKG R AXIS: -5 DEGREES
EKG T AXIS: -1 DEGREES
EKG VENTRICULAR RATE: 85 BPM
EOSINOPHILS ABSOLUTE: 0.1 K/CU MM
EOSINOPHILS RELATIVE PERCENT: 1.3 % (ref 0–3)
GFR AFRICAN AMERICAN: >60 ML/MIN/1.73M2
GFR NON-AFRICAN AMERICAN: 53 ML/MIN/1.73M2
GLUCOSE BLD-MCNC: 197 MG/DL (ref 70–99)
HCT VFR BLD CALC: 37.9 % (ref 37–47)
HEMOGLOBIN: 11.8 GM/DL (ref 12.5–16)
IMMATURE NEUTROPHIL %: 0.7 % (ref 0–0.43)
LYMPHOCYTES ABSOLUTE: 1.4 K/CU MM
LYMPHOCYTES RELATIVE PERCENT: 16.7 % (ref 24–44)
MCH RBC QN AUTO: 28.2 PG (ref 27–31)
MCHC RBC AUTO-ENTMCNC: 31.1 % (ref 32–36)
MCV RBC AUTO: 90.5 FL (ref 78–100)
MONOCYTES ABSOLUTE: 0.5 K/CU MM
MONOCYTES RELATIVE PERCENT: 6.5 % (ref 0–4)
NUCLEATED RBC %: 0 %
PDW BLD-RTO: 13.6 % (ref 11.7–14.9)
PLATELET # BLD: 180 K/CU MM (ref 140–440)
PMV BLD AUTO: 10.3 FL (ref 7.5–11.1)
POTASSIUM SERPL-SCNC: 4.2 MMOL/L (ref 3.5–5.1)
RBC # BLD: 4.19 M/CU MM (ref 4.2–5.4)
SEGMENTED NEUTROPHILS ABSOLUTE COUNT: 6.1 K/CU MM
SEGMENTED NEUTROPHILS RELATIVE PERCENT: 73.7 % (ref 36–66)
SODIUM BLD-SCNC: 137 MMOL/L (ref 135–145)
TOTAL IMMATURE NEUTOROPHIL: 0.06 K/CU MM
TOTAL NUCLEATED RBC: 0 K/CU MM
TOTAL PROTEIN: 7.2 GM/DL (ref 6.4–8.2)
TROPONIN T: 0.07 NG/ML
WBC # BLD: 8.3 K/CU MM (ref 4–10.5)

## 2021-04-24 PROCEDURE — 70450 CT HEAD/BRAIN W/O DYE: CPT

## 2021-04-24 PROCEDURE — 80053 COMPREHEN METABOLIC PANEL: CPT

## 2021-04-24 PROCEDURE — 99285 EMERGENCY DEPT VISIT HI MDM: CPT

## 2021-04-24 PROCEDURE — 71045 X-RAY EXAM CHEST 1 VIEW: CPT

## 2021-04-24 PROCEDURE — 84484 ASSAY OF TROPONIN QUANT: CPT

## 2021-04-24 PROCEDURE — 32551 INSERTION OF CHEST TUBE: CPT

## 2021-04-24 PROCEDURE — 2580000003 HC RX 258: Performed by: PHYSICIAN ASSISTANT

## 2021-04-24 PROCEDURE — 6370000000 HC RX 637 (ALT 250 FOR IP): Performed by: PHYSICIAN ASSISTANT

## 2021-04-24 PROCEDURE — 36415 COLL VENOUS BLD VENIPUNCTURE: CPT

## 2021-04-24 PROCEDURE — 93010 ELECTROCARDIOGRAM REPORT: CPT | Performed by: INTERNAL MEDICINE

## 2021-04-24 PROCEDURE — 85025 COMPLETE CBC W/AUTO DIFF WBC: CPT

## 2021-04-24 PROCEDURE — 74177 CT ABD & PELVIS W/CONTRAST: CPT

## 2021-04-24 PROCEDURE — 72125 CT NECK SPINE W/O DYE: CPT

## 2021-04-24 PROCEDURE — 96374 THER/PROPH/DIAG INJ IV PUSH: CPT

## 2021-04-24 PROCEDURE — 2580000003 HC RX 258: Performed by: EMERGENCY MEDICINE

## 2021-04-24 PROCEDURE — 2500000003 HC RX 250 WO HCPCS: Performed by: EMERGENCY MEDICINE

## 2021-04-24 PROCEDURE — 6360000004 HC RX CONTRAST MEDICATION: Performed by: PHYSICIAN ASSISTANT

## 2021-04-24 PROCEDURE — 2700000000 HC OXYGEN THERAPY PER DAY

## 2021-04-24 PROCEDURE — 51702 INSERT TEMP BLADDER CATH: CPT

## 2021-04-24 PROCEDURE — 71260 CT THORAX DX C+: CPT

## 2021-04-24 PROCEDURE — 6360000002 HC RX W HCPCS: Performed by: EMERGENCY MEDICINE

## 2021-04-24 PROCEDURE — 93005 ELECTROCARDIOGRAM TRACING: CPT | Performed by: PHYSICIAN ASSISTANT

## 2021-04-24 RX ORDER — 0.9 % SODIUM CHLORIDE 0.9 %
500 INTRAVENOUS SOLUTION INTRAVENOUS ONCE
Status: COMPLETED | OUTPATIENT
Start: 2021-04-24 | End: 2021-04-24

## 2021-04-24 RX ORDER — 0.9 % SODIUM CHLORIDE 0.9 %
1000 INTRAVENOUS SOLUTION INTRAVENOUS ONCE
Status: COMPLETED | OUTPATIENT
Start: 2021-04-24 | End: 2021-04-24

## 2021-04-24 RX ORDER — 0.9 % SODIUM CHLORIDE 0.9 %
10 VIAL (ML) INJECTION
Status: COMPLETED | OUTPATIENT
Start: 2021-04-24 | End: 2021-04-24

## 2021-04-24 RX ORDER — HYDROCODONE BITARTRATE AND ACETAMINOPHEN 5; 325 MG/1; MG/1
1 TABLET ORAL ONCE
Status: COMPLETED | OUTPATIENT
Start: 2021-04-24 | End: 2021-04-24

## 2021-04-24 RX ORDER — FENTANYL CITRATE 50 UG/ML
25 INJECTION, SOLUTION INTRAMUSCULAR; INTRAVENOUS
Status: DISCONTINUED | OUTPATIENT
Start: 2021-04-24 | End: 2021-04-24 | Stop reason: HOSPADM

## 2021-04-24 RX ADMIN — LIDOCAINE HYDROCHLORIDE 10 ML: 10 INJECTION, SOLUTION EPIDURAL; INFILTRATION; INTRACAUDAL; PERINEURAL at 13:50

## 2021-04-24 RX ADMIN — SODIUM CHLORIDE 1000 ML: 9 INJECTION, SOLUTION INTRAVENOUS at 13:55

## 2021-04-24 RX ADMIN — IOPAMIDOL 75 ML: 755 INJECTION, SOLUTION INTRAVENOUS at 12:37

## 2021-04-24 RX ADMIN — SODIUM CHLORIDE 500 ML: 9 INJECTION, SOLUTION INTRAVENOUS at 12:13

## 2021-04-24 RX ADMIN — Medication 10 ML: at 12:37

## 2021-04-24 RX ADMIN — HYDROCODONE BITARTRATE AND ACETAMINOPHEN 1 TABLET: 5; 325 TABLET ORAL at 12:13

## 2021-04-24 RX ADMIN — FENTANYL CITRATE 25 MCG: 50 INJECTION INTRAMUSCULAR; INTRAVENOUS at 13:54

## 2021-04-24 ASSESSMENT — PAIN DESCRIPTION - DESCRIPTORS: DESCRIPTORS: ACHING;CONSTANT

## 2021-04-24 ASSESSMENT — PAIN SCALES - GENERAL
PAINLEVEL_OUTOF10: 5
PAINLEVEL_OUTOF10: 6
PAINLEVEL_OUTOF10: 7

## 2021-04-24 ASSESSMENT — PAIN DESCRIPTION - PROGRESSION: CLINICAL_PROGRESSION: NOT CHANGED

## 2021-04-24 ASSESSMENT — PAIN DESCRIPTION - ONSET: ONSET: ON-GOING

## 2021-04-24 ASSESSMENT — PAIN DESCRIPTION - FREQUENCY: FREQUENCY: CONTINUOUS

## 2021-04-24 NOTE — ED PROVIDER NOTES
I independently examined and evaluated Elizabeth Hall. In brief their history revealed a 78 y.o. female with past medical history of hypertension, type 2 diabetes mellitus, hyperlipidemia, CHF who presents via EMS from home following a fall. Patient states that she had ambulated from her room to the restroom using a walker, has stood up from the toilet and was walking towards the vanity when she had a fall onto her left side. She is unsure of what caused the fall, denies any preceding chest pain, shortness of breath, lightheadedness, dizziness. States she usually is able to grab onto the vanity after standing up from the toilet, states that she did reach for her walker, however this fell as well. She hit the posterior aspect of her head, denies loss of consciousness. Her sister-in-law was in the home this morning as well. When she fell, immediately called EMS. She complains of left-sided chest wall pain and flank pain primarily. Denies use of blood thinning medications. No numbness, tingling, weakness of extremities. No visual changes, nausea or vomiting. Their focused exam revealed alert and oriented female resting in bed normocephalic has contusion of her right forehead cranial nerves intact voice normal lungs clear bilaterally does have slight decreased breath sounds on the left, does have chest wall pain on the left. Heart regular rhythm 2+ pulses throughout abdomen soft obese nontender bowel sounds present normal.  5/5 strength throughout skin has no other rash or swelling no arm pain no leg pain pelvis is stable    ED course: Patient seen with PA please see her note. Patient here after fall. I got involved in patient's care as patient was found to have a left-sided moderate pneumothorax, hemothorax. Patient otherwise is well. Vital signs are stable she did consent for chest tube placement.   Chest tube placed left anterior rib space, repeat x-ray shows good position she did have about 50

## 2021-04-24 NOTE — ED NOTES
Report called to [facility] ER. 0 further questions noted.        Tree Dc RN  04/24/21 8425

## 2021-04-24 NOTE — ED TRIAGE NOTES
Arrived to room 22 after falling. Reports Left side pain.  Did not hit her head or lose consciousness

## 2021-04-24 NOTE — PROGRESS NOTES
04/24/21 1412   Oxygen Therapy/Pulse Ox   O2 Therapy Oxygen   $Oxygen $Daily Charge   O2 Device Nasal cannula   O2 Flow Rate (L/min) 6 L/min   Resp 18   SpO2 100 %   Pulse Oximeter Device Mode Continuous   This RT weaned to 3L after being stby for CT insertion with no complication

## 2021-04-24 NOTE — ED PROVIDER NOTES
eMERGENCY dEPARTMENT eNCOUnter      PCP: Edith Montgomery MD    CHIEF COMPLAINT    Chief Complaint   Patient presents with   Ariel Albert       HPI    Altaf Elliott is a 78 y.o. female with past medical history of hypertension, type 2 diabetes mellitus, hyperlipidemia, CHF who presents via EMS from home following a fall. Patient states that she had ambulated from her room to the restroom using a walker, has stood up from the toilet and was walking towards the vanity when she had a fall onto her left side. She is unsure of what caused the fall, denies any preceding chest pain, shortness of breath, lightheadedness, dizziness. States she usually is able to grab onto the vanity after standing up from the toilet, states that she did reach for her walker, however this fell as well. She hit the posterior aspect of her head, denies loss of consciousness. Her sister-in-law was in the home this morning as well. When she fell, immediately called EMS. She complains of left-sided chest wall pain and flank pain primarily. Denies use of blood thinning medications. No numbness, tingling, weakness of extremities. No visual changes, nausea or vomiting. REVIEW OF SYSTEMS    General: No fever  ENT:  No visual changes. No headache. Cardiac: No Chest Pain, denies syncope  Respiratory: No cough or difficulty breathing  GI: No vomiting. No Bloody Stool or Diarrhea  : No Dysuria or Hematuria  MSKTL:  See HPI. No neck or back pain. Neurologic: denies LOC, no headache, dizziness, confusion.   No hearing loss    See HPI and nursing notes for additional information     PAST MEDICAL & SURGICAL HISTORY    Past Medical History:   Diagnosis Date    Family history of abdominal aortic aneurysm     CT Abd Aorta 10/2007 - normal aorta    Family history of colon cancer     Colonoscopy due 1/2013    Fracture of left humerus 3/2014    Carozza    Hypertension     Hypothyroidism, postsurgical 1997    Left Thyroid Lobectomy    Lumbar Spinal Stenosis 2006    severe    Multinodular goiter 1996    suppresion and bx negative; s/p thyroid lobectomy    CHAUDHRY (nonalcoholic steatohepatitis) 2005    chaudhry vs cirrhosis;  Dr. David Diaz Necrobiosis lipoidica diabeticorum ([facility] [address]     Obesity, Class II, BMI 35-39.9, with comorbidity     Osteoporosis 1/2015    FRAX 5.5 & 17%    Polymyalgia rheumatica ([facility] [address] 1/2012    Dr Franchesca Richmond R; released her 3/2014    RBBB 02/2017    new finding    Type II Diabetes Mellitus 1994    per pt on 3/14/2019\"they do not consider me diabetic anymore- took me off medication long time ago\"    Urolithiasis     once     Past Surgical History:   Procedure Laterality Date    ANUS SURGERY  ? when    Anal Fissure Repair    CARPAL TUNNEL RELEASE Right 3/18/2019    RIGHT CARPAL TUNNEL RELEASE performed by Sisi Wilson MD at [address] [facility] Bilateral 10+ yrs ago    CHOLECYSTECTOMY, [address]  last one 5+ ys ago     ELBOW FRACTURE SURGERY Left 1/2011    ORIF    KNEE ARTHROSCOPY Right 2008    ORIF HUMERUS DECOMPRESSION Right 12/2006    right humeral fracture    SHOULDER ARTHROSCOPY Right ?when    JANA AND BSO      \"sometime in my 50's\"    THYROID LOBECTOMY Left 1995    TONSILLECTOMY  as a child       CURRENT MEDICATIONS    Current Outpatient Rx   Medication Sig Dispense Refill    metoprolol succinate (TOPROL XL) 25 MG extended release tablet Take 0.5 tablets by mouth daily 30 tablet 5    furosemide (LASIX) 20 MG tablet Take 1 tablet by mouth daily 30 tablet 3    alendronate (FOSAMAX) 70 MG tablet Take 1 tablet by mouth every 7 days (Patient taking differently: Take 70 mg by mouth every 7 days Mondays) 12 tablet 1    Handicap Placard MISC by Does not apply route Good for 5 years 1 each 0    levothyroxine (SYNTHROID) 100 MCG tablet Take 1 tablet by mouth daily 90 tablet 1    Cholecalciferol (VITAMIN D) 2000 UNITS CAPS capsule Take 1 capsule by mouth daily. ALLERGIES    Allergies   Allergen Reactions    Penicillins Rash       SOCIAL & FAMILY HISTORY    Social History     Socioeconomic History    Marital status:      Spouse name: None    Number of children: 2    Years of education: high schoo    Highest education level: None   Occupational History    Occupation: retired   Social Needs    Financial resource strain: None    Food insecurity     Worry: None     Inability: None    Transportation needs     Medical: None     Non-medical: None   Tobacco Use    Smoking status: Never Smoker    Smokeless tobacco: Never Used   Substance and Sexual Activity    Alcohol use: No    Drug use: No    Sexual activity: None   Lifestyle    Physical activity     Days per week: None     Minutes per session: None    Stress: None   Relationships    Social connections     Talks on phone: None     Gets together: None     Attends Restorationist service: None     Active member of club or organization: None     Attends meetings of clubs or organizations: None     Relationship status: None    Intimate partner violence     Fear of current or ex partner: None     Emotionally abused: None     Physically abused: None     Forced sexual activity: None   Other Topics Concern    None   Social History Narrative    None     Family History   Problem Relation Age of Onset    Other Father         heart issues    Stroke Father     Heart Disease Brother     Other Brother         hx of heart cath with blockage    Heart Surgery Brother     Heart Surgery Brother     Cancer Brother 68        lung    Other Son         knee and multiple issues       PHYSICAL EXAM    VITAL SIGNS: /62   Pulse 87   Temp 97.9 °F (36.6 °C) (Oral)   Resp 18   Ht 5' 2\" (1.575 m)   Wt 179 lb (81.2 kg)   SpO2 100%   BMI 32.74 kg/m²    Constitutional:  Well developed, well nourished, no acute distress   Eyes: EOMI. PERRL, sclera nonicteric. Anterior chambers clear.    Funduscopic exam without any gross abnormality or hemorrhages. HENT:  Atraumatic, no trismus. Ears canals and TMs free of blood or clear fluid. Nasal passages and oropharynx free of blood or clear fluid. Neck/Lymphatics: supple, no JVD, no swollen nodes. no posterior neck tenderness  Respiratory:  Lungs Clear, no retractions   Cardiovascular:  regular rate, no murmurs  GI: No appreciable bruising, swelling, erythema of abdomen. Abdomen is soft. Discomfort palpation into left lower ribs and into left upper abdomen and left flank with left-sided CVA tenderness. Musculoskeletal: Bilateral lower extremity edema  No appreciable bruising, swelling or other skin changes noted to chest wall. There is discomfort to palpation of left anterior, lateral and posterior chest wall without crepitus or step-off. Left upper extremity without appreciable bruising, swelling or erythema. Full range of motion of left shoulder without discomfort. Sensation intact throughout bilateral upper extremities, radial pulse 2+ bilaterally.  strength 5 out of 5 bilaterally  No midline tenderness palpation of spine, no step-off. Integument:  Well hydrated, no petechiae   Neurologic:    Alert & oriented , No focal deficits noted. Cranial nerves II through XII grossly intact. Finger to nose intact. No pronator drift. Normal gross motor coordination & motor strength bilateral upper and lower extremities, upper and lower extremity DTRs intact. Sensation intact.   Psych: Pleasant affect, no hallucinations        LABS:  Results for orders placed or performed during the hospital encounter of 04/24/21   CBC auto diff   Result Value Ref Range    WBC 8.3 4.0 - 10.5 K/CU MM    RBC 4.19 (L) 4.2 - 5.4 M/CU MM    Hemoglobin 11.8 (L) 12.5 - 16.0 GM/DL    Hematocrit 37.9 37 - 47 %    MCV 90.5 78 - 100 FL    MCH 28.2 27 - 31 PG    MCHC 31.1 (L) 32.0 - 36.0 %    RDW 13.6 11.7 - 14.9 %    Platelets 053 050 - 958 K/CU MM    MPV 10.3 7.5 - 11.1 FL    Differential Type AUTOMATED DIFFERENTIAL     Segs Relative 73.7 (H) 36 - 66 %    Lymphocytes % 16.7 (L) 24 - 44 %    Monocytes % 6.5 (H) 0 - 4 %    Eosinophils % 1.3 0 - 3 %    Basophils % 1.1 (H) 0 - 1 %    Segs Absolute 6.1 K/CU MM    Lymphocytes Absolute 1.4 K/CU MM    Monocytes Absolute 0.5 K/CU MM    Eosinophils Absolute 0.1 K/CU MM    Basophils Absolute 0.1 K/CU MM    Nucleated RBC % 0.0 %    Total Nucleated RBC 0.0 K/CU MM    Total Immature Neutrophil 0.06 K/CU MM    Immature Neutrophil % 0.7 (H) 0 - 0.43 %   CMP   Result Value Ref Range    Sodium 137 135 - 145 MMOL/L    Potassium 4.2 3.5 - 5.1 MMOL/L    Chloride 97 (L) 99 - 110 mMol/L    CO2 29 21 - 32 MMOL/L    BUN 13 6 - 23 MG/DL    CREATININE 1.0 0.6 - 1.1 MG/DL    Glucose 197 (H) 70 - 99 MG/DL    Calcium 9.4 8.3 - 10.6 MG/DL    Albumin 4.4 3.4 - 5.0 GM/DL    Total Protein 7.2 6.4 - 8.2 GM/DL    Total Bilirubin 0.6 0.0 - 1.0 MG/DL    ALT 75 (H) 10 - 40 U/L    AST 59 (H) 15 - 37 IU/L    Alkaline Phosphatase 382 (H) 40 - 129 IU/L    GFR Non- 53 (L) >60 mL/min/1.73m2    GFR African American >60 >60 mL/min/1.73m2    Anion Gap 11 4 - 16   Troponin   Result Value Ref Range    Troponin T 0.069 (H) <0.01 NG/ML   EKG 12 Lead   Result Value Ref Range    Ventricular Rate 85 BPM    Atrial Rate 85 BPM    P-R Interval 144 ms    QRS Duration 130 ms    Q-T Interval 414 ms    QTc Calculation (Bazett) 492 ms    P Axis 41 degrees    R Axis -5 degrees    T Axis -1 degrees    Diagnosis       Normal sinus rhythm  Right bundle branch block  Abnormal ECG  When compared with ECG of 19-MAR-2021 04:54,  QRS axis shifted right  QT has shortened           EKG Interpretation  Please see ED physician's note for EKG interpretation      RADIOLOGY     CT ABDOMEN PELVIS W IV CONTRAST Additional Contrast? None   Preliminary Result   No acute traumatic injury in the abdomen or pelvis. No evidence of solid   organ injury. Evidence of remote calcified granulomatous disease.       Please see separate CT chest for discussion of those findings. Mild hepatic steatosis. Chronic compression fractures of L3 and L4.         CT CHEST W CONTRAST   Preliminary Result   Acute traumatic displaced fractures of the left posterior 4th through 6th   ribs with nondisplaced fractures of the left anterolateral 3rd through 7th   ribs. Associated left chest wall subcutaneous emphysema. Grade 2 chest wall   injury. Small left hemothorax with moderate size left pneumothorax. Grade 2 lung   injury. Remote fractures of multiple right ribs. Incidentally noted 6 mm right lower lobe pulmonary nodule. Please see   follow-up recommendations below. Incidentally noted 9 x 13 mm right thyroid lobe nodule. Status post left   thyroid lobectomy. No additional follow-up is required as per   recommendations below. RECOMMENDATIONS:   6 mm right solid pulmonary nodule. Recommend a non-contrast Chest CT at 6-12   months, then another non-contrast Chest CT at 18-24 months. These guidelines do not apply to immunocompromised patients and patients with   cancer. Follow up in patients with significant comorbidities as clinically   warranted. For lung cancer screening, adhere to Lung-RADS guidelines. Reference: Radiology. 2017; 284(1):228-43. Managing Incidental Thyroid Nodule Detected at CT or MRI or US      1. Further evaluation by thyroid Ultrasound recommended for these incidental   nodules:      Patient Age 28 years or more - Nodule 1.5 cm in size or greater      2. Follow up thyroid ultrasound also recommend in these scenarios      - Solitary nodule with high risk imaging features (locally invasive nodule or   suspicious lymph nodes)      - Heterogeneous, enlarged thyroid gland.      - Increased uptake on PET      3.  NO further imaging is recommended in the following scenarios      - Any nodule not meeting above criteria.       - Those patients with limited life expectancy or significant Co-morbidities. Note: These recommendations do not apply to pts. w/ increased risk      for thyroid cancer or pts. with symptomatic thyroid disease.      ________________________________________________________________      Recommendations for f/u of Incidental Thyroid Nodules (ITN)      found on CT, MR, NM and Extrathyroidal US are based upon the ACR      white paper and [facility] 3-tiered system for managing ITNs:      J Am Gissel Radiol. 2015 Feb;12(2): 143-50         CT CERVICAL SPINE WO CONTRAST   Preliminary Result   No acute traumatic injury of the cervical spine. Mild multilevel degenerative changes. CT HEAD WO CONTRAST   Final Result   No acute intracranial abnormality. Diffuse atrophic changes with findings suggesting chronic microvascular   ischemia         XR CHEST PORTABLE    (Results Pending)         ED COURSE & MEDICAL DECISION MAKING       Vital signs and nursing notes reviewed during ED course. Patient care and presentation staffed with supervising MD.   Patient seen by supervising MD today- see his/her note for details of the encounter. Patient presents as above following a fall at home. Blood pressure soft on arrival at 92/56. She is afebrile, not tachycardic, oxygenating 98% on room air. She started on gentle fluids with known history of CHF. Given dose of Norco for pain control. Lab work with mildly elevated ALT, AST as well as alk phos. Troponin is elevated at 2.069, has been elevated similarly in the past month when she was in the hospital as well. Baseline kidney function. Imaging without evidence of abnormality of the abdomen and pelvis, no solid organ injury. Chronic compression fractures of L3 and L4. Chest CT with acute, traumatic displaced fractures of left posterior fourth through sixth ribs with nondisplaced fractures of the left anterior lateral third through seventh ribs. Associated left chest wall subcutaneous emphysema. Grade 2 chest wall injury. There is small left hemothorax with moderate sized left pneumothorax. Grade 2 lung injury. Remote fractures of multiple right ribs. Chest tube was placed by supervising physician, Dr. Ashley Anglin. We will plan on transfer to trauma center for further evaluation and treatment. [facility] consulted, I discussed case with Dr. Senait Castillo who is agreeable with patient transfer. Ground MICU activated. Clinical  IMPRESSION    1. Traumatic pneumothorax, initial encounter    2. Fall, initial encounter    3. Closed fracture of multiple ribs of left side, initial encounter    4. Hemothorax, left        Transfer to [address] [place] [address] Rd: Please note this report has been produced using speech recognition software and may contain errors related to that system including errors in grammar, punctuation, and spelling, as well as words and phrases that may be inappropriate. If there are any questions or concerns please feel free to contact the dictating provider for clarification.       SIL Carl  04/24/21 [id number]

## 2021-05-03 ENCOUNTER — PATIENT MESSAGE (OUTPATIENT)
Dept: INTERNAL MEDICINE CLINIC | Age: 81
End: 2021-05-03

## 2021-05-03 ENCOUNTER — HOSPITAL ENCOUNTER (OUTPATIENT)
Age: 81
Setting detail: SPECIMEN
Discharge: HOME | End: 2021-05-03

## 2021-05-03 LAB
ALBUMIN SERPL-MCNC: 2.6 GM/DL (ref 3.4–5)
ALP BLD-CCNC: 57 IU/L (ref 40–128)
ALT SERPL-CCNC: 10 U/L (ref 10–40)
ANION GAP SERPL CALCULATED.3IONS-SCNC: 10 MMOL/L (ref 4–16)
AST SERPL-CCNC: 14 IU/L (ref 15–37)
BILIRUB SERPL-MCNC: 0.4 MG/DL (ref 0–1)
BUN BLDV-MCNC: 18 MG/DL (ref 6–23)
CALCIUM SERPL-MCNC: 7.9 MG/DL (ref 8.3–10.6)
CHLORIDE BLD-SCNC: 104 MMOL/L (ref 99–110)
CO2: 21 MMOL/L (ref 21–32)
CREAT SERPL-MCNC: 0.8 MG/DL (ref 0.6–1.1)
GFR AFRICAN AMERICAN: >60 ML/MIN/1.73M2
GFR NON-AFRICAN AMERICAN: >60 ML/MIN/1.73M2
GLUCOSE BLD-MCNC: 78 MG/DL (ref 70–99)
HCT VFR BLD CALC: 33.1 % (ref 37–47)
HEMOGLOBIN: 10.6 GM/DL (ref 12.5–16)
MCH RBC QN AUTO: 29.3 PG (ref 27–31)
MCHC RBC AUTO-ENTMCNC: 32 % (ref 32–36)
MCV RBC AUTO: 91.4 FL (ref 78–100)
PDW BLD-RTO: 14.3 % (ref 11.7–14.9)
PLATELET # BLD: 247 K/CU MM (ref 140–440)
PMV BLD AUTO: 9.7 FL (ref 7.5–11.1)
POTASSIUM SERPL-SCNC: 4.7 MMOL/L (ref 3.5–5.1)
RBC # BLD: 3.62 M/CU MM (ref 4.2–5.4)
SODIUM BLD-SCNC: 135 MMOL/L (ref 135–145)
TOTAL PROTEIN: 4.2 GM/DL (ref 6.4–8.2)
WBC # BLD: 7.8 K/CU MM (ref 4–10.5)

## 2021-05-03 PROCEDURE — 80053 COMPREHEN METABOLIC PANEL: CPT

## 2021-05-03 PROCEDURE — 85027 COMPLETE CBC AUTOMATED: CPT

## 2021-05-03 PROCEDURE — 36415 COLL VENOUS BLD VENIPUNCTURE: CPT

## 2021-05-07 ENCOUNTER — HOSPITAL ENCOUNTER (OUTPATIENT)
Age: 81
Setting detail: SPECIMEN
Discharge: HOME | End: 2021-05-07
Payer: COMMERCIAL

## 2021-05-07 LAB
T4 FREE: 1.77 NG/DL (ref 0.9–1.8)
TSH HIGH SENSITIVITY: 2.7 UIU/ML (ref 0.27–4.2)

## 2021-05-07 PROCEDURE — 36415 COLL VENOUS BLD VENIPUNCTURE: CPT

## 2021-05-07 PROCEDURE — 84439 ASSAY OF FREE THYROXINE: CPT

## 2021-05-07 PROCEDURE — 84443 ASSAY THYROID STIM HORMONE: CPT

## 2021-05-12 ENCOUNTER — HOSPITAL ENCOUNTER (OUTPATIENT)
Age: 81
Setting detail: SPECIMEN
Discharge: HOME | End: 2021-05-12

## 2021-05-12 PROCEDURE — 87086 URINE CULTURE/COLONY COUNT: CPT

## 2021-05-12 PROCEDURE — 81001 URINALYSIS AUTO W/SCOPE: CPT

## 2021-05-13 LAB
BACTERIA: ABNORMAL /HPF
BILIRUBIN URINE: NEGATIVE MG/DL
BLOOD, URINE: NEGATIVE
CLARITY: ABNORMAL
COLOR: YELLOW
GLUCOSE, URINE: NEGATIVE MG/DL
KETONES, URINE: NEGATIVE MG/DL
LEUKOCYTE ESTERASE, URINE: ABNORMAL
MUCUS: ABNORMAL HPF
NITRITE URINE, QUANTITATIVE: NEGATIVE
PH, URINE: 5 (ref 5–8)
PROTEIN UA: NEGATIVE MG/DL
RBC URINE: 2 /HPF (ref 0–6)
SPECIFIC GRAVITY UA: 1.03 (ref 1–1.03)
SQUAMOUS EPITHELIAL: 4 /HPF
TRICHOMONAS: ABNORMAL /HPF
UROBILINOGEN, URINE: NEGATIVE MG/DL (ref 0.2–1)
WBC UA: 38 /HPF (ref 0–5)

## 2021-05-14 LAB
CULTURE: NORMAL
Lab: NORMAL
SPECIMEN: NORMAL

## 2021-05-24 ENCOUNTER — HOSPITAL ENCOUNTER (OUTPATIENT)
Age: 81
Setting detail: SPECIMEN
Discharge: HOME | End: 2021-05-24

## 2021-05-24 LAB
ANION GAP SERPL CALCULATED.3IONS-SCNC: 13 MMOL/L (ref 4–16)
BUN BLDV-MCNC: 46 MG/DL (ref 6–23)
CALCIUM SERPL-MCNC: 8.1 MG/DL (ref 8.3–10.6)
CHLORIDE BLD-SCNC: 105 MMOL/L (ref 99–110)
CO2: 19 MMOL/L (ref 21–32)
CREAT SERPL-MCNC: 0.8 MG/DL (ref 0.6–1.1)
GFR AFRICAN AMERICAN: >60 ML/MIN/1.73M2
GFR NON-AFRICAN AMERICAN: >60 ML/MIN/1.73M2
GLUCOSE BLD-MCNC: 88 MG/DL (ref 70–99)
POTASSIUM SERPL-SCNC: 4.4 MMOL/L (ref 3.5–5.1)
SODIUM BLD-SCNC: 137 MMOL/L (ref 135–145)

## 2021-05-24 PROCEDURE — 36415 COLL VENOUS BLD VENIPUNCTURE: CPT

## 2021-05-24 PROCEDURE — 80048 BASIC METABOLIC PNL TOTAL CA: CPT

## 2021-05-25 ENCOUNTER — HOSPITAL ENCOUNTER (OUTPATIENT)
Age: 81
Setting detail: SPECIMEN
Discharge: HOME | End: 2021-05-25

## 2021-05-25 LAB
ANION GAP SERPL CALCULATED.3IONS-SCNC: 12 MMOL/L (ref 4–16)
BUN BLDV-MCNC: 48 MG/DL (ref 6–23)
CALCIUM SERPL-MCNC: 8.3 MG/DL (ref 8.3–10.6)
CHLORIDE BLD-SCNC: 104 MMOL/L (ref 99–110)
CO2: 22 MMOL/L (ref 21–32)
CREAT SERPL-MCNC: 0.7 MG/DL (ref 0.6–1.1)
GFR AFRICAN AMERICAN: >60 ML/MIN/1.73M2
GFR NON-AFRICAN AMERICAN: >60 ML/MIN/1.73M2
GLUCOSE BLD-MCNC: 103 MG/DL (ref 70–99)
POTASSIUM SERPL-SCNC: 3.8 MMOL/L (ref 3.5–5.1)
SODIUM BLD-SCNC: 138 MMOL/L (ref 135–145)

## 2021-05-25 PROCEDURE — 36415 COLL VENOUS BLD VENIPUNCTURE: CPT

## 2021-05-25 PROCEDURE — 80048 BASIC METABOLIC PNL TOTAL CA: CPT

## 2021-05-27 ENCOUNTER — HOSPITAL ENCOUNTER (OUTPATIENT)
Age: 81
Setting detail: SPECIMEN
Discharge: HOME | End: 2021-05-27

## 2021-05-27 LAB
ANION GAP SERPL CALCULATED.3IONS-SCNC: 11 MMOL/L (ref 4–16)
BUN BLDV-MCNC: 60 MG/DL (ref 6–23)
CALCIUM SERPL-MCNC: 8.3 MG/DL (ref 8.3–10.6)
CHLORIDE BLD-SCNC: 102 MMOL/L (ref 99–110)
CO2: 22 MMOL/L (ref 21–32)
CREAT SERPL-MCNC: 0.8 MG/DL (ref 0.6–1.1)
GFR AFRICAN AMERICAN: >60 ML/MIN/1.73M2
GFR NON-AFRICAN AMERICAN: >60 ML/MIN/1.73M2
GLUCOSE BLD-MCNC: 109 MG/DL (ref 70–99)
POTASSIUM SERPL-SCNC: 4.4 MMOL/L (ref 3.5–5.1)
SODIUM BLD-SCNC: 135 MMOL/L (ref 135–145)

## 2021-05-27 PROCEDURE — 80048 BASIC METABOLIC PNL TOTAL CA: CPT

## 2021-05-27 PROCEDURE — 36415 COLL VENOUS BLD VENIPUNCTURE: CPT

## 2021-05-28 ENCOUNTER — HOSPITAL ENCOUNTER (OUTPATIENT)
Age: 81
Setting detail: SPECIMEN
Discharge: HOME | End: 2021-05-28

## 2021-05-28 LAB
ALBUMIN SERPL-MCNC: 3.1 GM/DL (ref 3.4–5)
ALP BLD-CCNC: 78 IU/L (ref 40–129)
ALT SERPL-CCNC: 9 U/L (ref 10–40)
ANION GAP SERPL CALCULATED.3IONS-SCNC: 13 MMOL/L (ref 4–16)
AST SERPL-CCNC: 9 IU/L (ref 15–37)
BASOPHILS ABSOLUTE: 0.1 K/CU MM
BASOPHILS RELATIVE PERCENT: 0.5 % (ref 0–1)
BILIRUB SERPL-MCNC: 0.5 MG/DL (ref 0–1)
BUN BLDV-MCNC: 65 MG/DL (ref 6–23)
CALCIUM SERPL-MCNC: 8 MG/DL (ref 8.3–10.6)
CHLORIDE BLD-SCNC: 104 MMOL/L (ref 99–110)
CO2: 19 MMOL/L (ref 21–32)
CREAT SERPL-MCNC: 0.9 MG/DL (ref 0.6–1.1)
DIFFERENTIAL TYPE: ABNORMAL
EOSINOPHILS ABSOLUTE: 0.4 K/CU MM
EOSINOPHILS RELATIVE PERCENT: 4 % (ref 0–3)
GFR AFRICAN AMERICAN: >60 ML/MIN/1.73M2
GFR NON-AFRICAN AMERICAN: 60 ML/MIN/1.73M2
GLUCOSE BLD-MCNC: 101 MG/DL (ref 70–99)
HCT VFR BLD CALC: 30.3 % (ref 37–47)
HEMOGLOBIN: 9.9 GM/DL (ref 12.5–16)
IMMATURE NEUTROPHIL %: 0.3 % (ref 0–0.43)
LYMPHOCYTES ABSOLUTE: 1 K/CU MM
LYMPHOCYTES RELATIVE PERCENT: 11 % (ref 24–44)
MCH RBC QN AUTO: 29.5 PG (ref 27–31)
MCHC RBC AUTO-ENTMCNC: 32.7 % (ref 32–36)
MCV RBC AUTO: 90.2 FL (ref 78–100)
MONOCYTES ABSOLUTE: 0.8 K/CU MM
MONOCYTES RELATIVE PERCENT: 8.8 % (ref 0–4)
NUCLEATED RBC %: 0 %
PDW BLD-RTO: 16.1 % (ref 11.7–14.9)
PLATELET # BLD: 196 K/CU MM (ref 140–440)
PMV BLD AUTO: 10.7 FL (ref 7.5–11.1)
POTASSIUM SERPL-SCNC: 3.8 MMOL/L (ref 3.5–5.1)
RBC # BLD: 3.36 M/CU MM (ref 4.2–5.4)
SEGMENTED NEUTROPHILS ABSOLUTE COUNT: 7.1 K/CU MM
SEGMENTED NEUTROPHILS RELATIVE PERCENT: 75.4 % (ref 36–66)
SODIUM BLD-SCNC: 136 MMOL/L (ref 135–145)
TOTAL IMMATURE NEUTOROPHIL: 0.03 K/CU MM
TOTAL NUCLEATED RBC: 0 K/CU MM
TOTAL PROTEIN: 4.5 GM/DL (ref 6.4–8.2)
WBC # BLD: 9.4 K/CU MM (ref 4–10.5)

## 2021-05-28 PROCEDURE — 80053 COMPREHEN METABOLIC PANEL: CPT

## 2021-05-28 PROCEDURE — 85025 COMPLETE CBC W/AUTO DIFF WBC: CPT

## 2021-05-28 PROCEDURE — 36415 COLL VENOUS BLD VENIPUNCTURE: CPT

## 2021-05-31 ENCOUNTER — HOSPITAL ENCOUNTER (OUTPATIENT)
Age: 81
Setting detail: SPECIMEN
Discharge: HOME | End: 2021-05-31

## 2021-05-31 ENCOUNTER — APPOINTMENT (OUTPATIENT)
Dept: GENERAL RADIOLOGY | Age: 81
DRG: 871 | End: 2021-05-31
Payer: COMMERCIAL

## 2021-05-31 ENCOUNTER — HOSPITAL ENCOUNTER (INPATIENT)
Age: 81
LOS: 5 days | Discharge: HOSPICE | DRG: 871 | End: 2021-06-05
Attending: EMERGENCY MEDICINE | Admitting: STUDENT IN AN ORGANIZED HEALTH CARE EDUCATION/TRAINING PROGRAM
Payer: COMMERCIAL

## 2021-05-31 ENCOUNTER — APPOINTMENT (OUTPATIENT)
Dept: CT IMAGING | Age: 81
DRG: 871 | End: 2021-05-31
Payer: COMMERCIAL

## 2021-05-31 DIAGNOSIS — I95.9 HYPOTENSION, UNSPECIFIED HYPOTENSION TYPE: ICD-10-CM

## 2021-05-31 DIAGNOSIS — R41.82 ALTERED MENTAL STATUS, UNSPECIFIED ALTERED MENTAL STATUS TYPE: Primary | ICD-10-CM

## 2021-05-31 DIAGNOSIS — K59.00 CONSTIPATION, UNSPECIFIED CONSTIPATION TYPE: ICD-10-CM

## 2021-05-31 LAB
ALBUMIN SERPL-MCNC: 2.9 GM/DL (ref 3.4–5)
ALP BLD-CCNC: 71 IU/L (ref 40–129)
ALT SERPL-CCNC: 9 U/L (ref 10–40)
ANION GAP SERPL CALCULATED.3IONS-SCNC: 10 MMOL/L (ref 4–16)
ANION GAP SERPL CALCULATED.3IONS-SCNC: 14 MMOL/L (ref 4–16)
AST SERPL-CCNC: 10 IU/L (ref 15–37)
BACTERIA: NEGATIVE /HPF
BASOPHILS ABSOLUTE: 0.1 K/CU MM
BASOPHILS ABSOLUTE: 0.1 K/CU MM
BASOPHILS RELATIVE PERCENT: 0.6 % (ref 0–1)
BASOPHILS RELATIVE PERCENT: 0.7 % (ref 0–1)
BILIRUB SERPL-MCNC: 0.3 MG/DL (ref 0–1)
BILIRUBIN URINE: NEGATIVE MG/DL
BLOOD, URINE: NEGATIVE
BUN BLDV-MCNC: 67 MG/DL (ref 6–23)
BUN BLDV-MCNC: 68 MG/DL (ref 6–23)
CALCIUM SERPL-MCNC: 8 MG/DL (ref 8.3–10.6)
CALCIUM SERPL-MCNC: 8.1 MG/DL (ref 8.3–10.6)
CHLORIDE BLD-SCNC: 104 MMOL/L (ref 99–110)
CHLORIDE BLD-SCNC: 104 MMOL/L (ref 99–110)
CLARITY: CLEAR
CO2: 17 MMOL/L (ref 21–32)
CO2: 20 MMOL/L (ref 21–32)
COLOR: YELLOW
CREAT SERPL-MCNC: 1 MG/DL (ref 0.6–1.1)
CREAT SERPL-MCNC: 1 MG/DL (ref 0.6–1.1)
DIFFERENTIAL TYPE: ABNORMAL
DIFFERENTIAL TYPE: ABNORMAL
EOSINOPHILS ABSOLUTE: 0.3 K/CU MM
EOSINOPHILS ABSOLUTE: 0.4 K/CU MM
EOSINOPHILS RELATIVE PERCENT: 3.4 % (ref 0–3)
EOSINOPHILS RELATIVE PERCENT: 3.7 % (ref 0–3)
GFR AFRICAN AMERICAN: >60 ML/MIN/1.73M2
GFR AFRICAN AMERICAN: >60 ML/MIN/1.73M2
GFR NON-AFRICAN AMERICAN: 53 ML/MIN/1.73M2
GFR NON-AFRICAN AMERICAN: 53 ML/MIN/1.73M2
GLUCOSE BLD-MCNC: 110 MG/DL (ref 70–99)
GLUCOSE BLD-MCNC: 95 MG/DL (ref 70–99)
GLUCOSE, URINE: NEGATIVE MG/DL
HCT VFR BLD CALC: 31.8 % (ref 37–47)
HCT VFR BLD CALC: 34.4 % (ref 37–47)
HEMOGLOBIN: 10.5 GM/DL (ref 12.5–16)
HEMOGLOBIN: 9.8 GM/DL (ref 12.5–16)
IMMATURE NEUTROPHIL %: 0.4 % (ref 0–0.43)
IMMATURE NEUTROPHIL %: 0.6 % (ref 0–0.43)
KETONES, URINE: NEGATIVE MG/DL
LACTATE: 1.2 MMOL/L (ref 0.4–2)
LEUKOCYTE ESTERASE, URINE: ABNORMAL
LIPASE: 42 IU/L (ref 13–60)
LYMPHOCYTES ABSOLUTE: 1 K/CU MM
LYMPHOCYTES ABSOLUTE: 1 K/CU MM
LYMPHOCYTES RELATIVE PERCENT: 10.5 % (ref 24–44)
LYMPHOCYTES RELATIVE PERCENT: 9.5 % (ref 24–44)
MCH RBC QN AUTO: 28.6 PG (ref 27–31)
MCH RBC QN AUTO: 28.8 PG (ref 27–31)
MCHC RBC AUTO-ENTMCNC: 30.5 % (ref 32–36)
MCHC RBC AUTO-ENTMCNC: 30.8 % (ref 32–36)
MCV RBC AUTO: 92.7 FL (ref 78–100)
MCV RBC AUTO: 94.5 FL (ref 78–100)
MONOCYTES ABSOLUTE: 0.9 K/CU MM
MONOCYTES ABSOLUTE: 1.1 K/CU MM
MONOCYTES RELATIVE PERCENT: 10.5 % (ref 0–4)
MONOCYTES RELATIVE PERCENT: 9.6 % (ref 0–4)
MUCUS: ABNORMAL HPF
NITRITE URINE, QUANTITATIVE: NEGATIVE
NUCLEATED RBC %: 0 %
NUCLEATED RBC %: 0 %
PDW BLD-RTO: 15.9 % (ref 11.7–14.9)
PDW BLD-RTO: 15.9 % (ref 11.7–14.9)
PH, URINE: 5 (ref 5–8)
PLATELET # BLD: 253 K/CU MM (ref 140–440)
PLATELET # BLD: 263 K/CU MM (ref 140–440)
PMV BLD AUTO: 10 FL (ref 7.5–11.1)
PMV BLD AUTO: 10.2 FL (ref 7.5–11.1)
POTASSIUM SERPL-SCNC: 3.7 MMOL/L (ref 3.5–5.1)
POTASSIUM SERPL-SCNC: 3.9 MMOL/L (ref 3.5–5.1)
PRO-BNP: ABNORMAL PG/ML
PROTEIN UA: NEGATIVE MG/DL
RBC # BLD: 3.43 M/CU MM (ref 4.2–5.4)
RBC # BLD: 3.64 M/CU MM (ref 4.2–5.4)
RBC URINE: <1 /HPF (ref 0–6)
SEGMENTED NEUTROPHILS ABSOLUTE COUNT: 7.2 K/CU MM
SEGMENTED NEUTROPHILS ABSOLUTE COUNT: 7.8 K/CU MM
SEGMENTED NEUTROPHILS RELATIVE PERCENT: 75.1 % (ref 36–66)
SEGMENTED NEUTROPHILS RELATIVE PERCENT: 75.4 % (ref 36–66)
SODIUM BLD-SCNC: 134 MMOL/L (ref 135–145)
SODIUM BLD-SCNC: 135 MMOL/L (ref 135–145)
SPECIFIC GRAVITY UA: 1.02 (ref 1–1.03)
SQUAMOUS EPITHELIAL: 1 /HPF
TOTAL IMMATURE NEUTOROPHIL: 0.04 K/CU MM
TOTAL IMMATURE NEUTOROPHIL: 0.06 K/CU MM
TOTAL NUCLEATED RBC: 0 K/CU MM
TOTAL NUCLEATED RBC: 0 K/CU MM
TOTAL PROTEIN: 5.2 GM/DL (ref 6.4–8.2)
TRICHOMONAS: ABNORMAL /HPF
TROPONIN T: 0.06 NG/ML
UROBILINOGEN, URINE: NEGATIVE MG/DL (ref 0.2–1)
WBC # BLD: 10.4 K/CU MM (ref 4–10.5)
WBC # BLD: 9.6 K/CU MM (ref 4–10.5)
WBC UA: 1 /HPF (ref 0–5)

## 2021-05-31 PROCEDURE — 1200000000 HC SEMI PRIVATE

## 2021-05-31 PROCEDURE — 96360 HYDRATION IV INFUSION INIT: CPT

## 2021-05-31 PROCEDURE — 2580000003 HC RX 258: Performed by: EMERGENCY MEDICINE

## 2021-05-31 PROCEDURE — 36415 COLL VENOUS BLD VENIPUNCTURE: CPT

## 2021-05-31 PROCEDURE — 83880 ASSAY OF NATRIURETIC PEPTIDE: CPT

## 2021-05-31 PROCEDURE — 84484 ASSAY OF TROPONIN QUANT: CPT

## 2021-05-31 PROCEDURE — 80048 BASIC METABOLIC PNL TOTAL CA: CPT

## 2021-05-31 PROCEDURE — 81001 URINALYSIS AUTO W/SCOPE: CPT

## 2021-05-31 PROCEDURE — 71045 X-RAY EXAM CHEST 1 VIEW: CPT

## 2021-05-31 PROCEDURE — 74177 CT ABD & PELVIS W/CONTRAST: CPT

## 2021-05-31 PROCEDURE — 83605 ASSAY OF LACTIC ACID: CPT

## 2021-05-31 PROCEDURE — 85025 COMPLETE CBC W/AUTO DIFF WBC: CPT

## 2021-05-31 PROCEDURE — 70450 CT HEAD/BRAIN W/O DYE: CPT

## 2021-05-31 PROCEDURE — 83690 ASSAY OF LIPASE: CPT

## 2021-05-31 PROCEDURE — 6360000004 HC RX CONTRAST MEDICATION: Performed by: EMERGENCY MEDICINE

## 2021-05-31 PROCEDURE — 80053 COMPREHEN METABOLIC PANEL: CPT

## 2021-05-31 PROCEDURE — 99285 EMERGENCY DEPT VISIT HI MDM: CPT

## 2021-05-31 PROCEDURE — 87040 BLOOD CULTURE FOR BACTERIA: CPT

## 2021-05-31 PROCEDURE — 70460 CT HEAD/BRAIN W/DYE: CPT

## 2021-05-31 RX ORDER — 0.9 % SODIUM CHLORIDE 0.9 %
500 INTRAVENOUS SOLUTION INTRAVENOUS ONCE
Status: COMPLETED | OUTPATIENT
Start: 2021-05-31 | End: 2021-05-31

## 2021-05-31 RX ORDER — MIDODRINE HYDROCHLORIDE 5 MG/1
5 TABLET ORAL ONCE
Status: DISCONTINUED | OUTPATIENT
Start: 2021-05-31 | End: 2021-06-01

## 2021-05-31 RX ORDER — SODIUM CHLORIDE 9 MG/ML
INJECTION, SOLUTION INTRAVENOUS CONTINUOUS
Status: DISCONTINUED | OUTPATIENT
Start: 2021-05-31 | End: 2021-06-01

## 2021-05-31 RX ADMIN — IOPAMIDOL 75 ML: 755 INJECTION, SOLUTION INTRAVENOUS at 17:50

## 2021-05-31 RX ADMIN — SODIUM CHLORIDE 500 ML: 9 INJECTION, SOLUTION INTRAVENOUS at 17:28

## 2021-05-31 ASSESSMENT — ENCOUNTER SYMPTOMS
SORE THROAT: 0
COUGH: 0
RHINORRHEA: 0
EYE DISCHARGE: 0
SHORTNESS OF BREATH: 0
EYE PAIN: 0
BACK PAIN: 0
ABDOMINAL PAIN: 1
CONSTIPATION: 1

## 2021-05-31 NOTE — ED NOTES
Pt to ED via [facility] from [facility] with c/o AMS. Pt is alert and oriented x 4 upon arrival.     Pt does have abdominal distention, states has been constipated recently. PER paperwork from [facility] home, pt has had increased confusion with hallucinations, generalized weakness, decreased appetite, abdominal distention and decreased bowel sounds.       Lester Smith RN  05/31/21 3050

## 2021-05-31 NOTE — ED PROVIDER NOTES
[address] ENCOUNTER      Pt Name: Antonio Pereira  MRN: [id number]  [place]fs[place] 1940  Date of evaluation: 5/31/2021  Provider: Mushtaq Helm MD    CHIEF COMPLAINT       Chief Complaint   Patient presents with   Durkee Bryan    Constipation    Altered Mental Status     report of AMS from nursing home but pt is alert and oriented x 4         HISTORY OF PRESENT ILLNESS      Antonio Pereira is a 78 y.o. female who presents to the emergency department  for   Chief Complaint   Patient presents with    Bloated    Constipation    Altered Mental Status     report of AMS from nursing home but pt is alert and oriented x 3      78-year-old female presents from her facility for abdominal distention, pain and constipation. There is also report that she has had some hallucinations and been altered. She presents to the emergency department alert and oriented answer questions appropriately. No signs of hallucinations or that she is attending to unseen or unknown stimuli. She does endorse a distended and diffusely tender abdomen. She states she had a small bowel movement yesterday. She does however report that she is not had a significant bowel movement in few days. She denies any chest pain. She does endorse some intermittent shortness of breath. Denies any injury to her abdomen. She does endorse remote history of cholecystectomy. Denies any sputum production. No fever or chills. She is answering questions appropriately emergency department. She is moving all extremities. She reportedly had some labs in possibly some imaging done out patient did have some abnormalities. We did not have those labs or imaging results available in the emergency department. Patient son was available in the emergency department for collateral information. He endorsed that around 1:00 in the afternoon patient was very confused and hallucinating. She has had no hallucinations in the emergency department. Nursing Notes, Triage Notes & Vital Signs were reviewed. REVIEW OF SYSTEMS    (2-9 systems for level 4, 10 or more for level 5)     Review of Systems   Constitutional: Negative for chills and fever. HENT: Negative for congestion, rhinorrhea and sore throat. Eyes: Negative for pain and discharge. Respiratory: Negative for cough and shortness of breath. Cardiovascular: Negative for chest pain and palpitations. Gastrointestinal: Positive for abdominal pain and constipation. Endocrine: Negative for polydipsia and polyuria. Genitourinary: Negative for dysuria and flank pain. Musculoskeletal: Negative for back pain and neck pain. Skin: Negative for pallor and wound. Neurological: Negative for dizziness, facial asymmetry, light-headedness, numbness and headaches. Psychiatric/Behavioral: Negative for confusion. Except as noted above the remainder of the review of systems was reviewed and negative.        PAST MEDICAL HISTORY     Past Medical History:   Diagnosis Date    Family history of abdominal aortic aneurysm     CT Abd Aorta 10/2007 - normal aorta    Family history of colon cancer     Colonoscopy due 1/2013    Fracture of left humerus 3/2014    [place]    Hypertension     Hypothyroidism, postsurgical 1997    Left Thyroid Lobectomy    Lumbar Spinal Stenosis 2006    severe    Multinodular goiter 1996    suppresion and bx negative; s/p thyroid lobectomy    VILLANUEVA (nonalcoholic steatohepatitis) 2005    villanueva vs cirrhosis;  Dr. Natalie Zuniga Necrobiosis lipoidica diabeticorum ([facility] [address]     Obesity, Class II, BMI 35-39.9, with comorbidity     Osteoporosis 1/2015    FRAX 5.5 & 17%    Polymyalgia rheumatica ([facility] [address] 1/2012    Dr Catalan Doctor R; released her 3/2014    RBBB 02/2017    new finding    Type II Diabetes Mellitus 1994    per pt on 3/14/2019\"they do not consider me diabetic anymore- took me off medication long time ago\"  Urolithiasis     once       Prior to Admission medications    Medication Sig Start Date End Date Taking? Authorizing Provider   metoprolol succinate (TOPROL XL) 25 MG extended release tablet Take 0.5 tablets by mouth daily 3/30/21   Aldo Weir MD   furosemide (LASIX) 20 MG tablet Take 1 tablet by mouth daily 3/30/21   Aldo Weir MD   alendronate (FOSAMAX) 70 MG tablet Take 1 tablet by mouth every 7 days  Patient taking differently: Take 70 mg by mouth every 7 days Mondays 2/18/21   Aldo Weir MD   Handicap Placard MISC by Does not apply route Good for 5 years 2/18/21   Aldo Weir MD   levothyroxine (SYNTHROID) 100 MCG tablet Take 1 tablet by mouth daily 2/8/21   Aldo Weir MD   Cholecalciferol (VITAMIN D) 2000 UNITS CAPS capsule Take 1 capsule by mouth daily. Historical Provider, MD        Patient Active Problem List   Diagnosis    Type II Diabetes Mellitus    VILLANUEVA (nonalcoholic steatohepatitis)    Hypertension    Multinodular goiter    Necrobiosis lipoidica diabeticorum (HCC)    Family history of abdominal aortic aneurysm    Lumbar Spinal Stenosis    Proximal humerus fracture    Osteoporosis    Morbidly obese (HCC)    Hypothyroidism, postsurgical    Urolithiasis    Other hyperlipidemia    Acute cystitis without hematuria    General weakness    Chronic heart failure with preserved ejection fraction ([facility] [address]         SURGICAL HISTORY       Past Surgical History:   Procedure Laterality Date    ANUS SURGERY  ? when    Anal Fissure Repair    CARPAL TUNNEL RELEASE Right 3/18/2019    RIGHT CARPAL [facility] [address] [facility]    CHOLECYSTECTOMY, [address] [facility]  last one 5+ ys ago     ELBOW FRACTURE SURGERY Left 1/2011    ORIF    KNEE ARTHROSCOPY Right 2008    ORIF HUMERUS DECOMPRESSION Right 12/2006    right humeral fracture    SHOULDER sensory deficit. Comments: Moving all extremities; answering questions appropriately         DIAGNOSTIC RESULTS     Labs Reviewed   COMPREHENSIVE METABOLIC PANEL W/ REFLEX TO MG FOR LOW K - Abnormal; Notable for the following components:       Result Value    Sodium 134 (*)     CO2 20 (*)     BUN 68 (*)     Glucose 110 (*)     Calcium 8.0 (*)     Albumin 2.9 (*)     Total Protein 5.2 (*)     ALT 9 (*)     AST 10 (*)     GFR Non- 53 (*)     All other components within normal limits   CBC WITH AUTO DIFFERENTIAL - Abnormal; Notable for the following components:    RBC 3.43 (*)     Hemoglobin 9.8 (*)     Hematocrit 31.8 (*)     MCHC 30.8 (*)     RDW 15.9 (*)     Segs Relative 75.4 (*)     Lymphocytes % 10.5 (*)     Monocytes % 9.6 (*)     Eosinophils % 3.4 (*)     All other components within normal limits   TROPONIN - Abnormal; Notable for the following components:    Troponin T 0.064 (*)     All other components within normal limits   BRAIN NATRIURETIC PEPTIDE - Abnormal; Notable for the following components:    Pro-BNP 29,761 (*)     All other components within normal limits   URINE RT REFLEX TO CULTURE - Abnormal; Notable for the following components:    Leukocyte Esterase, Urine TRACE (*)     Mucus, UA RARE (*)     All other components within normal limits   CULTURE, BLOOD 1   CULTURE, BLOOD 2   LIPASE   LACTIC ACID, PLASMA        RADIOLOGY:     Non-plain film images such as CT, Ultrasound and MRI are read by the radiologist. Plain radiographic images are visualized and preliminarily interpreted by the emergency physician. Interpretation per the Radiologist below, if available at the time of this note:    CT HEAD WO CONTRAST   Final Result   1. No acute intracranial abnormality. 2. Stable mild-to-moderate chronic white matter microvascular ischemic   changes. XR CHEST PORTABLE   Final Result   New small layering right pleural effusion with right lung base atelectasis.          CT ABDOMEN PELVIS W IV CONTRAST Additional Contrast? None   Final Result   1. Large amount of stool within the distal colon and rectum with mild wall   thickening and adjacent stranding, concerning for constipation and stercoral   colitis. 2. Urinary bladder distention with bilateral hydroureteronephrosis. There is   no nephrolithiasis. Correlation for any signs or symptoms of bladder outlet   obstruction is recommended. 3. Nodular hepatic contour, suggestive of cirrhosis. Small ascites. .   4. At least moderate bilateral pleural effusions with adjacent consolidation,   likely atelectasis. 5. 1.1 cm indeterminate left adrenal nodule. Adrenal protocol MRI or CT in 1   year could provide further information as clinically indicated.                ED BEDSIDE ULTRASOUND:   Performed by ED Physician Claudia Venegas MD       LABS:  Labs Reviewed   COMPREHENSIVE METABOLIC PANEL W/ REFLEX TO MG FOR LOW K - Abnormal; Notable for the following components:       Result Value    Sodium 134 (*)     CO2 20 (*)     BUN 68 (*)     Glucose 110 (*)     Calcium 8.0 (*)     Albumin 2.9 (*)     Total Protein 5.2 (*)     ALT 9 (*)     AST 10 (*)     GFR Non- 53 (*)     All other components within normal limits   CBC WITH AUTO DIFFERENTIAL - Abnormal; Notable for the following components:    RBC 3.43 (*)     Hemoglobin 9.8 (*)     Hematocrit 31.8 (*)     MCHC 30.8 (*)     RDW 15.9 (*)     Segs Relative 75.4 (*)     Lymphocytes % 10.5 (*)     Monocytes % 9.6 (*)     Eosinophils % 3.4 (*)     All other components within normal limits   TROPONIN - Abnormal; Notable for the following components:    Troponin T 0.064 (*)     All other components within normal limits   BRAIN NATRIURETIC PEPTIDE - Abnormal; Notable for the following components:    Pro-BNP 29,761 (*)     All other components within normal limits   URINE RT REFLEX TO CULTURE - Abnormal; Notable for the following components:    Leukocyte Esterase, Urine discussed with patient. Family is concerned about this episode of hallucinations that she had today. Her CAT scan of her head was overall nonacute. No significant findings on labs or imaging to suspect acute infection. She was given fluids in the emergency department after a fluid bolus, her blood pressure did improve. She will be admitted for further monitoring of her blood pressure as well as her mental status. She may also require bowel regimen. She and her son are agreeable plan of care. She is admitted in stable condition. For admission we did have another low blood pressure with a systolic of 85 it was charted. She was started on a maintenance rate of fluid. I did discuss this with the hospitalist.  At this time will defer any decision about starting empiric antibiotics. Her chest x-ray and urinalysis and abdominal CT scan the emergency department and identify any acute infectious etiologies. Amount and/or Complexity of Data Reviewed  Clinical lab tests: reviewed  Tests in the radiology section of CPT®: reviewed  Decide to obtain previous medical records or to obtain history from someone other than the patient: yes        -  Patient seen and evaluated in the emergency department. -  Triage and nursing notes reviewed and incorporated. -  Old chart records reviewed and incorporated. -  Work-up included:  See above  -  Results discussed with patient. CONSULTS:  IP CONSULT TO HOSPITALIST    PROCEDURES:  None performed unless otherwise noted below     Procedures        FINAL IMPRESSION      1. Altered mental status, unspecified altered mental status type    2. Constipation, unspecified constipation type    3. Hypotension, unspecified hypotension type          DISPOSITION/PLAN   DISPOSITION        PATIENT REFERRED TO:  No follow-up provider specified.     DISCHARGE MEDICATIONS:  New Prescriptions    No medications on file       ED Provider Disposition Time  DISPOSITION        Appropriate personal protective equipment was worn during the patient's evaluation. These included surgical, eye protection, surgical mask or in 95 respirator and gloves. The patient was also placed in a surgical mask for the prevention of possible spread of respiratory viral illnesses. The Patient was instructed to read the package inserts with any medication that was prescribed. Major potential reactions and medication interactions were discussed. The Patient understands that there are numerous possible adverse reactions not covered. The patient was also instructed to arrange follow-up with his or her primary care provider for review of any pending labwork or incidental findings on any radiology results that were obtained. All efforts were made to discuss any incidental findings that require further monitoring. Controlled Substances Monitoring:     No flowsheet data found.     (Please note that portions of this note were completed with a voice recognition program.  Efforts were made to edit the dictations but occasionally words are mis-transcribed.)    Daisy Ríos MD (electronically signed)  Attending Emergency Physician           Daisy Ríos MD  05/31/21 [id number]       Daisy Ríos MD  05/31/21 [id number]

## 2021-06-01 PROBLEM — N13.30 HYDROURETERONEPHROSIS: Status: ACTIVE | Noted: 2021-05-31

## 2021-06-01 LAB
ALBUMIN SERPL-MCNC: 2.8 GM/DL (ref 3.4–5)
ALP BLD-CCNC: 70 IU/L (ref 40–128)
ALT SERPL-CCNC: 8 U/L (ref 10–40)
ANION GAP SERPL CALCULATED.3IONS-SCNC: 14 MMOL/L (ref 4–16)
AST SERPL-CCNC: 10 IU/L (ref 15–37)
BILIRUB SERPL-MCNC: 0.5 MG/DL (ref 0–1)
BUN BLDV-MCNC: 62 MG/DL (ref 6–23)
CALCIUM SERPL-MCNC: 7.8 MG/DL (ref 8.3–10.6)
CHLORIDE BLD-SCNC: 109 MMOL/L (ref 99–110)
CO2: 18 MMOL/L (ref 21–32)
CREAT SERPL-MCNC: 0.9 MG/DL (ref 0.6–1.1)
CREATININE URINE: 54.8 MG/DL (ref 28–217)
GFR AFRICAN AMERICAN: >60 ML/MIN/1.73M2
GFR NON-AFRICAN AMERICAN: 60 ML/MIN/1.73M2
GLUCOSE BLD-MCNC: 104 MG/DL (ref 70–99)
HCT VFR BLD CALC: 34.8 % (ref 37–47)
HEMOGLOBIN: 10.5 GM/DL (ref 12.5–16)
LACTATE: 1.9 MMOL/L (ref 0.4–2)
MCH RBC QN AUTO: 29.9 PG (ref 27–31)
MCHC RBC AUTO-ENTMCNC: 30.2 % (ref 32–36)
MCV RBC AUTO: 99.1 FL (ref 78–100)
PDW BLD-RTO: 15.9 % (ref 11.7–14.9)
PLATELET # BLD: 253 K/CU MM (ref 140–440)
PMV BLD AUTO: 9.6 FL (ref 7.5–11.1)
POTASSIUM SERPL-SCNC: 3.9 MMOL/L (ref 3.5–5.1)
RBC # BLD: 3.51 M/CU MM (ref 4.2–5.4)
SODIUM BLD-SCNC: 141 MMOL/L (ref 135–145)
SODIUM URINE: 10 MMOL/L (ref 35–167)
TOTAL PROTEIN: 4.5 GM/DL (ref 6.4–8.2)
TROPONIN T: 0.05 NG/ML
TROPONIN T: 0.05 NG/ML
WBC # BLD: 13.4 K/CU MM (ref 4–10.5)

## 2021-06-01 PROCEDURE — 94761 N-INVAS EAR/PLS OXIMETRY MLT: CPT

## 2021-06-01 PROCEDURE — 85045 AUTOMATED RETICULOCYTE COUNT: CPT

## 2021-06-01 PROCEDURE — 83550 IRON BINDING TEST: CPT

## 2021-06-01 PROCEDURE — 6360000002 HC RX W HCPCS: Performed by: INTERNAL MEDICINE

## 2021-06-01 PROCEDURE — 36415 COLL VENOUS BLD VENIPUNCTURE: CPT

## 2021-06-01 PROCEDURE — 97163 PT EVAL HIGH COMPLEX 45 MIN: CPT

## 2021-06-01 PROCEDURE — 84300 ASSAY OF URINE SODIUM: CPT

## 2021-06-01 PROCEDURE — 2580000003 HC RX 258: Performed by: INTERNAL MEDICINE

## 2021-06-01 PROCEDURE — 85027 COMPLETE CBC AUTOMATED: CPT

## 2021-06-01 PROCEDURE — 82746 ASSAY OF FOLIC ACID SERUM: CPT

## 2021-06-01 PROCEDURE — 97530 THERAPEUTIC ACTIVITIES: CPT

## 2021-06-01 PROCEDURE — 82570 ASSAY OF URINE CREATININE: CPT

## 2021-06-01 PROCEDURE — 1200000000 HC SEMI PRIVATE

## 2021-06-01 PROCEDURE — 6370000000 HC RX 637 (ALT 250 FOR IP): Performed by: INTERNAL MEDICINE

## 2021-06-01 PROCEDURE — 84484 ASSAY OF TROPONIN QUANT: CPT

## 2021-06-01 PROCEDURE — 6360000002 HC RX W HCPCS: Performed by: STUDENT IN AN ORGANIZED HEALTH CARE EDUCATION/TRAINING PROGRAM

## 2021-06-01 PROCEDURE — 82728 ASSAY OF FERRITIN: CPT

## 2021-06-01 PROCEDURE — 83605 ASSAY OF LACTIC ACID: CPT

## 2021-06-01 PROCEDURE — 80053 COMPREHEN METABOLIC PANEL: CPT

## 2021-06-01 PROCEDURE — 85025 COMPLETE CBC W/AUTO DIFF WBC: CPT

## 2021-06-01 PROCEDURE — 83540 ASSAY OF IRON: CPT

## 2021-06-01 PROCEDURE — 6370000000 HC RX 637 (ALT 250 FOR IP): Performed by: STUDENT IN AN ORGANIZED HEALTH CARE EDUCATION/TRAINING PROGRAM

## 2021-06-01 PROCEDURE — 83935 ASSAY OF URINE OSMOLALITY: CPT

## 2021-06-01 PROCEDURE — 99221 1ST HOSP IP/OBS SF/LOW 40: CPT | Performed by: INTERNAL MEDICINE

## 2021-06-01 PROCEDURE — 82607 VITAMIN B-12: CPT

## 2021-06-01 PROCEDURE — 2580000003 HC RX 258: Performed by: STUDENT IN AN ORGANIZED HEALTH CARE EDUCATION/TRAINING PROGRAM

## 2021-06-01 PROCEDURE — 97166 OT EVAL MOD COMPLEX 45 MIN: CPT

## 2021-06-01 RX ORDER — ACETAMINOPHEN 650 MG/1
650 SUPPOSITORY RECTAL EVERY 6 HOURS PRN
Status: DISCONTINUED | OUTPATIENT
Start: 2021-06-01 | End: 2021-06-05 | Stop reason: HOSPADM

## 2021-06-01 RX ORDER — LEVOTHYROXINE SODIUM 0.1 MG/1
100 TABLET ORAL DAILY
Status: DISCONTINUED | OUTPATIENT
Start: 2021-06-01 | End: 2021-06-05 | Stop reason: HOSPADM

## 2021-06-01 RX ORDER — TORSEMIDE 20 MG/1
20 TABLET ORAL 2 TIMES DAILY
Status: DISCONTINUED | OUTPATIENT
Start: 2021-06-01 | End: 2021-06-02

## 2021-06-01 RX ORDER — ACETAMINOPHEN 325 MG/1
650 TABLET ORAL EVERY 6 HOURS PRN
Status: DISCONTINUED | OUTPATIENT
Start: 2021-06-01 | End: 2021-06-05 | Stop reason: HOSPADM

## 2021-06-01 RX ORDER — SODIUM CHLORIDE 0.9 % (FLUSH) 0.9 %
5-40 SYRINGE (ML) INJECTION PRN
Status: DISCONTINUED | OUTPATIENT
Start: 2021-06-01 | End: 2021-06-05 | Stop reason: HOSPADM

## 2021-06-01 RX ORDER — SODIUM CHLORIDE 9 MG/ML
25 INJECTION, SOLUTION INTRAVENOUS PRN
Status: DISCONTINUED | OUTPATIENT
Start: 2021-06-01 | End: 2021-06-05 | Stop reason: HOSPADM

## 2021-06-01 RX ORDER — MIDODRINE HYDROCHLORIDE 5 MG/1
10 TABLET ORAL
Status: DISCONTINUED | OUTPATIENT
Start: 2021-06-01 | End: 2021-06-05 | Stop reason: HOSPADM

## 2021-06-01 RX ORDER — ONDANSETRON 2 MG/ML
4 INJECTION INTRAMUSCULAR; INTRAVENOUS EVERY 6 HOURS PRN
Status: DISCONTINUED | OUTPATIENT
Start: 2021-06-01 | End: 2021-06-05 | Stop reason: HOSPADM

## 2021-06-01 RX ORDER — SODIUM PHOSPHATE, DIBASIC AND SODIUM PHOSPHATE, MONOBASIC 7; 19 G/133ML; G/133ML
1 ENEMA RECTAL DAILY PRN
Status: DISCONTINUED | OUTPATIENT
Start: 2021-06-01 | End: 2021-06-05 | Stop reason: HOSPADM

## 2021-06-01 RX ORDER — ASPIRIN 325 MG
325 TABLET, DELAYED RELEASE (ENTERIC COATED) ORAL DAILY
Status: DISCONTINUED | OUTPATIENT
Start: 2021-06-01 | End: 2021-06-05 | Stop reason: HOSPADM

## 2021-06-01 RX ORDER — POLYETHYLENE GLYCOL 3350 17 G/17G
17 POWDER, FOR SOLUTION ORAL 2 TIMES DAILY
Status: DISCONTINUED | OUTPATIENT
Start: 2021-06-01 | End: 2021-06-05 | Stop reason: HOSPADM

## 2021-06-01 RX ORDER — SODIUM CHLORIDE 0.9 % (FLUSH) 0.9 %
5-40 SYRINGE (ML) INJECTION EVERY 12 HOURS SCHEDULED
Status: DISCONTINUED | OUTPATIENT
Start: 2021-06-01 | End: 2021-06-05 | Stop reason: HOSPADM

## 2021-06-01 RX ORDER — PROMETHAZINE HYDROCHLORIDE 25 MG/1
12.5 TABLET ORAL EVERY 6 HOURS PRN
Status: DISCONTINUED | OUTPATIENT
Start: 2021-06-01 | End: 2021-06-05 | Stop reason: HOSPADM

## 2021-06-01 RX ORDER — SENNA AND DOCUSATE SODIUM 50; 8.6 MG/1; MG/1
1 TABLET, FILM COATED ORAL 2 TIMES DAILY
Status: DISCONTINUED | OUTPATIENT
Start: 2021-06-01 | End: 2021-06-05 | Stop reason: HOSPADM

## 2021-06-01 RX ADMIN — MIDODRINE HYDROCHLORIDE 10 MG: 5 TABLET ORAL at 16:35

## 2021-06-01 RX ADMIN — DOCUSATE SODIUM 50 MG AND SENNOSIDES 8.6 MG 1 TABLET: 8.6; 5 TABLET, FILM COATED ORAL at 09:16

## 2021-06-01 RX ADMIN — ENOXAPARIN SODIUM 40 MG: 40 INJECTION SUBCUTANEOUS at 09:16

## 2021-06-01 RX ADMIN — TORSEMIDE 20 MG: 20 TABLET ORAL at 17:26

## 2021-06-01 RX ADMIN — SODIUM CHLORIDE: 9 INJECTION, SOLUTION INTRAVENOUS at 03:47

## 2021-06-01 RX ADMIN — POLYETHYLENE GLYCOL (3350) 17 G: 17 POWDER, FOR SOLUTION ORAL at 09:17

## 2021-06-01 RX ADMIN — CEFTRIAXONE 1000 MG: 1 INJECTION, POWDER, FOR SOLUTION INTRAMUSCULAR; INTRAVENOUS at 14:55

## 2021-06-01 RX ADMIN — ASPIRIN 325 MG: 325 TABLET, COATED ORAL at 11:37

## 2021-06-01 RX ADMIN — SODIUM CHLORIDE, PRESERVATIVE FREE 10 ML: 5 INJECTION INTRAVENOUS at 19:45

## 2021-06-01 RX ADMIN — SODIUM CHLORIDE: 9 INJECTION, SOLUTION INTRAVENOUS at 11:39

## 2021-06-01 ASSESSMENT — ENCOUNTER SYMPTOMS
BLOOD IN STOOL: 0
SHORTNESS OF BREATH: 0
SORE THROAT: 0
ABDOMINAL PAIN: 1
ABDOMINAL DISTENTION: 1
ANAL BLEEDING: 0
WHEEZING: 0
VOMITING: 0
COUGH: 0
CONSTIPATION: 1
DIARRHEA: 0
COLOR CHANGE: 0
NAUSEA: 0
RHINORRHEA: 0
BACK PAIN: 0
CHEST TIGHTNESS: 0

## 2021-06-01 ASSESSMENT — PAIN SCALES - GENERAL
PAINLEVEL_OUTOF10: 0

## 2021-06-01 NOTE — PROGRESS NOTES
I had a visit with the patient as well as her son who is her power of . This visit is separate from my progress note earlier. The reason for this was to discuss her goals of care and CODE STATUS in plight of progressive weakness and decline in her condition. CODE STATUS is listed currently in the EMR is full code. In light of the patient's age, declining overall health and quality of life, multiple falls. Recent hospitalizations and lack of improvement. We discussed what her wishes would be in case of decline and cardiac arrest.  The patient and the son made it clear they do not wish for resuscitation in case of arrest.  In addition they also wish to pursue a rather conservative approach to her overall condition and avoid aggressive care or testing. At this time they are agreeable to treatment with IV fluids, antibiotics, medications. They would like consultation with palliative care to help guide that conversation and help with her management post discharge. They wish for her to be taken home if possible however the son does not seem to be able to provide 24-hour care at home. At this time CODE STATUS changed to DNR. Consultation with palliative care is ordered. Continue current management with IV fluids and antibiotics. 16 minutes was spent with the patient on this evaluation today.

## 2021-06-01 NOTE — CONSULTS
CARDIOLOGY CONSULT NOTE   Reason for consultation:  ELEVATED TROP    Referring physician:  Stepan Dill DO     Primary care physician: Sierra Reyes MD      Dear  Stepan Dill DO   Thanks for the consult. History of present illness: [facility] is a 78 y. o.year old who  presents with abdominal pain and altered mental status, found to have hydrouretero nephrosis and cardiology consult is called for elevated trop which is not positive, patient cannot contribute to any history and all information were  Obtained after review of medical records. Chief Complaint   Patient presents with    Bloated    Constipation    Altered Mental Status     report of AMS from nursing home but pt is alert and oriented x 4     Blood pressure, cholesterol, blood glucose and weight are well controlled. Past medical history:    has a past medical history of Family history of abdominal aortic aneurysm, Family history of colon cancer, Fracture of left humerus, Hypertension, Hypothyroidism, postsurgical, Lumbar Spinal Stenosis, Multinodular goiter, VILLANUEVA (nonalcoholic steatohepatitis), Necrobiosis lipoidica diabeticorum ([facility] [address] Obesity, Class II, BMI 35-39.9, with comorbidity, Osteoporosis, Polymyalgia rheumatica ([address] RBBB, Type II Diabetes Mellitus, and Urolithiasis. Past surgical history:   has a past surgical history that includes mane and bso (cervix removed); Cholecystectomy, laparoscopic (1996); Thyroid lobectomy (Left, 1995); orif humerus decompression (Right, 12/2006); Shoulder arthroscopy (Right, ?when); Anus surgery (?when); Knee arthroscopy (Right, 2008); Elbow fracture surgery (Left, 1/2011); Cataract removal with implant (Bilateral, 10+ yrs ago); Colonoscopy (last one 5+ ys ago ); Tonsillectomy (as a child); and Carpal tunnel release (Right, 3/18/2019). Social History:   reports that she has never smoked. She has never used smokeless tobacco. She reports that she does not drink alcohol and does not use drugs.   Family history:   no family history of CAD, STROKE of DM    Allergies   Allergen Reactions    Penicillins Rash       levothyroxine (SYNTHROID) tablet 100 mcg, Daily  sodium chloride flush 0.9 % injection 5-40 mL, 2 times per day  sodium chloride flush 0.9 % injection 5-40 mL, PRN  0.9 % sodium chloride infusion, PRN  enoxaparin (LOVENOX) injection 40 mg, Daily  promethazine (PHENERGAN) tablet 12.5 mg, Q6H PRN   Or  ondansetron (ZOFRAN) injection 4 mg, Q6H PRN  acetaminophen (TYLENOL) tablet 650 mg, Q6H PRN   Or  acetaminophen (TYLENOL) suppository 650 mg, Q6H PRN  sennosides-docusate sodium (SENOKOT-S) 8.6-50 MG tablet 1 tablet, BID  fleet rectal enema 1 enema, Daily PRN  polyethylene glycol (GLYCOLAX) packet 17 g, BID  0.9 % sodium chloride infusion, Continuous  midodrine (PROAMATINE) tablet 5 mg, Once      Current Facility-Administered Medications   Medication Dose Route Frequency Provider Last Rate Last Admin    levothyroxine (SYNTHROID) tablet 100 mcg  100 mcg Oral Daily [facility], DO        sodium chloride flush 0.9 % injection 5-40 mL  5-40 mL Intravenous 2 times per day [facility], DO        sodium chloride flush 0.9 % injection 5-40 mL  5-40 mL Intravenous PRN Giovanni Bobby, DO        0.9 % sodium chloride infusion  25 mL Intravenous PRN [facility], DO        enoxaparin (LOVENOX) injection 40 mg  40 mg Subcutaneous Daily [facility], DO   40 mg at 06/01/21 0916    promethazine (PHENERGAN) tablet 12.5 mg  12.5 mg Oral Q6H PRN [facility], DO        Or    ondansetron (ZOFRAN) injection 4 mg  4 mg Intravenous Q6H PRN [facility], DO        acetaminophen (TYLENOL) tablet 650 mg  650 mg Oral Q6H PRN [facility], DO        Or    acetaminophen (TYLENOL) suppository 650 mg  650 mg Rectal Q6H PRN [facility], DO        sennosides-docusate sodium (SENOKOT-S) 8.6-50 MG tablet 1 tablet  1 tablet Oral BID [facility], DO   1 tablet at 06/01/21 0916    fleet rectal enema 1 enema  1 enema Rectal Daily PRN [facility], DO        polyethylene glycol (GLYCOLAX) packet 17 g  17 g Oral BID Keith Wilkinson MD   17 g at 06/01/21 0917    0.9 % sodium chloride infusion   Intravenous Continuous [facility],  mL/hr at 06/01/21 0347 New Bag at 06/01/21 0347    midodrine (PROAMATINE) tablet 5 mg  5 mg Oral Once [facility], DO         Review of Systems:   · Constitutional: No Fever or Weight Loss   · Eyes: No Decreased Vision  · ENT: No Headaches, Hearing Loss or Vertigo  · Cardiovascular: No chest pain, dyspnea on exertion, palpitations or loss of consciousness  · Respiratory: No cough or wheezing    · Gastrointestinal: No abdominal pain, appetite loss, blood in stools, constipation, diarrhea or heartburn  · Genitourinary: No dysuria, trouble voiding, or hematuria  · Musculoskeletal:  No gait disturbance, weakness or joint complaints  · Integumentary: No rash or pruritis  · Neurological: No TIA or stroke symptoms  · Psychiatric: No anxiety or depression  · Endocrine: No malaise, fatigue or temperature intolerance  · Hematologic/Lymphatic: No bleeding problems, blood clots or swollen lymph nodes  · Allergic/Immunologic: No nasal congestion or hives  All systems negative except as marked. ·   ·      Physical Examination:    Vitals:    06/01/21 0915   BP: 86/66   Pulse: 106   Resp: 24   Temp: 98 °F (36.7 °C)   SpO2: 96%      Wt Readings from Last 3 Encounters:   06/01/21 197 lb 3.2 oz (89.4 kg)   04/24/21 179 lb (81.2 kg)   04/02/21 178 lb 12.8 oz (81.1 kg)     Body mass index is 36.07 kg/m². General Appearance:  No distress, conversant    Constitutional:  Well developed, Well nourished, No acute distress, Non-toxic appearance.    HENT:  Normocephalic, Atraumatic, Bilateral external ears normal, Oropharynx moist, No oral exudates, Nose normal. Neck- Normal range of motion, No tenderness, Supple, No stridor,no apical-carotid delay, no carotid bruit  Eyes:  PERRL, EOMI, Conjunctiva normal, No discharge. Respiratory:  Normal breath sounds, No respiratory distress, No wheezing, No chest tenderness. ,no use of accessory muscles, diaphragm movement is normal  Cardiovascular: (PMI) apex non displaced,no lifts no thrills, no s3,no s4, Normal heart rate, Normal rhythm, No murmurs, No rubs, No gallops. Carotid arteries pulse and amplitude are normal no bruit, no abdominal bruit noted ( normal abdominal aorta ausculation), femoral arteries pulse and amplitude are normal no bruit, pedal pulses are normal  GI:  Bowel sounds normal, Soft, No tenderness, No masses, No pulsatile masses, no hepatosplenomegally, no bruits  : External genitalia appear normal, No masses or lesions. No discharge. No CVA tenderness. Musculoskeletal:  Intact distal pulses, No edema, No tenderness, No cyanosis, No clubbing. Good range of motion in all major joints. No tenderness to palpation or major deformities noted. Back- No tenderness. Integument:  Warm, Dry, No erythema, No rash. Skin: no rash, no ulcers  Lymphatic:  No lymphadenopathy noted. Neurologic:  confused   Lab Review   Recent Labs     06/01/21  0657   WBC 13.4*   HGB 10.5*   HCT 34.8*         Recent Labs     06/01/21  0657      K 3.9      CO2 18*   BUN 62*   CREATININE 0.9     Recent Labs     06/01/21  0657   AST 10*   ALT 8*   BILITOT 0.5   ALKPHOS 70     No results for input(s): TROPONINI in the last 72 hours. No results found for: BNP  No results found for: INR, PROTIME      EKG:pending    Chest Xray:New small layering right pleural effusion with right lung base atelectasis. [place] [address] LVEF  Labs, echo, meds reviewed  Assessment: 78 y. o.year old with PMH of  has a past medical history of Family history of abdominal aortic aneurysm, Family history of colon cancer, Fracture of left humerus, Hypertension, Hypothyroidism, postsurgical, Lumbar Spinal Stenosis, Multinodular goiter, VILLANUEVA (nonalcoholic steatohepatitis), Necrobiosis lipoidica diabeticorum ([facility] [address] Obesity, Class II, BMI 35-39.9, with comorbidity, Osteoporosis, Polymyalgia rheumatica ([facility] [address] RBBB, Type II Diabetes Mellitus, and Urolithiasis. Recommendations:    1. Elevated trop: not an ACS, most likely from sepsis and pneumonia, will get EKG, recommend to add aspirin, statins, recommend to get CT Chest to check for pneumonia and layering of pleural effusion, recent stress test was normal and echo was normal  2. HTN: STABLE, continue lopressor  3. Hypothyroidism: continue synthroid  4. All labs, medications and tests reviewed, continue all other medications of all above medical condition listed as is.          Zachary Acosta MD, 6/1/2021 11:08 AM

## 2021-06-01 NOTE — CONSULTS
results available in the emergency department. Patient son was available in the emergency department for collateral information. He endorsed that around 1:00 in the afternoon patient was very confused and hallucinating. She has had no hallucinations in the emergency department. Past Medical History:        Diagnosis Date    Family history of abdominal aortic aneurysm     CT Abd Aorta 10/2007 - normal aorta    Family history of colon cancer     Colonoscopy due 1/2013    Fracture of left humerus 3/2014    Carozza    Hypertension     Hypothyroidism, postsurgical 1997    Left Thyroid Lobectomy    Lumbar Spinal Stenosis 2006    severe    Multinodular goiter 1996    suppresion and bx negative; s/p thyroid lobectomy    CHAUDHRY (nonalcoholic steatohepatitis) 2005    chaudhry vs cirrhosis;  Dr. Skyler Morris Necrobiosis lipoidica diabeticorum ([facility] [address]     Obesity, Class II, BMI 35-39.9, with comorbidity     Osteoporosis 1/2015    FRAX 5.5 & 17%    Polymyalgia rheumatica ([facility] [address] 1/2012    Dr King Niko MORGAN; released her 3/2014    RBBB 02/2017    new finding    Type II Diabetes Mellitus 1994    per pt on 3/14/2019\"they do not consider me diabetic anymore- took me off medication long time ago\"    Urolithiasis     once     Past Surgical History:        Procedure Laterality Date    ANUS SURGERY  ? when    Anal Fissure Repair    CARPAL TUNNEL RELEASE Right 3/18/2019    RIGHT CARPAL TUNNEL RELEASE performed by Ethan Clifton MD at [address] Bilateral 10+ yrs ago    [facility]  [facility] one [facility]     ELBOW FRACTURE SURGERY Left 1/2011    ORIF    KNEE ARTHROSCOPY Right 2008    ORIF HUMERUS DECOMPRESSION Right 12/2006    right humeral fracture    SHOULDER ARTHROSCOPY Right ?when    JANA AND BSO      \"sometime in my 50's\"    THYROID LOBECTOMY Left 1995    TONSILLECTOMY  as a child     Current Medications:   Current Facility-Administered Medications: levothyroxine (SYNTHROID) tablet 100 mcg, 100 mcg, Oral, Daily  sodium chloride flush 0.9 % injection 5-40 mL, 5-40 mL, Intravenous, 2 times per day  sodium chloride flush 0.9 % injection 5-40 mL, 5-40 mL, Intravenous, PRN  0.9 % sodium chloride infusion, 25 mL, Intravenous, PRN  enoxaparin (LOVENOX) injection 40 mg, 40 mg, Subcutaneous, Daily  promethazine (PHENERGAN) tablet 12.5 mg, 12.5 mg, Oral, Q6H PRN **OR** ondansetron (ZOFRAN) injection 4 mg, 4 mg, Intravenous, Q6H PRN  acetaminophen (TYLENOL) tablet 650 mg, 650 mg, Oral, Q6H PRN **OR** acetaminophen (TYLENOL) suppository 650 mg, 650 mg, Rectal, Q6H PRN  sennosides-docusate sodium (SENOKOT-S) 8.6-50 MG tablet 1 tablet, 1 tablet, Oral, BID  fleet rectal enema 1 enema, 1 enema, Rectal, Daily PRN  polyethylene glycol (GLYCOLAX) packet 17 g, 17 g, Oral, BID  0.9 % sodium chloride infusion, , Intravenous, Continuous  midodrine (PROAMATINE) tablet 5 mg, 5 mg, Oral, Once    Allergies:  Penicillins    Social History:   TOBACCO:   reports that she has never smoked. She has never used smokeless tobacco.  ETOH:   reports no history of alcohol use. DRUGS:   reports no history of drug use. Family History:       Problem Relation Age of Onset    Other Father         heart issues    Stroke Father     Heart Disease Brother     Other Brother         hx of heart cath with blockage    Heart Surgery Brother     Heart Surgery Brother     Cancer Brother 68        lung    Other Son         knee and multiple issues       REVIEW OF SYSTEMS:     CONSTITUTIONAL:  positive for  fatigue  RESPIRATORY:  negative  CARDIOVASCULAR:  negative  GASTROINTESTINAL:  positive for constipation and abdominal pain  GENITOURINARY:  negative    PHYSICAL EXAM:      VITALS:  BP (!) 134/108   Pulse 94   Temp 98 °F (36.7 °C) (Oral)   Resp 20   Ht 5' 2\" (1.575 m)   Wt 197 lb 3.2 oz (89.4 kg)   SpO2 95%   BMI 36.07 kg/m²      TEMPERATURE:  Current - Temp: 98 °F (36.7 °C);  Max - parenchymal volume loss with mild-to-moderate chronic white matter microvascular ischemic changes. ORBITS: The visualized portion of the orbits demonstrate no acute abnormality. SINUSES: The visualized paranasal sinuses and mastoid air cells demonstrate no acute abnormality. SOFT TISSUES/SKULL:  No acute abnormality of the visualized skull or soft tissues. 1. No acute intracranial abnormality. 2. Stable mild-to-moderate chronic white matter microvascular ischemic changes. CT ABDOMEN PELVIS W IV CONTRAST Additional Contrast? None    Result Date: 5/31/2021  EXAMINATION: CT OF THE ABDOMEN AND PELVIS WITH CONTRAST 5/31/2021 5:46 pm TECHNIQUE: CT of the abdomen and pelvis was performed with the administration of intravenous contrast. Multiplanar reformatted images are provided for review. Dose modulation, iterative reconstruction, and/or weight based adjustment of the mA/kV was utilized to reduce the radiation dose to as low as reasonably achievable. COMPARISON: 04/24/2021 HISTORY: ORDERING SYSTEM PROVIDED HISTORY: abdominal pain and distension, reports constipation, concern for bowel obstruction or other acute process TECHNOLOGIST PROVIDED HISTORY: Additional Contrast?->None Reason for exam:->abdominal pain and distension, reports constipation, concern for bowel obstruction or other acute process Decision Support Exception - unselect if not a suspected or confirmed emergency medical condition->Emergency Medical Condition (MA) Reason for Exam: distention, constipation FINDINGS: Lower Chest: There are at least moderate bilateral pleural effusions with adjacent consolidation in both lung bases. Coronary artery atherosclerosis. There is a 6 mm noncalcified right lower lobe nodule, similar to prior chest CT. Organs: The liver is cirrhotic in configuration with multiple calcified granulomas. Calcified granulomas are also noted in the spleen.   Prior cholecystectomy with mild intra and extrahepatic biliary ductal dilation, similar in appearance to prior examination. No acute abnormality in the pancreas or adrenal glands. 1.1 cm left adrenal nodule is unchanged. No nephrolithiasis. There is bilateral hydroureteronephrosis extending to the level of the urinary bladder. GI/Bowel: The stomach is distended. The small bowel is nondilated. There is moderate stool within the distal colon and rectum with mild wall thickening and adjacent stranding. The colon is nondilated. The appendix is within normal limits. Pelvis: Urinary bladder is distended without wall thickening or vesicular stone. Prior hysterectomy. Peritoneum/Retroperitoneum: Small ascites. No pneumoperitoneum. Atherosclerosis of the nondilated abdominal aorta. Anasarca. Bones/Soft Tissues: There are multilevel degenerative changes of the lumbar spine. Height loss at L3 and L4 is similar in appearance to prior CT. No acute osseous abnormality. 1. Large amount of stool within the distal colon and rectum with mild wall thickening and adjacent stranding, concerning for constipation and stercoral colitis. 2. Urinary bladder distention with bilateral hydroureteronephrosis. There is no nephrolithiasis. Correlation for any signs or symptoms of bladder outlet obstruction is recommended. 3. Nodular hepatic contour, suggestive of cirrhosis. Small ascites. . 4. At least moderate bilateral pleural effusions with adjacent consolidation, likely atelectasis. 5. 1.1 cm indeterminate left adrenal nodule. Adrenal protocol MRI or CT in 1 year could provide further information as clinically indicated.      XR CHEST PORTABLE    Result Date: 5/31/2021  EXAMINATION: ONE XRAY VIEW OF THE CHEST 5/31/2021 5:17 pm COMPARISON: 04/24/2021 HISTORY: ORDERING SYSTEM PROVIDED HISTORY: shortness of breath TECHNOLOGIST PROVIDED HISTORY: Reason for exam:->shortness of breath Reason for Exam: shortness of breath Acuity: Acute Type of Exam: Initial Additional signs and symptoms: na Relevant Medical/Surgical History: diabetes, hypertension FINDINGS: Left chest tube has been removed. Cardiac and mediastinal contours are stable. There is a new small layering right pleural effusion with right lung base atelectasis. There is no pneumothorax. The left lung is clear. Multiple healing bilateral rib fractures are redemonstrated. There is chronic deformity of the distal right clavicle. Left thyroidectomy surgical clips are present. New small layering right pleural effusion with right lung base atelectasis. Assessment & Plan: Ronald Carrasco is a 78y.o. year old female admitted 5/31/2021 for hydroureteronephrosis    1) Mild Bilateral Hydroureteronephrosis   CT a/p 5/31/21: Urinary bladder distention with bilateral hydroureteronephrosis. There is no nephrolithiasis. Large amount of stool within the distal colon and rectum with mild wall thickening and adjacent stranding, concerning for constipation and stercoral colitis. Cr 1.0   UA dip trace leuks, otherwise negative   Hydro likely secondary to distended bladder/constipation, but may have underlying bladder dysfunction/neurogenic bladder with h/o spinal stenosis; will evaluate further as outpatient   Recommend solano removal/voiding trial prior to discharge once constipation resolved. Will continue to follow    Patient seen and examined, chart reviewed.      Electronically signed by Sundar Griffiths PA-C on 6/1/2021 at 8:43 AM

## 2021-06-01 NOTE — ED NOTES
CT ABDOMEN PELVIS W IV CONTRAST Additional Contrast? None  Status: Final result   Order Providers    Authorizing Billing   MD Jaime Bradley MD          Signed by    Signed Date/Time Phone Pager   Ruddy Brad 5/31/2021  6:06 [phone]    Reading Providers    Read Date Phone Pager   Doc Guadalupe May 31, 2021  6:06 [phone]    Radiation Dose Estimates    No radiation information found for this patient   Narrative   EXAMINATION:   CT OF THE ABDOMEN AND PELVIS WITH CONTRAST 5/31/2021 5:46 pm       TECHNIQUE:   CT of the abdomen and pelvis was performed with the administration of   intravenous contrast. Multiplanar reformatted images are provided for review. Dose modulation, iterative reconstruction, and/or weight based adjustment of   the mA/kV was utilized to reduce the radiation dose to as low as reasonably   achievable.       COMPARISON:   04/24/2021       HISTORY:   ORDERING SYSTEM PROVIDED HISTORY: abdominal pain and distension, reports   constipation, concern for bowel obstruction or other acute process   TECHNOLOGIST PROVIDED HISTORY:   Additional Contrast?->None   Reason for exam:->abdominal pain and distension, reports constipation,   concern for bowel obstruction or other acute process   Decision Support Exception - unselect if not a suspected or confirmed   emergency medical condition->Emergency Medical Condition (MA)   Reason for Exam: distention, constipation       FINDINGS:   Lower Chest: There are at least moderate bilateral pleural effusions with   adjacent consolidation in both lung bases.  Coronary artery atherosclerosis. There is a 6 mm noncalcified right lower lobe nodule, similar to prior chest   CT.       Organs:  The liver is cirrhotic in configuration with multiple calcified   granulomas.  Calcified granulomas are also noted in the spleen.  Prior   cholecystectomy with mild intra and extrahepatic biliary ductal dilation,   similar in appearance to prior examination.  No acute abnormality in the   pancreas or adrenal glands.  1.1 cm left adrenal nodule is unchanged.  No   nephrolithiasis. Lenetta Cave is bilateral hydroureteronephrosis extending to the   level of the urinary bladder.       GI/Bowel: The stomach is distended.  The small bowel is nondilated.  There is   moderate stool within the distal colon and rectum with mild wall thickening   and adjacent stranding.  The colon is nondilated.  The appendix is within   normal limits.       Pelvis: Urinary bladder is distended without wall thickening or vesicular   stone.  Prior hysterectomy.       Peritoneum/Retroperitoneum: Small ascites.  No pneumoperitoneum. Atherosclerosis of the nondilated abdominal aorta.  Anasarca.       Bones/Soft Tissues: There are multilevel degenerative changes of the lumbar   spine.  Height loss at L3 and L4 is similar in appearance to prior CT.  No   acute osseous abnormality.           Impression   1. Large amount of stool within the distal colon and rectum with mild wall   thickening and adjacent stranding, concerning for constipation and stercoral   colitis. 2. Urinary bladder distention with bilateral hydroureteronephrosis. Lenetta Cave is   no nephrolithiasis.  Correlation for any signs or symptoms of bladder outlet   obstruction is recommended. 3. Nodular hepatic contour, suggestive of cirrhosis.  Small ascites. .   4. At least moderate bilateral pleural effusions with adjacent consolidation,   likely atelectasis.    5. 1.1 cm indeterminate left adrenal nodule.  Adrenal protocol MRI or CT in 1   year could provide further information as clinically indicated.              Lionel Moss RN  05/31/21 2009

## 2021-06-01 NOTE — CARE COORDINATION
CM in to see Pt to discuss discharge planning. Pt from [facility] skilled. Discharge plan is home with her son, grandson, and home care. Pt has DME to include wheelchair, walker, bedside commode, shower chair. Pt is interested in home care at discharge. Pt states she has had [address] in the past.      Pt son requested information regarding hospice/palliative care. PS to [facility] for referral  PS to Dr. Katelyn Bush to update, request home care order.       CM following

## 2021-06-01 NOTE — ED NOTES
Report called to [facility]. Patient to be transported to floor.  Patient going to [address] unit clerk made aware      Toya Copeland RN  05/31/21 [id number]

## 2021-06-01 NOTE — CONSULTS
Pt seen ,examined,interviewed and chart reviewed. Please see the dictated consult for details     Imp :   1. B/l hydronephrosis with distended  bladder and constipation, likely from ac bladder obstruction - I did not see any stone - with solano now   2. Severe fluid  overload with underlying NAFLD/ cirrhosis ( low  alb 2.8 ) and hypotension   3. She may have hepatic encephalopathy  unless from  dementia with underlying small vessels dz ( CNS )   4. underlying DM/HTN/ etc     Plan:  1. D/c NS  2. ua , urinary indices ordered   3. increase midodrine 10 TID  4. Try po loop for sx management only  5. It  Appears to  Me  family ants comfort care and palliative care  was consulted   6.  I will try conservative sx management   7. follow clinically       Thanks for the consult    #[id number]

## 2021-06-01 NOTE — ED NOTES
XR CHEST PORTABLE  Status: Final result   Order Providers    Authorizing Billing   MD Carolyn Lopez MD          Signed by    Signed Date/Time Phone Pager   Sonny Willard 5/31/2021  6:54 [phone]    Reading Providers    Read Date Phone Pager   Audra Austin May 31, 2021  6:54 [phone]    Radiation Dose Estimates    No radiation information found for this patient   Narrative   EXAMINATION:   ONE XRAY VIEW OF THE CHEST       5/31/2021 5:17 pm       COMPARISON:   04/24/2021       HISTORY:   ORDERING SYSTEM PROVIDED HISTORY: shortness of breath   TECHNOLOGIST PROVIDED HISTORY:   Reason for exam:->shortness of breath   Reason for Exam: shortness of breath   Acuity: Acute   Type of Exam: Initial   Additional signs and symptoms: na   Relevant Medical/Surgical History: diabetes, hypertension       FINDINGS:   Left chest tube has been removed.  Cardiac and mediastinal contours are   stable.  There is a new small layering right pleural effusion with right lung   base atelectasis.  There is no pneumothorax.  The left lung is clear. Multiple healing bilateral rib fractures are redemonstrated. Ceil Wilson is   chronic deformity of the distal right clavicle.  Left thyroidectomy surgical   clips are present.           Impression   New small layering right pleural effusion with right lung base atelectasis.           Kelly Newby RN  05/31/21 2008

## 2021-06-01 NOTE — PROGRESS NOTES
Occupational Therapy  [facility] OCCUPATIONAL THERAPY EVALUATION    History  [place]:  The primary encounter diagnosis was Altered mental status, unspecified altered mental status type. Diagnoses of Constipation, unspecified constipation type and Hypotension, unspecified hypotension type were also pertinent to this visit. Restrictions:                           Communication with other providers: RN, PT    Subjective:  Patient states:  \"take me to the front door\"  Pain:  C/o generalized leg pain but does not rate  Patient goal:  Home     Occupational profile (relevant social history and personal factors):         Examination of body systems (includes body structures/functions, activity/participation limitations):  · Orientation: ox self only  · Cognition:  Confused but can follow one step commands. Cannot re-orient to place and situation despite max cuing and reminders. · One visual hallucination during session. · Observation:  Received pt in bed. Alert, intermittently distraught, confused. · [facility]   · Hearing:  Mild [place]  · ROM:  WFL BUE  · Strength: generalized BUE weakness which is likely chronic  · Sensation: not tested    ADLs  Feeding: setup    Grooming: setup in seated    Dressing: UB CGA in seated LB MaxA    Bathing: UB CGA in seated LB MaxA    Toileting: MaxA, had small amount of bowl incontinence while seated EOB    *Some ADL determined per observation of actual ADL performance vs  functional mobility, balance, activity tolerance, and cognition.      AM-PAC 6 click short form for inpatient daily activity:  Raw Score: 15  24/24 = unimpaired  23/24 = 1-20% impaired   20/24-22/24 = 21-40% impaired  15/24-19/24 = 41-59% impaired   10/24-14/24 = 60%-79% impaired  7/24-9/24 = 80%-99% impaired  6/24 = 100% impaired    Functional Mobility  Bed mobility:   Supine to sit: ModA, cues, HOB elevated    Sitting balance: good    Transfers:   Sit to stand: Felipe for lift, needs several tries and excessive rocking  Stand to sit: CGA to chair, cues for safety    Standing balance: CGA c RW    Ambulation:  CGA c RW 3 feet to chair. Activity tolerance  Limited by SOB, confusion. Below baseline. Assessment:  Assessment  Performance deficits / Impairments: Decreased functional mobility , Decreased high-level IADLs, Decreased cognition, Decreased ADL status, Decreased strength, Decreased safe awareness, Decreased endurance  Treatment Diagnosis: PNA, AMS  Prognosis: Fair  Decision Making: Medium Complexity  REQUIRES OT FOLLOW UP: Yes  Discharge Recommendations: Subacute/Skilled Nursing Facility    Pt is an 78year old F admitted from [place] [address] and for treatment of possible PNA. Pt is a very poor historian and continues to be confused today. Pt will require continued OT during LOS and at d/c. Rec SNF. Goals:  By d/c or goals met:     Pt will perform all bed mobility with Felipe in prep for EOB/OOB activity. Pt will perform all functional transfers with Felipe and appropriate use of LRD to bed, toilet, chair in prep for increased functional independence. Pt will perform UB ADLs with Felipe to increase functional independence. Pt will perform LB ADLs with ModA to increase functional independence. Pt will perform all aspects of toileting with ModA to increase functional independence. Pt will participate in therex/therax c emphasis on strength, activity tolerance,  safety, MASODO tasks. Plan:  Plan  Times per week: 3x      Recommendation for activity with nursing staff:  up to chair for meals  bedside commode    Treatment today:      Therapeutic Activity Training:   Therapeutic activity training was instructed today. Cues were given for safety, sequence, UE/LE placement, visual cues, and balance. Activities performed today included bed mobility training, sup-sit, sit-stand, SPT. Education: Role of OT, OT POC, d/c needs, home safety    Safety: Left in chair with all needs in reach.   chair alarm applied. Gait belt used for transfer and mobility. Time in:  1430  Time out:  1500  Timed treatment minutes:  15  Total treatment time:  30    Electronically signed by:    [address], OTR/L, [facility]   [id number]   3:13 PM, 6/1/2021

## 2021-06-01 NOTE — H&P
History and Physical      Name:  James Carvajal /Age/Sex: 1940  (78 y.o. female)   MRN & CSN:  [id number] & [id number] Admission Date/Time: 2021  4:35 PM   Location:  [facility] PCP: Zenia Pascual, [address] Day: 2    Assessment and Plan: James Carvajal is a 78 y.o.  female  who presents with Hydroureteronephrosis    Hydroureteronephrosis, bilateral  BALBIR, mild  -Admit to inpatient services   -CT head no acute intracranial abnormality. CT abdomen pelvis with IV contrast shows large amount of stool within the distal colon and rectum with mild wall thickening and adjacent stranding. Urinary bladder distention with bilateral hydroureteronephrosis.  -Place Roberts and monitor I's and O's  -Urology consult  -Nephrology consult  -Creatinine 1.0 and ranges anywhere from 0.7 to 1.0. Given above consulting nephrology for mild BALBIR with potential for decompensation if not corrected. -Urine osmolality, urine sodium, and urine creatinine and renal diet    Constipation  -Optimize bowel regimen  -Likely cause of above.  -No evidence of stone      Elevated troponins  Elevated BNP  -Initial troponin 0.064, without chest pain. Second troponin 0.048.  -Initial BMP 27,379  -Consulted cardiology, appreciate recommendations. Likely type II      Other chronic medical conditions:   Holding all home meds except stated above or contraindicated. Lumbar spinal stenosis  Multinodular goiter status post resection with postoperative hypothyroidism  Hyperlipidemia  Generalized weakness  HFpEF  HTN        Diet DIET RENAL;   DVT Prophylaxis [x] Lovenox, []  Heparin, [] SCDs, [] Ambulation   GI Prophylaxis [] PPI,  [] H2 Blocker,  [] Carafate,  [] Diet/Tube Feeds   Code Status Full Code   Disposition Patient requires continued admission due to Hydroureteronephrosis   MDM [] Low, [] Moderate,[x]  High  Patient's risk as above due to acuity of condition with potential for decompensation.      History of Present pallor and rash. Neurological: Negative for dizziness, seizures, syncope, speech difficulty, weakness, numbness and headaches. Psychiatric/Behavioral: Negative for decreased concentration. Objective:   No intake or output data in the 24 hours ending 06/01/21 0634   Vitals:   Vitals:    06/01/21 0245   BP: (!) 134/108   Pulse: 94   Resp: 20   Temp: 98 °F (36.7 °C)   SpO2: 95%     BP (!) 134/108   Pulse 94   Temp 98 °F (36.7 °C) (Oral)   Resp 20   Ht 5' 2\" (1.575 m)   Wt 197 lb 3.2 oz (89.4 kg)   SpO2 95%   BMI 36.07 kg/m²   Physical Exam:   Physical Exam  Vitals and nursing note reviewed. Constitutional:       General: She is awake. She is not in acute distress. Appearance: Normal appearance. She is obese. She is not ill-appearing, toxic-appearing or diaphoretic. Interventions: She is not intubated. HENT:      Head: Atraumatic. Right Ear: External ear normal.      Left Ear: External ear normal.      Nose: Nose normal. No rhinorrhea. Mouth/Throat:      Mouth: Mucous membranes are dry. Eyes:      General: No scleral icterus. Conjunctiva/sclera: Conjunctivae normal.      Pupils: Pupils are equal, round, and reactive to light. Cardiovascular:      Rate and Rhythm: Normal rate and regular rhythm. Pulses: Normal pulses. Heart sounds: Normal heart sounds. No murmur heard. No gallop. Pulmonary:      Effort: Pulmonary effort is normal. No tachypnea, accessory muscle usage or respiratory distress. She is not intubated. Breath sounds: Normal breath sounds. No wheezing, rhonchi or rales. Abdominal:      General: Abdomen is protuberant. Bowel sounds are increased. There is no distension. Palpations: Abdomen is soft. Tenderness: There is no abdominal tenderness. There is no guarding or rebound. Negative signs include Johns's sign and Rovsing's sign. Musculoskeletal:         General: Normal range of motion. Cervical back: Neck supple.       Right (Left, 1/2011); Cataract removal with implant (Bilateral, 10+ yrs ago); Colonoscopy (last one 5+ ys ago ); Tonsillectomy (as a child); and Carpal tunnel release (Right, 3/18/2019). Allergies: Allergies   Allergen Reactions    Penicillins Rash       FAM HX: family history includes Cancer (age of onset: 68) in her brother; Heart Disease in her brother; Heart Surgery in her brother and brother; Other in her brother, father, and son; Stroke in her father. Soc HX:   Social History     Socioeconomic History    Marital status:      Spouse name: None    Number of children: 2    Years of education: high schoo    Highest education level: None   Occupational History    Occupation: retired   Tobacco Use    Smoking status: Never Smoker    Smokeless tobacco: Never Used   Vaping Use    Vaping Use: Never used   Substance and Sexual Activity    Alcohol use: No    Drug use: No    Sexual activity: None   Other Topics Concern    None   Social History Narrative    None     Social Determinants of Health     Financial Resource Strain:     Difficulty of Paying Living Expenses:    Food Insecurity:     Worried About Running Out of Food in the Last Year:     Ran Out of Food in the Last Year:    Transportation Needs:     Lack of Transportation (Medical):      Lack of Transportation (Non-Medical):    Physical Activity:     Days of Exercise per Week:     Minutes of Exercise per Session:    Stress:     Feeling of Stress :    Social Connections:     Frequency of Communication with Friends and Family:     Frequency of Social Gatherings with Friends and Family:     Attends [employer] Services:     Active Member of Clubs or Organizations:     Attends Club or Organization Meetings:     Marital Status:    Intimate Partner Violence:     Fear of Current or Ex-Partner:     Emotionally Abused:     Physically Abused:     Sexually Abused:        Data:   CBC with Differential:    Lab Results   Component Value Date WBC 11.4 06/01/2021    RBC 3.78 06/01/2021    HGB 11.1 06/01/2021    HCT 35.5 06/01/2021     06/01/2021    MCV 93.9 06/01/2021    MCH 29.4 06/01/2021    MCHC 31.3 06/01/2021    RDW 15.9 06/01/2021    SEGSPCT 87.2 06/01/2021    LYMPHOPCT 3.5 06/01/2021    MONOPCT 6.1 06/01/2021    EOSPCT 2.3 11/16/2020    BASOPCT 0.2 06/01/2021    MONOSABS 0.7 06/01/2021    LYMPHSABS 0.4 06/01/2021    EOSABS 0.3 06/01/2021    BASOSABS 0.0 06/01/2021    DIFFTYPE AUTOMATED DIFFERENTIAL 06/01/2021       CMP:     Lab Results   Component Value Date     05/31/2021    K 3.9 05/31/2021     05/31/2021    CO2 20 05/31/2021    BUN 68 05/31/2021    CREATININE 1.0 05/31/2021    GFRAA >60 05/31/2021    GFRAA >60 12/13/2012    AGRATIO 2.3 06/17/2019    LABGLOM 53 05/31/2021    LABGLOM 42 09/23/2019    GLUCOSE 110 05/31/2021    GLUCOSE 129 05/11/2012    PROT 5.2 05/31/2021    PROT 6.4 03/18/2013    LABALBU 2.9 05/31/2021    CALCIUM 8.0 05/31/2021    BILITOT 0.3 05/31/2021    ALKPHOS 71 05/31/2021    AST 10 05/31/2021    ALT 9 05/31/2021       Troponin:  Lab Results   Component Value Date    TROPONINT 0.048 06/01/2021       U/A:    Lab Results   Component Value Date    COLORU YELLOW 05/31/2021    PROTEINU NEGATIVE 05/31/2021    PHUR 7.0 06/14/2017    LABCAST NONE SEEN 06/14/2017    WBCUA 1 05/31/2021    RBCUA <1 05/31/2021    MUCUS RARE 05/31/2021    TRICHOMONAS NONE SEEN 05/31/2021    BACTERIA NEGATIVE 05/31/2021    CLARITYU CLEAR 05/31/2021    SPECGRAV 1.018 05/31/2021    LEUKOCYTESUR TRACE 05/31/2021    UROBILINOGEN NEGATIVE 05/31/2021    BILIRUBINUR NEGATIVE 05/31/2021    BLOODU NEGATIVE 05/31/2021     Radiology results:  CT HEAD WO CONTRAST   Final Result   1. No acute intracranial abnormality. 2. Stable mild-to-moderate chronic white matter microvascular ischemic   changes. XR CHEST PORTABLE   Final Result   New small layering right pleural effusion with right lung base atelectasis.          CT ABDOMEN PELVIS W IV CONTRAST Additional Contrast? None   Final Result   1. Large amount of stool within the distal colon and rectum with mild wall   thickening and adjacent stranding, concerning for constipation and stercoral   colitis. 2. Urinary bladder distention with bilateral hydroureteronephrosis. There is   no nephrolithiasis. Correlation for any signs or symptoms of bladder outlet   obstruction is recommended. 3. Nodular hepatic contour, suggestive of cirrhosis. Small ascites. .   4. At least moderate bilateral pleural effusions with adjacent consolidation,   likely atelectasis. 5. 1.1 cm indeterminate left adrenal nodule. Adrenal protocol MRI or CT in 1   year could provide further information as clinically indicated. Medications:   Home Medications:   Prior to Admission medications    Medication Sig Start Date End Date Taking? Authorizing Provider   metoprolol succinate (TOPROL XL) 25 MG extended release tablet Take 0.5 tablets by mouth daily 3/30/21   Zenia Pascual MD   furosemide (LASIX) 20 MG tablet Take 1 tablet by mouth daily 3/30/21   Zenia Pascual MD   alendronate (FOSAMAX) 70 MG tablet Take 1 tablet by mouth every 7 days  Patient taking differently: Take 70 mg by mouth every 7 days Mondays 2/18/21   Zenia Pascual MD   Handicap Placard MISC by Does not apply route Good for 5 years 2/18/21   Zenia Pascual MD   levothyroxine (SYNTHROID) 100 MCG tablet Take 1 tablet by mouth daily 2/8/21   Zenia Pascual MD   Cholecalciferol (VITAMIN D) 2000 UNITS CAPS capsule Take 1 capsule by mouth daily.     Historical Provider, MD     Medications:    levothyroxine  100 mcg Oral Daily    sodium chloride flush  5-40 mL Intravenous 2 times per day    enoxaparin  40 mg Subcutaneous Daily    sennosides-docusate sodium  1 tablet Oral BID    midodrine  5 mg Oral Once      Infusions:    sodium chloride      sodium chloride 125 mL/hr at 06/01/21 0969     PRN Meds: sodium chloride flush, 5-40 mL, PRN  sodium chloride, 25 mL, PRN  promethazine, 12.5 mg, Q6H PRN   Or  ondansetron, 4 mg, Q6H PRN  acetaminophen, 650 mg, Q6H PRN   Or  acetaminophen, 650 mg, Q6H PRN  fleet, 1 enema, Daily PRN        Electronically signed by Stepan Dill DO on 6/1/2021 at 6:34 AM      This dictation was created with voice recognition software.  While attempts have been made to review the dictation as it is transcribed, on occasion the spoken word can be misinterpreted by the technology leading to omissions or inappropriate words, phrases or sentences. '

## 2021-06-01 NOTE — PROGRESS NOTES
Hospitalist Progress Note      Name:  Catherine Santos /Age/Sex: 1940  (78 y.o. female)   MRN & CSN:  [id number] & [id number] Admission Date/Time: 2021  4:35 PM   Location:  7491/4742-Q PCP: Joo Buenrostro, [address] Day: 2    Assessment and Plan: Catherine Santos is a 78 y.o.  female  who presents with Hydroureteronephrosis    Acute metabolic encephalopathy, present on admission. Patient has periods of confusion from the facility and while here. This is due to multiple underlying medical problems. Probable sepsis, present on admission. Patient has evidence of leukocytosis along with tachycardia and hypotension. She does have signs and symptoms concerning for urinary or respiratory source of infection. Urine cultures ordered. Lactic acid is ordered. Continue with IV fluids. Will initiate ceftriaxone. Constipation, patient is receiving Senokot-S. We will add MiraLAX twice daily. Suppositories as needed  Bilateral hydronephrosis, appreciate urology input, likely due to bladder dysfunction and may be neurogenic bladder. Currently has Roberts catheter in place. Hypertension, blood pressure is fluctuating. She was on antihypertensive medications most recently a month ago but has been taken off of it. Chronic diastolic congestive heart failure, patient used to be on Lasix and this is on hold at this time due to clinical dehydration. However will decrease IV fluids. Echocardiogram in 2021 shows normal ejection fraction with left ventricular hypertrophy. Cardiology input appreciated. Fluid status would be difficult to manage in this lady due to low albumin status. Dehydration, patient has had decreased oral intake. She is receiving IV fluids. Will decrease rate to 75 an hour in light of history of CHF. Weakness, patient has significant weakness and debility. Physical and Occupational Therapy will be consulted to help in her evaluation.   Abnormal liver noted on CT concerning for cirrhosis. Patient denies history of liver disease. CODE STATUS. Please see separately dictated note. Patient will be made DNR. Patient and son are interested in pursuing palliative care approach which is appropriate. Consultation will be ordered. Diet DIET RENAL;   DVT Prophylaxis [x] Lovenox, []  Heparin, [] SCDs, [] Ambulation   GI Prophylaxis [] PPI,  [] H2 Blocker,  [] Carafate,  [x] Diet/Tube Feeds   Code Status Full Code   Disposition Patient requires continued admission due to continued medical evaluation and treatment. MDM [] Low, [] Moderate,[x]  High  Patient's risk as above due to multiple problems noted above. History of Present Illness:     Chief Complaint: Hydroureteronephrosis  Vishal Lombardi is a 78 y.o.  female  who presents with altered mental status, constipation and abdominal pain. Patient was found to have constipation and bilateral hydronephrosis. I have visited with the patient along with Meera Antonio, [facility]. In the presence of her son Gabby Grier. The patient initially appeared confused but later had better orientation was able to answer questions properly. The son reported that her mental status fluctuates. The patient used to live with her brother but sustained a fall several times resulting left-sided rib fractures and she was admitted at a [facility] to the intensive care unit for management of hemothorax and required chest tube placement. She was then discharged to a skilled nursing facility where she underwent rehab and then to an assisted living facility. While the assisted living facility according to the son, her condition declined with progressive weakness and constipation abdominal pain and decreased oral intake. Her mental status started fluctuating and she started hallucinating and she presented to us. At the time of my evaluation the patient reports less abdominal pain and she tells me she had 2 small bowel movements. She has no nausea or vomiting. She remains very weak. She continues to experience shortness of breath. She denies chest pain. She denies being lightheaded. The patient and her son are interested in pursuing palliative care approach for her condition. She wishes to be home if able with supportive services. CODE STATUS is discussed with both of them and verified to be DNR. Her son Sonny Swanson is the surrogate decision-maker. 35 minutes was spent with the patient today, more than 50% was counseling the patient on her condition and courting her treatment plan. Labs reviewed and noted for leukocytosis. Objective:   No intake or output data in the 24 hours ending 06/01/21 1407   Vitals:   Vitals:    06/01/21 0915   BP: 86/66   Pulse: 106   Resp: 24   Temp: 98 °F (36.7 °C)   SpO2: 96%     Physical Exam:   GEN Awake female, appears weak. EYES Pupils are equally round. Conjunctive is pale. HENT Mucous membranes are dry. Oral pharynx no evidence of thrush. NECK Supple, no apparent thyromegaly or masses. RESP Clear to auscultation, decreased breath sounds at the bases, no accessory muscle usage. CARDIO/VASC S1/S2 auscultated. Regular rate without appreciable murmurs,  No JVD. Peripheral pulses equal bilaterally and palpable. There is evidence of a mild lower extremity edema which appears chronic. Willetta Smiles GI Abdomen is soft without significant tenderness, masses. Bowel sounds are normoactive. SKIN no rash no  NEURO there is no facial weakness noted. Both lower extremities have a strength of 4/5 with patient barely able to raise them against gravity. There is no sensory deficit noted. She has equal hand . Willetta Smiles PSYCH patient initially appeared inappropriate and answer my questions and seemed confused. However after repetitive questioning she seems to answer questions properly. She knows she is in the hospital.  She is oriented to person as well.     Medications:   Medications:    levothyroxine  100 mcg Oral Daily    sodium chloride flush  5-40 mL Intravenous 2 times per day    enoxaparin  40 mg Subcutaneous Daily    sennosides-docusate sodium  1 tablet Oral BID    polyethylene glycol  17 g Oral BID    aspirin  325 mg Oral Daily    midodrine  5 mg Oral Once      Infusions:    sodium chloride      sodium chloride 75 mL/hr at 06/01/21 1139     PRN Meds: sodium chloride flush, 5-40 mL, PRN  sodium chloride, 25 mL, PRN  promethazine, 12.5 mg, Q6H PRN   Or  ondansetron, 4 mg, Q6H PRN  acetaminophen, 650 mg, Q6H PRN   Or  acetaminophen, 650 mg, Q6H PRN  fleet, 1 enema, Daily PRN          Electronically signed by Kamaljit Corcoran MD on 6/1/2021 at 2:07 PM

## 2021-06-02 LAB
ALBUMIN SERPL-MCNC: 2.6 GM/DL (ref 3.4–5)
ALP BLD-CCNC: 59 IU/L (ref 40–128)
ALT SERPL-CCNC: 6 U/L (ref 10–40)
ANION GAP SERPL CALCULATED.3IONS-SCNC: 11 MMOL/L (ref 4–16)
AST SERPL-CCNC: 10 IU/L (ref 15–37)
BILIRUB SERPL-MCNC: 0.3 MG/DL (ref 0–1)
BUN BLDV-MCNC: 64 MG/DL (ref 6–23)
CALCIUM SERPL-MCNC: 7.7 MG/DL (ref 8.3–10.6)
CHLORIDE BLD-SCNC: 110 MMOL/L (ref 99–110)
CO2: 15 MMOL/L (ref 21–32)
CREAT SERPL-MCNC: 1.2 MG/DL (ref 0.6–1.1)
GFR AFRICAN AMERICAN: 52 ML/MIN/1.73M2
GFR NON-AFRICAN AMERICAN: 43 ML/MIN/1.73M2
GLUCOSE BLD-MCNC: 104 MG/DL (ref 70–99)
MAGNESIUM: 3 MG/DL (ref 1.8–2.4)
OSMOLALITY URINE: 518 MOSM/KG (ref 50–800)
PHOSPHORUS: 5.5 MG/DL (ref 2.5–4.9)
POTASSIUM SERPL-SCNC: 4.7 MMOL/L (ref 3.5–5.1)
SODIUM BLD-SCNC: 136 MMOL/L (ref 135–145)
TOTAL PROTEIN: 4.4 GM/DL (ref 6.4–8.2)

## 2021-06-02 PROCEDURE — 6370000000 HC RX 637 (ALT 250 FOR IP): Performed by: INTERNAL MEDICINE

## 2021-06-02 PROCEDURE — 84100 ASSAY OF PHOSPHORUS: CPT

## 2021-06-02 PROCEDURE — 2700000000 HC OXYGEN THERAPY PER DAY

## 2021-06-02 PROCEDURE — 80053 COMPREHEN METABOLIC PANEL: CPT

## 2021-06-02 PROCEDURE — 6360000002 HC RX W HCPCS: Performed by: STUDENT IN AN ORGANIZED HEALTH CARE EDUCATION/TRAINING PROGRAM

## 2021-06-02 PROCEDURE — 83735 ASSAY OF MAGNESIUM: CPT

## 2021-06-02 PROCEDURE — 94761 N-INVAS EAR/PLS OXIMETRY MLT: CPT

## 2021-06-02 PROCEDURE — 93005 ELECTROCARDIOGRAM TRACING: CPT | Performed by: INTERNAL MEDICINE

## 2021-06-02 PROCEDURE — 99233 SBSQ HOSP IP/OBS HIGH 50: CPT | Performed by: INTERNAL MEDICINE

## 2021-06-02 PROCEDURE — 36415 COLL VENOUS BLD VENIPUNCTURE: CPT

## 2021-06-02 PROCEDURE — 1200000000 HC SEMI PRIVATE

## 2021-06-02 PROCEDURE — 6360000002 HC RX W HCPCS: Performed by: INTERNAL MEDICINE

## 2021-06-02 PROCEDURE — 2580000003 HC RX 258: Performed by: STUDENT IN AN ORGANIZED HEALTH CARE EDUCATION/TRAINING PROGRAM

## 2021-06-02 PROCEDURE — 2580000003 HC RX 258: Performed by: INTERNAL MEDICINE

## 2021-06-02 RX ORDER — 0.9 % SODIUM CHLORIDE 0.9 %
250 INTRAVENOUS SOLUTION INTRAVENOUS ONCE
Status: COMPLETED | OUTPATIENT
Start: 2021-06-02 | End: 2021-06-02

## 2021-06-02 RX ADMIN — MIDODRINE HYDROCHLORIDE 10 MG: 5 TABLET ORAL at 16:48

## 2021-06-02 RX ADMIN — MIDODRINE HYDROCHLORIDE 10 MG: 5 TABLET ORAL at 09:12

## 2021-06-02 RX ADMIN — SODIUM CHLORIDE, PRESERVATIVE FREE 10 ML: 5 INJECTION INTRAVENOUS at 20:15

## 2021-06-02 RX ADMIN — ENOXAPARIN SODIUM 40 MG: 40 INJECTION SUBCUTANEOUS at 09:12

## 2021-06-02 RX ADMIN — SODIUM CHLORIDE, PRESERVATIVE FREE 10 ML: 5 INJECTION INTRAVENOUS at 09:13

## 2021-06-02 RX ADMIN — SODIUM CHLORIDE 250 ML: 9 INJECTION, SOLUTION INTRAVENOUS at 09:07

## 2021-06-02 RX ADMIN — ASPIRIN 325 MG: 325 TABLET, COATED ORAL at 09:13

## 2021-06-02 RX ADMIN — MIDODRINE HYDROCHLORIDE 10 MG: 5 TABLET ORAL at 12:39

## 2021-06-02 RX ADMIN — CEFTRIAXONE 1000 MG: 1 INJECTION, POWDER, FOR SOLUTION INTRAMUSCULAR; INTRAVENOUS at 13:48

## 2021-06-02 ASSESSMENT — PAIN SCALES - GENERAL
PAINLEVEL_OUTOF10: 0

## 2021-06-02 NOTE — CONSULTS
[address], [address] [facility]                                  CONSULTATION    PATIENT NAME: Zach Harris                     :        1940  MED REC NO:   [id number]                          ROOM:       8042  ACCOUNT NO:   [de-identified]                           ADMIT DATE: 2021  PROVIDER:     Renny Martinez MD    CONSULT DATE:  2021    CONSUL REQUESTED BY:  Justine Driscoll DO    REASON FOR CONSULT:  Bilateral hydronephrosis and fluid overload. BRIEF HISTORY:  The patient is an 78-year-old female who presented to  the emergency room apparently with feeling of bloating and constipation,  and apparently altered mental status. I am assuming the family member  brought her. I could not find whether she was brought by the squad and  obviously the patient is not able to tell me much. In any way, in the  emergency room, the patient was rather hypotensive with blood pressure  of 80-90/50-60, and underwent several diagnostic tests which include  imaging and biochemical imaging study which include CT of the head as  well as CT of the abdomen and pelvis with IV contrast, showed large  amount of stool, bilateral hydronephrosis, urinary bladder distention,  and nodular hepatic contour suggestive of cirrhosis and ascites. There  is also 1.1-cm left adrenal nodule, incidentally it was found. Biochemical testing showed creatinine of 1.0, BUN of 68, so relatively  high BUN and low albumin, and she was subsequently admitted for further  evaluation. So far she was given IV fluids. When I went to see her, she had  significant edema, wheezing and crackles in her lungs, so clearly she is  fluid overloaded and is actively hallucinating and sitting in the chair  and one can hear her wheezing from a distance.     It looks like her creatinine runs around about 0.8 to 1.0 range at least  since 2017 or so, she probably has some CKD stage III considering her  underlying cirrhosis. I also looked at the chart, it looks like the hospitalist had a  discussion in the family and they wanted to make a comfort care and  looks like palliative care too was consulted. That makes perfect sense  to me. PAST MEDICAL HISTORY:  1.  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 2.  Diabetes mellitus type 2.  3.  Hypothyroidism. 4.  Hypertension. 5.  Osteoporosis. By the way, most of the history is taken from [facility], so I am not 100%  sure about the authenticity of all the information. PAST SURGICAL HISTORY:  1. Carpal tunnel surgery  2. Shoulder surgery, which is true, I saw the scar. 3.  She also had some kind of thyroid surgery, gallbladder surgery, and  tonsillectomy. HABITS:  She states she does not smoke, never did. No history of  alcohol or illicit drug abuse. OB/GYN HISTORY:  According to her, she is . No history of  eclampsia or preeclampsia. 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 MEDICATIONS:  Her home medications according to [facility], again I cannot  verify the authenticity, but it probably true. She is on Toprol XL 25  mg daily, Lasix 20 mg daily, Fosamax 70 mg daily, Synthroid, and vitamin  D. Current medications in the hospital:  1. She is on normal saline, which I discontinued. 2.  She is also on Synthroid  3. Midodrine one dose was given, which I will increase to 10 t.i.d.  4.  She is on Rocephin. REVIEW OF SYSTEMS  Mainly constipation, edema, and shortness of breath. She is obviously confused. She is hallucinating, she thinks there is a  cat in the room. Rest of review of systems is negative other than  previous paragraph.     PHYSICAL EXAMINATION:  VITAL SIGNS:  At the time of examination temperature, her T-max of 98.3,  blood pressure 90s/50s, pulse of 90, respiratory rate 20, satting 97%. GENERAL:  The patient is sitting in the chair. She is alert but  pleasantly confused. HEENT:  Normocephalic and atraumatic. Eyes: At least 1+ conjunctival  pallor, but might have little bit of scleral icterus too, although I was  not sure. CARDIOVASCULAR:  Irregularly irregular rhythm. RESPIRATORY:  Crackles at the bases, more in the right than the left  lung. ABDOMEN:  Looks like some ascites, abdominal wall edema. EXTREMITIES:  At least 2+ edema bilaterally. She does have a Roberts  catheter by the way. LABORATORY VALUES AND ANCILLARY SERVICES:  She has some low bicarb of  18, BUN and creatinine 62 and 0.9 respectively. Calcium was 7.8, _____  albumin of 2.8. She is slightly anemic, hemoglobin 10.5. Urinalysis  was ordered. IMPRESSION:  An 66-year-old female with bilateral hydronephrosis and  fluid overload. 1.  Bilateral hydronephrosis with distended bladder and constipation,  likely from acute bladder obstruction. I do not see any stone. She  does have a Roberts catheter now. 2.  Severe fluid overload with underlying, probably nonalcoholic fatty  liver disease, i.e., cirrhosis with low albumin of 2.8 and hypertension. She obviously does have risk for hepatorenal syndrome. 3.  She might have cardiac encephalopathy unless she has underlying  dementia and being in the hospital made her more confused. She also  might have small vessel disease given her history of diabetes and  hypertension. 4.  Underlying diabetes and hypertension along with multiple other  comorbidities. PLAN:  1. Discontinue normal saline. 2.  Increase midodrine to 10 t.i.d.  3.  We can try some oral loops if the blood pressure does not fall much,  for symptom management only if she is very short of breath. It appears  to me that family wants the comfort care, and palliative care was  consulted which I completely agree with.   In that case,

## 2021-06-02 NOTE — PROGRESS NOTES
drugs. Family history:  family history includes Cancer (age of onset: 68) in her brother; Heart Disease in her brother; Heart Surgery in her brother and brother; Other in her brother, father, and son; Stroke in her father. Allergies   Allergen Reactions    Penicillins Rash         Objective:   BP (!) 80/52   Pulse 100   Temp 98.2 °F (36.8 °C) (Oral)   Resp 19   Ht 5' 2\" (1.575 m)   Wt 193 lb 1.6 oz (87.6 kg)   SpO2 100%   BMI 35.32 kg/m²       Intake/Output Summary (Last 24 hours) at 6/2/2021 0902  Last data filed at 6/1/2021 1945  Gross per 24 hour   Intake 10 ml   Output 800 ml   Net -790 ml       TELEMETRY: sinus    Physical Exam:  Constitutional:  Well developed, Well nourished, No acute distress, Non-toxic appearance. HENT:  Normocephalic, Atraumatic, Bilateral external ears normal, Oropharynx moist, No oral exudates, Nose normal. Neck- Normal range of motion, No tenderness, Supple, No stridor. Eyes:  PERRL, EOMI, Conjunctiva normal, No discharge. Respiratory:  Normal breath sounds, No respiratory distress, No wheezing, No chest tenderness. ,no use of accessory muscles, diaphragm movement is normal  Cardiovascular: (PMI) apex non displaced,no lifts no thrills, no s3,no s4, Normal heart rate, Normal rhythm, No murmurs, No rubs, No gallops. Carotid arteries pulse and amplitude are normal no bruit, no abdominal bruit noted ( normal abdominal aorta ausculation), femoral arteries pulse and amplitude are normal no bruit, pedal pulses are normal  GI:  Bowel sounds normal, Soft, No tenderness, No masses, No pulsatile masses. : External genitalia appear normal, No masses or lesions. No discharge. No CVA tenderness. Musculoskeletal:  Intact distal pulses, + edema, No tenderness, No cyanosis, No clubbing. Good range of motion in all major joints. No tenderness to palpation or major deformities noted. Back- No tenderness. Integument:  Warm, Dry, No erythema, No rash.    Lymphatic:  No lymphadenopathy noted. Neurologic:  Alert & oriented x 3, Normal motor function, Normal sensory function, No focal deficits noted. Psychiatric:  Affect normal, Judgment normal, Mood normal.     Medications:    levothyroxine  100 mcg Oral Daily    sodium chloride flush  5-40 mL Intravenous 2 times per day    enoxaparin  40 mg Subcutaneous Daily    sennosides-docusate sodium  1 tablet Oral BID    polyethylene glycol  17 g Oral BID    aspirin  325 mg Oral Daily    cefTRIAXone (ROCEPHIN) IV  1,000 mg Intravenous Q24H    midodrine  10 mg Oral TID WC      sodium chloride       sodium chloride flush, sodium chloride, promethazine **OR** ondansetron, acetaminophen **OR** acetaminophen, fleet    Lab Data:  CBC:   Recent Labs     05/31/21  1700 06/01/21  0106 06/01/21  0657   WBC 9.6 11.4* 13.4*   HGB 9.8* 11.1* 10.5*   HCT 31.8* 35.5* 34.8*   MCV 92.7 93.9 99.1    236 253     BMP:   Recent Labs     05/31/21  1700 06/01/21  0657 06/02/21  0435   * 141 136   K 3.9 3.9 4.7    109 110   CO2 20* 18* 15*   PHOS  --   --  5.5*   BUN 68* 62* 64*   CREATININE 1.0 0.9 1.2*     LIVER PROFILE:   Recent Labs     05/31/21  1700 06/01/21  0657 06/02/21  0435   AST 10* 10* 10*   ALT 9* 8* 6*   LIPASE 42  --   --    BILITOT 0.3 0.5 0.3   ALKPHOS 71 70 59     PT/INR: No results for input(s): PROTIME, INR in the last 72 hours. APTT: No results for input(s): APTT in the last 72 hours. BNP:  No results for input(s): BNP in the last 72 hours. TROPONIN: @TROPONINI:3@      Assessment:  78 y. o.year old who is admitted for          Plan:  1. Elevated trop: not an ACS, most likely from sepsis and pneumonia, will get EKG, recommend to add aspirin, statins, recommend to get CT Chest to check for pneumonia a  2. 'fecal impaction, colitis: as per medicine'  3. Hydonephrosis: as per medine  4. ARF: hold diruretic and add 250cc NS, further management as per nephrology  5.  Hypothyroidism: continue synthroid  All labs, medications and tests reviewed, continue all other medications of all above medical condition listed as is.       Judge Jennifer MD, MD 6/2/2021 9:02 AM

## 2021-06-02 NOTE — PROGRESS NOTES
[facility]   Progress Note  [facility] 0 1 2      Date: 2021   Patient: Tashia Smith   : 1940   DOA: 2021   MRN: [id number]   ROOM#: [id number]     Admit Date: 2021     Collaborating Urologist on Call at time of admission: Dr. Rosina Jo  CC: Constipation, AMS  Reason for Consult: Bilateral hydroureteronephrosis    Subjective:     Pain: mild, no nausea and no vomiting,   Bowel Movement/Flatus:   Passing flatus  Voidinfr solano catheter in place, urine clear dark yellow     Pt resting in bed, endorses pain/soreness on her bottom. Denies flank/abd pain.  Low BP overnight and this am.    Objective:    Vitals:    BP (!) 80/52   Pulse 100   Temp 98.2 °F (36.8 °C) (Oral)   Resp 19   Ht 5' 2\" (1.575 m)   Wt 193 lb 1.6 oz (87.6 kg)   SpO2 100%   BMI 35.32 kg/m²    Temp  Av.1 °F (36.7 °C)  Min: 97.8 °F (36.6 °C)  Max: 98.7 °F (37.1 °C)       Intake/Output Summary (Last 24 hours) at 2021 0737  Last data filed at 2021 1945  Gross per 24 hour   Intake 10 ml   Output 800 ml   Net -790 ml       Physical Exam:   General appearance: alert, appears stated age, cooperative, no distress, moderately obese and mildly confused  Head: Normocephalic, without obvious abnormality, atraumatic  Back: No CVA tenderness  Abdomen: Soft, non-distended, non-tender  : 16fr solano catheter in place, urine clear dark yellow    Labs:   WBC:    Lab Results   Component Value Date    WBC 13.4 2021      Hemoglobin/Hematocrit:    Lab Results   Component Value Date    HGB 10.5 2021    HCT 34.8 2021      BMP:   Lab Results   Component Value Date     2021    K 4.7 2021     2021    CO2 15 2021    BUN 64 2021    LABALBU 2.6 2021    CREATININE 1.2 2021    CALCIUM 7.7 2021    GFRAA 52 2021    GFRAA >60 2012    LABGLOM 43 2021    LABGLOM 42 2019     Blood Culture: NO GROWTH AT 24 HOURS Imaging:  CT HEAD WO CONTRAST    Result Date: 5/31/2021  EXAMINATION: CT OF THE HEAD WITHOUT CONTRAST  5/31/2021 7:02 pm TECHNIQUE: CT of the head was performed without the administration of intravenous contrast. Dose modulation, iterative reconstruction, and/or weight based adjustment of the mA/kV was utilized to reduce the radiation dose to as low as reasonably achievable. COMPARISON: 04/24/2021 HISTORY: ORDERING SYSTEM PROVIDED HISTORY: reported hallucinations and altered mental status TECHNOLOGIST PROVIDED HISTORY: Reason for exam:->reported hallucinations and altered mental status Reason for Exam: VERTIGO Relevant Medical/Surgical History: INJECTED PRIOR ON CT ABD/PEL THIS EVENING FINDINGS: BRAIN/VENTRICLES: There is no acute intracranial hemorrhage, mass effect or midline shift. No abnormal extra-axial fluid collection. The gray-white differentiation is maintained without evidence of an acute infarct. There is no evidence of hydrocephalus. Stable diffuse parenchymal volume loss with mild-to-moderate chronic white matter microvascular ischemic changes. ORBITS: The visualized portion of the orbits demonstrate no acute abnormality. SINUSES: The visualized paranasal sinuses and mastoid air cells demonstrate no acute abnormality. SOFT TISSUES/SKULL:  No acute abnormality of the visualized skull or soft tissues. 1. No acute intracranial abnormality. 2. Stable mild-to-moderate chronic white matter microvascular ischemic changes. CT ABDOMEN PELVIS W IV CONTRAST Additional Contrast? None    Result Date: 5/31/2021  EXAMINATION: CT OF THE ABDOMEN AND PELVIS WITH CONTRAST 5/31/2021 5:46 pm TECHNIQUE: CT of the abdomen and pelvis was performed with the administration of intravenous contrast. Multiplanar reformatted images are provided for review. Dose modulation, iterative reconstruction, and/or weight based adjustment of the mA/kV was utilized to reduce the radiation dose to as low as reasonably achievable. COMPARISON: 04/24/2021 HISTORY: ORDERING SYSTEM PROVIDED HISTORY: abdominal pain and distension, reports constipation, concern for bowel obstruction or other acute process TECHNOLOGIST PROVIDED HISTORY: Additional Contrast?->None Reason for exam:->abdominal pain and distension, reports constipation, concern for bowel obstruction or other acute process Decision Support Exception - unselect if not a suspected or confirmed emergency medical condition->Emergency Medical Condition (MA) Reason for Exam: distention, constipation FINDINGS: Lower Chest: There are at least moderate bilateral pleural effusions with adjacent consolidation in both lung bases. Coronary artery atherosclerosis. There is a 6 mm noncalcified right lower lobe nodule, similar to prior chest CT. Organs: The liver is cirrhotic in configuration with multiple calcified granulomas. Calcified granulomas are also noted in the spleen. Prior cholecystectomy with mild intra and extrahepatic biliary ductal dilation, similar in appearance to prior examination. No acute abnormality in the pancreas or adrenal glands. 1.1 cm left adrenal nodule is unchanged. No nephrolithiasis. There is bilateral hydroureteronephrosis extending to the level of the urinary bladder. GI/Bowel: The stomach is distended. The small bowel is nondilated. There is moderate stool within the distal colon and rectum with mild wall thickening and adjacent stranding. The colon is nondilated. The appendix is within normal limits. Pelvis: Urinary bladder is distended without wall thickening or vesicular stone. Prior hysterectomy. Peritoneum/Retroperitoneum: Small ascites. No pneumoperitoneum. Atherosclerosis of the nondilated abdominal aorta. Anasarca. Bones/Soft Tissues: There are multilevel degenerative changes of the lumbar spine. Height loss at L3 and L4 is similar in appearance to prior CT. No acute osseous abnormality.      1. Large amount of stool within the distal colon and rectum with mild wall thickening and adjacent stranding, concerning for constipation and stercoral colitis. 2. Urinary bladder distention with bilateral hydroureteronephrosis. There is no nephrolithiasis. Correlation for any signs or symptoms of bladder outlet obstruction is recommended. 3. Nodular hepatic contour, suggestive of cirrhosis. Small ascites. . 4. At least moderate bilateral pleural effusions with adjacent consolidation, likely atelectasis. 5. 1.1 cm indeterminate left adrenal nodule. Adrenal protocol MRI or CT in 1 year could provide further information as clinically indicated. XR CHEST PORTABLE    Result Date: 5/31/2021  EXAMINATION: ONE XRAY VIEW OF THE CHEST 5/31/2021 5:17 pm COMPARISON: 04/24/2021 HISTORY: ORDERING SYSTEM PROVIDED HISTORY: shortness of breath TECHNOLOGIST PROVIDED HISTORY: Reason for exam:->shortness of breath Reason for Exam: shortness of breath Acuity: Acute Type of Exam: Initial Additional signs and symptoms: na Relevant Medical/Surgical History: diabetes, hypertension FINDINGS: Left chest tube has been removed. Cardiac and mediastinal contours are stable. There is a new small layering right pleural effusion with right lung base atelectasis. There is no pneumothorax. The left lung is clear. Multiple healing bilateral rib fractures are redemonstrated. There is chronic deformity of the distal right clavicle. Left thyroidectomy surgical clips are present. New small layering right pleural effusion with right lung base atelectasis. Assessment & Plan: Gordo Wallace is a 78y.o. year old female admitted 5/31/2021 for hydroureteronephrosis     1) Mild Bilateral Hydroureteronephrosis              CT a/p 5/31/21: Urinary bladder distention with bilateral hydroureteronephrosis. There is no nephrolithiasis.  Large amount of stool within the distal colon and rectum with mild wall thickening and adjacent stranding, concerning for constipation and stercoral colitis. Cr 1.2              UA dip trace leuks, otherwise negative              Hydro likely secondary to distended bladder/constipation, but may have underlying bladder dysfunction/neurogenic bladder with h/o spinal stenosis; will evaluate further as outpatient. Recommend solano removal/voiding trial prior to discharge once constipation resolved.     Will continue to follow    Patient seen and examined, chart reviewed.      Electronically signed by Randi Batista PA-C on 6/2/2021 at 7:37 AM

## 2021-06-02 NOTE — PROGRESS NOTES
Patient requires continued admission due to weakness and hypotension   MDM [] Low, [] Moderate,[x]  High  Patient's risk as above due to weakness and hypotension     History of Present Illness:     Chief Complaint: Hydroureteronephrosis  Bhavik Ward is a 78 y.o.  female  who presents with abdominal pain and constipation and confusion. I have visited with the patient today, she tells me she feels a little better. She has no abdominal pain. She had a couple bowel movements. She denies chest pain. Breathing is at baseline. She remains weak. Denies headache or lightheadedness. Denies fever or chills. I discussed with the patient's that a lot of her multiple mobilities and weakness, it may be challenging for her family take care of her at home especially with her son working. Patient is interesting in skilled nursing facility placement upon discharge. Goals of care remain conservative approach and we await palliative care assessment. Labs reviewed from today showing creatinine 1.2. Potassium 4.7. Albumin is 2.6. Lactic acid is 1.9. Objective: Intake/Output Summary (Last 24 hours) at 6/2/2021 1640  Last data filed at 6/2/2021 1621  Gross per 24 hour   Intake 20 ml   Output 1250 ml   Net -1230 ml      Vitals:   Vitals:    06/02/21 1614   BP: (!) 81/52   Pulse: 101   Resp: 24   Temp:    SpO2:      Physical Exam:   GEN Awake female, sitting up in bed eating breakfast.  EYES Pupils are equally round. HENT Mucous membranes are moist. Oral pharynx without exudates, no evidence of thrush. NECK Supple, no apparent thyromegaly or masses. RESP decreased breath sounds at the bases. Breathing is unlabored at rest.  CARDIO/VASC S1/S2 auscultated. Regular rate and rhythm, there is evidence of +1 lower extremity edema at the ankles. GI Abdomen is soft without significant tenderness, masses, or guarding. Bowel sounds are normoactive. SKIN Normal coloration, warm, dry.   NEURO patient remains overall weak.  PSYCH Awake, alert, orientation is improved today, patient today is oriented to time place and person.     Medications:   Medications:    levothyroxine  100 mcg Oral Daily    sodium chloride flush  5-40 mL Intravenous 2 times per day    enoxaparin  40 mg Subcutaneous Daily    sennosides-docusate sodium  1 tablet Oral BID    polyethylene glycol  17 g Oral BID    aspirin  325 mg Oral Daily    cefTRIAXone (ROCEPHIN) IV  1,000 mg Intravenous Q24H    midodrine  10 mg Oral TID WC      Infusions:    sodium chloride       PRN Meds: sodium chloride flush, 5-40 mL, PRN  sodium chloride, 25 mL, PRN  promethazine, 12.5 mg, Q6H PRN   Or  ondansetron, 4 mg, Q6H PRN  acetaminophen, 650 mg, Q6H PRN   Or  acetaminophen, 650 mg, Q6H PRN  fleet, 1 enema, Daily PRN          Electronically signed by Claudell Roup, MD on 6/2/2021 at 4:40 PM

## 2021-06-02 NOTE — PROGRESS NOTES
Nephrology Progress Note  6/2/2021 5:05 PM        Subjective:   Admit Date: 5/31/2021  PCP: Raul Solitario MD    Interval History: seen in early am     Diet: / some per pt     ROS:  No overt sob , breathing better - no audible wheezing like  yesterday  Also no overt confusion or hallucination   UOP ? 800/ d     Data:     Current meds:    levothyroxine  100 mcg Oral Daily    sodium chloride flush  5-40 mL Intravenous 2 times per day    enoxaparin  40 mg Subcutaneous Daily    sennosides-docusate sodium  1 tablet Oral BID    polyethylene glycol  17 g Oral BID    aspirin  325 mg Oral Daily    cefTRIAXone (ROCEPHIN) IV  1,000 mg Intravenous Q24H    midodrine  10 mg Oral TID WC      sodium chloride           I/O last 3 completed shifts: In: 20 [I.V.:20]  Out: 800 [Urine:800]    CBC:   Recent Labs     05/31/21  1700 06/01/21  0106 06/01/21  0657   WBC 9.6 11.4* 13.4*   HGB 9.8* 11.1* 10.5*    236 253          Recent Labs     05/31/21  1700 06/01/21  0657 06/02/21  0435   * 141 136   K 3.9 3.9 4.7    109 110   CO2 20* 18* 15*   BUN 68* 62* 64*   CREATININE 1.0 0.9 1.2*   GLUCOSE 110* 104* 104*       Lab Results   Component Value Date    CALCIUM 7.7 (L) 06/02/2021    PHOS 5.5 (H) 06/02/2021       Objective:     Vitals: BP (!) 81/52   Pulse 101   Temp 97.6 °F (36.4 °C) (Oral)   Resp 24   Ht 5' 2\" (1.575 m)   Wt 193 lb 1.6 oz (87.6 kg)   SpO2 96%   BMI 35.32 kg/m²     General appearance:  No ac distress  HEENT:  ++ conj pallor  Neck:  suple  Lungs:  + crackles jaelyn at lung bases  Heart:  irregular  Abdomen: soft, + ascits  Extremities:  ++ LE edema   Roberts       Problem List :         Impression :     1. BALBIR by cr criteria- sec to hydro- fluid overload / ? Infection  Etc - hr urine was bland   2. ADHF sx wise stable   3. B/l hydro mainly from bladder obstruction  4. 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  ch DM/ BP lower side Recommendation/Plan  :     1. She  Has  asymptomatic hypotension besides BP may need be  accurate   2. Suggest loop- preferably long acting such as torsemide perhaps start 20 BID and slowly build up as tolerate along with MRA - once K < 4   3. Low salt  4. Daily wt  5.  Follow clinically       Farnaz Goldman MD MD

## 2021-06-02 NOTE — PROGRESS NOTES
Fluid bolus completed, patient still hypotensive with BP 13H-20W systolic and MAP in 32K-23W. Dr Marco A Gonzalez notified, no more fluids at this time due to fluid overload. Dr Marco A Gonzalez states he is okay with MAP 50 at this time. Patient asymptomatic, resting, alert, and watching TV. Reports no dizziness or any other symptoms. Will continue to monitor.

## 2021-06-02 NOTE — PROGRESS NOTES
Occupational Therapy    Attempted at 1406 hrs c chart review c most recent note 6/2 from nursing indicating that pt has had fluid bolus, still remaining hypotensive c BP 47-72U systolic and MAP in 43X-54A. Physician has been notified. Will continue per OT POC c re-attempt as schedule allows.        Electronically signed by:    ZONIA Arizmendi  6/2/2021, 2:06 PM

## 2021-06-02 NOTE — PROGRESS NOTES
9:01 Dr Ciara Mendez came out to nurses station as patients BP was in the 70's. 9:04 BP 72/48, patient placed in [place] and orders received to give 250cc fluid bolus. Fluid bolus started. 9:06 BP 78/58  9:09 BP 85/54 patient states needs to have a BM, Nurse Joe Guthrie in room and Nurse Cecile Mujica in room. Patient placed on bedpan.

## 2021-06-03 LAB
ANION GAP SERPL CALCULATED.3IONS-SCNC: 14 MMOL/L (ref 4–16)
BASE EXCESS: 7 (ref 0–2.4)
BASOPHILS ABSOLUTE: 0 K/CU MM
BASOPHILS RELATIVE PERCENT: 0.3 % (ref 0–1)
BUN BLDV-MCNC: 63 MG/DL (ref 6–23)
CALCIUM SERPL-MCNC: 7.3 MG/DL (ref 8.3–10.6)
CHLORIDE BLD-SCNC: 108 MMOL/L (ref 99–110)
CO2: 17 MMOL/L (ref 21–32)
COMMENT: ABNORMAL
CREAT SERPL-MCNC: 1.2 MG/DL (ref 0.6–1.1)
DIFFERENTIAL TYPE: ABNORMAL
EKG ATRIAL RATE: 98 BPM
EKG DIAGNOSIS: NORMAL
EKG P AXIS: 53 DEGREES
EKG P-R INTERVAL: 196 MS
EKG Q-T INTERVAL: 400 MS
EKG QRS DURATION: 128 MS
EKG QTC CALCULATION (BAZETT): 510 MS
EKG R AXIS: 44 DEGREES
EKG T AXIS: 19 DEGREES
EKG VENTRICULAR RATE: 98 BPM
EOSINOPHILS ABSOLUTE: 0.7 K/CU MM
EOSINOPHILS RELATIVE PERCENT: 4.8 % (ref 0–3)
GFR AFRICAN AMERICAN: 52 ML/MIN/1.73M2
GFR NON-AFRICAN AMERICAN: 43 ML/MIN/1.73M2
GLUCOSE BLD-MCNC: 104 MG/DL (ref 70–99)
HCO3 VENOUS: 17.7 MMOL/L (ref 19–25)
HCT VFR BLD CALC: 32.9 % (ref 37–47)
HEMOGLOBIN: 9.8 GM/DL (ref 12.5–16)
IMMATURE NEUTROPHIL %: 0.6 % (ref 0–0.43)
LACTATE: 1.1 MMOL/L (ref 0.4–2)
LYMPHOCYTES ABSOLUTE: 0.5 K/CU MM
LYMPHOCYTES RELATIVE PERCENT: 3.4 % (ref 24–44)
MCH RBC QN AUTO: 29.6 PG (ref 27–31)
MCHC RBC AUTO-ENTMCNC: 29.8 % (ref 32–36)
MCV RBC AUTO: 99.4 FL (ref 78–100)
MONOCYTES ABSOLUTE: 0.9 K/CU MM
MONOCYTES RELATIVE PERCENT: 6.5 % (ref 0–4)
NUCLEATED RBC %: 0 %
O2 SAT, VEN: 77.9 % (ref 50–70)
PCO2, VEN: 32 MMHG (ref 38–52)
PDW BLD-RTO: 16.1 % (ref 11.7–14.9)
PH VENOUS: 7.35 (ref 7.32–7.42)
PLATELET # BLD: 274 K/CU MM (ref 140–440)
PMV BLD AUTO: 9.5 FL (ref 7.5–11.1)
PO2, VEN: 43 MMHG (ref 28–48)
POTASSIUM SERPL-SCNC: 4.3 MMOL/L (ref 3.5–5.1)
PROCALCITONIN: 0.3
RBC # BLD: 3.31 M/CU MM (ref 4.2–5.4)
SEGMENTED NEUTROPHILS ABSOLUTE COUNT: 12 K/CU MM
SEGMENTED NEUTROPHILS RELATIVE PERCENT: 84.4 % (ref 36–66)
SODIUM BLD-SCNC: 139 MMOL/L (ref 135–145)
TOTAL IMMATURE NEUTOROPHIL: 0.08 K/CU MM
TOTAL NUCLEATED RBC: 0 K/CU MM
TROPONIN T: 0.04 NG/ML
WBC # BLD: 14.3 K/CU MM (ref 4–10.5)

## 2021-06-03 PROCEDURE — 94761 N-INVAS EAR/PLS OXIMETRY MLT: CPT

## 2021-06-03 PROCEDURE — 1200000000 HC SEMI PRIVATE

## 2021-06-03 PROCEDURE — 6370000000 HC RX 637 (ALT 250 FOR IP): Performed by: STUDENT IN AN ORGANIZED HEALTH CARE EDUCATION/TRAINING PROGRAM

## 2021-06-03 PROCEDURE — 36415 COLL VENOUS BLD VENIPUNCTURE: CPT

## 2021-06-03 PROCEDURE — 2580000003 HC RX 258: Performed by: INTERNAL MEDICINE

## 2021-06-03 PROCEDURE — 6360000002 HC RX W HCPCS: Performed by: STUDENT IN AN ORGANIZED HEALTH CARE EDUCATION/TRAINING PROGRAM

## 2021-06-03 PROCEDURE — 83605 ASSAY OF LACTIC ACID: CPT

## 2021-06-03 PROCEDURE — 85025 COMPLETE CBC W/AUTO DIFF WBC: CPT

## 2021-06-03 PROCEDURE — 80048 BASIC METABOLIC PNL TOTAL CA: CPT

## 2021-06-03 PROCEDURE — 6360000002 HC RX W HCPCS: Performed by: INTERNAL MEDICINE

## 2021-06-03 PROCEDURE — 84145 PROCALCITONIN (PCT): CPT

## 2021-06-03 PROCEDURE — 6370000000 HC RX 637 (ALT 250 FOR IP): Performed by: INTERNAL MEDICINE

## 2021-06-03 PROCEDURE — 97530 THERAPEUTIC ACTIVITIES: CPT

## 2021-06-03 PROCEDURE — 82805 BLOOD GASES W/O2 SATURATION: CPT

## 2021-06-03 PROCEDURE — 99222 1ST HOSP IP/OBS MODERATE 55: CPT | Performed by: OBSTETRICS & GYNECOLOGY

## 2021-06-03 PROCEDURE — 97535 SELF CARE MNGMENT TRAINING: CPT

## 2021-06-03 PROCEDURE — 93010 ELECTROCARDIOGRAM REPORT: CPT | Performed by: INTERNAL MEDICINE

## 2021-06-03 PROCEDURE — 2580000003 HC RX 258: Performed by: STUDENT IN AN ORGANIZED HEALTH CARE EDUCATION/TRAINING PROGRAM

## 2021-06-03 PROCEDURE — 84484 ASSAY OF TROPONIN QUANT: CPT

## 2021-06-03 RX ADMIN — DOCUSATE SODIUM 50 MG AND SENNOSIDES 8.6 MG 1 TABLET: 8.6; 5 TABLET, FILM COATED ORAL at 09:48

## 2021-06-03 RX ADMIN — MIDODRINE HYDROCHLORIDE 10 MG: 5 TABLET ORAL at 12:45

## 2021-06-03 RX ADMIN — MIDODRINE HYDROCHLORIDE 10 MG: 5 TABLET ORAL at 09:48

## 2021-06-03 RX ADMIN — POLYETHYLENE GLYCOL (3350) 17 G: 17 POWDER, FOR SOLUTION ORAL at 21:04

## 2021-06-03 RX ADMIN — SODIUM CHLORIDE, PRESERVATIVE FREE 10 ML: 5 INJECTION INTRAVENOUS at 21:05

## 2021-06-03 RX ADMIN — ASPIRIN 325 MG: 325 TABLET, COATED ORAL at 09:48

## 2021-06-03 RX ADMIN — MIDODRINE HYDROCHLORIDE 10 MG: 5 TABLET ORAL at 17:43

## 2021-06-03 RX ADMIN — CEFTRIAXONE 1000 MG: 1 INJECTION, POWDER, FOR SOLUTION INTRAMUSCULAR; INTRAVENOUS at 15:01

## 2021-06-03 RX ADMIN — DOCUSATE SODIUM 50 MG AND SENNOSIDES 8.6 MG 1 TABLET: 8.6; 5 TABLET, FILM COATED ORAL at 21:04

## 2021-06-03 RX ADMIN — SODIUM CHLORIDE, PRESERVATIVE FREE 10 ML: 5 INJECTION INTRAVENOUS at 09:49

## 2021-06-03 RX ADMIN — ENOXAPARIN SODIUM 40 MG: 40 INJECTION SUBCUTANEOUS at 09:48

## 2021-06-03 RX ADMIN — LEVOTHYROXINE SODIUM 100 MCG: 0.1 TABLET ORAL at 09:48

## 2021-06-03 ASSESSMENT — PAIN SCALES - GENERAL
PAINLEVEL_OUTOF10: 0

## 2021-06-03 NOTE — PROGRESS NOTES
[facility]   Progress Note  [facility] 0 1 2      Date: 6/3/2021   Patient: Demetrio Christine   : 1940   DOA: 2021   MRN: [id number]   ROOM#: [id number]     Admit Date: 2021     Collaborating Urologist on Call at time of admission: Dr. Gabriele Kwok  CC: Constipation, AMS  Reason for Consult: Bilateral hydroureteronephrosis    Subjective:     Pain: mild, no nausea and no vomiting,   Bowel Movement/Flatus:   Passing flatus  Voidinfr solano catheter in place, urine clear yellow     Pt resting in bed, states she is feeling well. Denies flank/abd pain.     Objective:    Vitals:    BP (!) 71/53   Pulse 98   Temp 97.9 °F (36.6 °C) (Oral)   Resp 14   Ht 5' 2\" (1.575 m)   Wt 193 lb 1.6 oz (87.6 kg)   SpO2 96%   BMI 35.32 kg/m²    Temp  Av °F (36.7 °C)  Min: 97.6 °F (36.4 °C)  Max: 98.1 °F (36.7 °C)       Intake/Output Summary (Last 24 hours) at 6/3/2021 0758  Last data filed at 6/3/2021 7624  Gross per 24 hour   Intake 10 ml   Output 750 ml   Net -740 ml       Physical Exam:   General appearance: alert, appears stated age, cooperative, no distress, moderately obese and mildly confused  Head: Normocephalic, without obvious abnormality, atraumatic  Back: No CVA tenderness  Abdomen: Soft, non-distended, non-tender  : 16fr solano catheter in place, urine clear yellow    Labs:   WBC:    Lab Results   Component Value Date    WBC 14.3 2021      Hemoglobin/Hematocrit:    Lab Results   Component Value Date    HGB 9.8 2021    HCT 32.9 2021      BMP:   Lab Results   Component Value Date     2021    K 4.3 2021     2021    CO2 17 2021    BUN 63 2021    LABALBU 2.6 2021    CREATININE 1.2 2021    CALCIUM 7.3 2021    GFRAA 52 2021    GFRAA >60 2012    LABGLOM 43 2021    LABGLOM 42 2019     Blood Culture: NO GROWTH AT 48 HOURS     Imaging:  CT HEAD WO CONTRAST    Result Date: 5/31/2021  EXAMINATION: CT OF THE HEAD WITHOUT CONTRAST  5/31/2021 7:02 pm TECHNIQUE: CT of the head was performed without the administration of intravenous contrast. Dose modulation, iterative reconstruction, and/or weight based adjustment of the mA/kV was utilized to reduce the radiation dose to as low as reasonably achievable. COMPARISON: 04/24/2021 HISTORY: ORDERING SYSTEM PROVIDED HISTORY: reported hallucinations and altered mental status TECHNOLOGIST PROVIDED HISTORY: Reason for exam:->reported hallucinations and altered mental status Reason for Exam: VERTIGO Relevant Medical/Surgical History: INJECTED PRIOR ON CT ABD/PEL THIS EVENING FINDINGS: BRAIN/VENTRICLES: There is no acute intracranial hemorrhage, mass effect or midline shift. No abnormal extra-axial fluid collection. The gray-white differentiation is maintained without evidence of an acute infarct. There is no evidence of hydrocephalus. Stable diffuse parenchymal volume loss with mild-to-moderate chronic white matter microvascular ischemic changes. ORBITS: The visualized portion of the orbits demonstrate no acute abnormality. SINUSES: The visualized paranasal sinuses and mastoid air cells demonstrate no acute abnormality. SOFT TISSUES/SKULL:  No acute abnormality of the visualized skull or soft tissues. 1. No acute intracranial abnormality. 2. Stable mild-to-moderate chronic white matter microvascular ischemic changes. CT ABDOMEN PELVIS W IV CONTRAST Additional Contrast? None    Result Date: 5/31/2021  EXAMINATION: CT OF THE ABDOMEN AND PELVIS WITH CONTRAST 5/31/2021 5:46 pm TECHNIQUE: CT of the abdomen and pelvis was performed with the administration of intravenous contrast. Multiplanar reformatted images are provided for review. Dose modulation, iterative reconstruction, and/or weight based adjustment of the mA/kV was utilized to reduce the radiation dose to as low as reasonably achievable.  COMPARISON: 04/24/2021 HISTORY: [address] PROVIDED HISTORY: abdominal pain and distension, reports constipation, concern for bowel obstruction or other acute process TECHNOLOGIST PROVIDED HISTORY: Additional Contrast?->None Reason for exam:->abdominal pain and distension, reports constipation, concern for bowel obstruction or other acute process Decision Support Exception - unselect if not a suspected or confirmed emergency medical condition->Emergency Medical Condition (MA) Reason for Exam: distention, constipation FINDINGS: Lower Chest: There are at least moderate bilateral pleural effusions with adjacent consolidation in both lung bases. Coronary artery atherosclerosis. There is a 6 mm noncalcified right lower lobe nodule, similar to prior chest CT. Organs: The liver is cirrhotic in configuration with multiple calcified granulomas. Calcified granulomas are also noted in the spleen. Prior cholecystectomy with mild intra and extrahepatic biliary ductal dilation, similar in appearance to prior examination. No acute abnormality in the pancreas or adrenal glands. 1.1 cm left adrenal nodule is unchanged. No nephrolithiasis. There is bilateral hydroureteronephrosis extending to the level of the urinary bladder. GI/Bowel: The stomach is distended. The small bowel is nondilated. There is moderate stool within the distal colon and rectum with mild wall thickening and adjacent stranding. The colon is nondilated. The appendix is within normal limits. Pelvis: Urinary bladder is distended without wall thickening or vesicular stone. Prior hysterectomy. Peritoneum/Retroperitoneum: Small ascites. No pneumoperitoneum. Atherosclerosis of the nondilated abdominal aorta. Anasarca. Bones/Soft Tissues: There are multilevel degenerative changes of the lumbar spine. Height loss at L3 and L4 is similar in appearance to prior CT. No acute osseous abnormality.      1. Large amount of stool within the distal colon and rectum with mild wall thickening and adjacent stranding, concerning for constipation and stercoral colitis. 2. Urinary bladder distention with bilateral hydroureteronephrosis. There is no nephrolithiasis. Correlation for any signs or symptoms of bladder outlet obstruction is recommended. 3. Nodular hepatic contour, suggestive of cirrhosis. Small ascites. . 4. At least moderate bilateral pleural effusions with adjacent consolidation, likely atelectasis. 5. 1.1 cm indeterminate left adrenal nodule. Adrenal protocol MRI or CT in 1 year could provide further information as clinically indicated. XR CHEST PORTABLE    Result Date: 5/31/2021  EXAMINATION: ONE XRAY VIEW OF THE CHEST 5/31/2021 5:17 pm COMPARISON: 04/24/2021 HISTORY: ORDERING SYSTEM PROVIDED HISTORY: shortness of breath TECHNOLOGIST PROVIDED HISTORY: Reason for exam:->shortness of breath Reason for Exam: shortness of breath Acuity: Acute Type of Exam: Initial Additional signs and symptoms: na Relevant Medical/Surgical History: diabetes, hypertension FINDINGS: Left chest tube has been removed. Cardiac and mediastinal contours are stable. There is a new small layering right pleural effusion with right lung base atelectasis. There is no pneumothorax. The left lung is clear. Multiple healing bilateral rib fractures are redemonstrated. There is chronic deformity of the distal right clavicle. Left thyroidectomy surgical clips are present. New small layering right pleural effusion with right lung base atelectasis. Assessment & Plan: Nishi Villanueva is a 78y.o. year old female admitted 5/31/2021 for hydroureteronephrosis     1) Mild Bilateral Hydroureteronephrosis              CT a/p 5/31/21: Urinary bladder distention with bilateral hydroureteronephrosis. There is no nephrolithiasis. Large amount of stool within the distal colon and rectum with mild wall thickening and adjacent stranding, concerning for constipation and stercoral colitis.               Cr 1.2              UA dip trace leuks, otherwise negative              Hydro likely secondary to distended bladder/constipation, but may have underlying bladder dysfunction/neurogenic bladder with h/o spinal stenosis; will evaluate further as outpatient. Recommend solano removal/voiding trial prior to discharge once constipation resolved.     Pt stable from a  standpoint. Will sign off, please call with any questions. Pt to follow up in our office in 2 weeks with a HAZEL. Patient seen and examined, chart reviewed.      Electronically signed by Saumya Mathew PA-C on 6/3/2021 at 7:58 AM

## 2021-06-03 NOTE — PROGRESS NOTES
4. Follow clinically  5. My understanding she will go home with [facility] / hospice etc   6.  Follow clinically  7. conservative care       Farnaz Goldman MD MD

## 2021-06-03 NOTE — CARE COORDINATION
CM in to see Pt to follow up on discharge planning. Pt states she is agreeable to what her son thinks is best stating she is \"unable to make decisions right now\". CM spoke with Pt son Juan Garcias and Dr. Luke Guido is agreeable to hospice consult. 12:36 PM   CM made referral to [facility], spoke with Renetta Antonio.       CM following

## 2021-06-03 NOTE — PROGRESS NOTES
Hospitalist Progress Note      Name:  Brett Campbell /Age/Sex: 1940  (78 y.o. female)   MRN & CSN:  [id number] & [id number] Admission Date/Time: 2021  4:35 PM   Location:  [id number]- PCP: Kathy Crowell MD         Hospital Day: 4    Assessment and Plan: Brett Campbell is a 78 y.o.  female  who presents with Hydroureteronephrosis    Acute metabolic encephalopathy. Mental status fluctuates, currently appropriate. Sepsis. Still with hypotension and leukocytosis. On Abx. Mutiple organ failure with evidence of hypotension, possible cirrhosis, malnutrition, encephalopathy, profound weakness, and CHF. Constipation: improved. Bilateral hydronephrosis, cont with Solano catheter. Hypotension, persistent. Chronic diastolic heart failure, remains with edema and pleural effusions. Weakness, unable to participate with Pt/Ot d/t hypotension. Possible liver cirrhosis    Overall patient is very ill, and not improving. In light of the multiple issues above and her and family wishes, we will pursue comfort measures care only. Diet DIET RENAL;   DVT Prophylaxis [] Lovenox, []  Heparin, [] SCDs, [] Ambulation   GI Prophylaxis [] PPI,  [] H2 Blocker,  [] Carafate,  [] Diet/Tube Feeds   Code Status DNR-CC   Disposition Patient requires continued admission due to multiple organ failure   MDM [] Low, [] Moderate,[x]  High  Patient's risk as above due to above     History of Present Illness:     Chief Complaint: Hydroureteronephrosis  Brett Campbell is a 78 y.o.  female  who presents with abdominal pain and constipation and confusion. Patient was treated with IVF, laxatives and antibiotics, and solano catheter. She moved her bowels. However, her blood pressure has been persistently low now for >24 hours. This is likely d/t multiple issues including possible infection and sepsis, CHF, and malnutrition and low albumin state and liver cirrhosis.   I visited with patient today, called her son Arben Kemp and spoke with Dr. Kiana Coronado from palliative care. Patient reports feeling about same, she is weak. Denies chest pain. Denies shortness of breath at rest. No abdominal pain or n/v. Had a BM yesterday. In light of her overall condition, and multiple organ failure, and considering her wishes along with her family, the plan now is to pursue full comfort measures only. Code status is changed to DNR. Will c/s hospice to help with d/c planning for home with hospice. Will stop further labs and imaging. 40 minutes spent with patient today, >50% counseling her and son about condition and coordinating her care. Objective: Intake/Output Summary (Last 24 hours) at 6/3/2021 1100  Last data filed at 6/3/2021 0949  Gross per 24 hour   Intake 430 ml   Output 750 ml   Net -320 ml      Vitals:   Vitals:    06/03/21 0942   BP:    Pulse: 84   Resp: 23   Temp: 97.9 °F (36.6 °C)   SpO2: 100%     Physical Exam:   GEN Awake female, weak. HENT Mucous membranes are moist.   NECK Supple, no apparent thyromegaly or masses. RESP Decreased BS at bases, unlabored at rest.  CARDIO/VASC S1/S2 auscultated. RRR. +1 edema at ankles, no JVD  GI Abdomen is soft, NT, ND.   NEURO awake, weak overall.     Medications:   Medications:    levothyroxine  100 mcg Oral Daily    sodium chloride flush  5-40 mL Intravenous 2 times per day    enoxaparin  40 mg Subcutaneous Daily    sennosides-docusate sodium  1 tablet Oral BID    polyethylene glycol  17 g Oral BID    aspirin  325 mg Oral Daily    cefTRIAXone (ROCEPHIN) IV  1,000 mg Intravenous Q24H    midodrine  10 mg Oral TID WC      Infusions:    sodium chloride       PRN Meds: sodium chloride flush, 5-40 mL, PRN  sodium chloride, 25 mL, PRN  promethazine, 12.5 mg, Q6H PRN   Or  ondansetron, 4 mg, Q6H PRN  acetaminophen, 650 mg, Q6H PRN   Or  acetaminophen, 650 mg, Q6H PRN  fleet, 1 enema, Daily PRN    Labs reviewed and show HG 9.8, WBC 14.3k, CO2 17, Cr 1.2      Electronically signed by Chelly Hernandez MD Nilam on 6/3/2021 at 11:00 AM

## 2021-06-03 NOTE — PROGRESS NOTES
Occupational Therapy      Occupational Therapy Treatment Note      Name: Bhavik Ward MRN: [id number] :   1940   Date:  6/3/2021   Admission Date: 2021 Room:  [facility]     Primary Problem:  hydroureteronephrosis    Restrictions/Precautions:  Restrictions/Precautions  Restrictions/Precautions: General Precautions, Fall Risk     Communication with other providers:  Araceli LEONARDO    Subjective:  Patient states: \"I don't know if I can stand. \"  Pain: n/a    Objective:    Observation:  Pt supine in bed, Pt agreeable to therapy. Treatment, including education:  Sup-sit: maxA  Sitting balance: Cece  Sit-stand: maxA  SPT: maxA    Pt completed oral hygiene and combing hair seated in chair grooming supervision. Pt and dgt educated on benefits of inc movement and positioning with presence of ulcers. Assessment / Impression:    Patient's tolerance of treatment: Pt tolerated treatment well  Adverse Reaction: n/a  Significant change in status and impact:  n/a  Barriers to improvement: n/a      Plan for Next Session:    Pt will continue to perform functional activities to inc strength and activity tolerance.     Time in:  1525  Time out:  1551  Timed treatment minutes:  26  Total treatment time:  26      Electronically signed by:    Zuleyka SCHUSTER/MALISSA,   6/3/2021, 3:55 PM

## 2021-06-03 NOTE — CONSULTS
Palliative Medicine Consultation    Reason for Consult:      _X____ Advance Care Planning  _X____Transition of Care Planning  _X____ Psychosocial Support  _____ Symptom Management:     Recommendations:    1. Continue with the excellent care of this patient with mental status changes and other co-morbidities. 2.  DNRCC  3. PT would be very helpful for this patient. She is tired of being in bed  4. We will continue to follow for goals of care and support. Requesting Physician:  Dr. Jakob Shearer:  Abdominal pain and constipation    History Obtained From:  patient, family member - son Carlos Bella, electronic medical record, Dr. Greta Oliveross:    78year old female who presented to the ED with mental status changes, abdominal pain, and constipation. Patient was at an ECF and having hallucinations per the staff. Patient had severe fluid overload with underlying NAFLD/cirrhosis and hypotension. She was found to have a distended bladder, mild hydroureteronephrosis, and large stool burden on CT. According to her son, Carlos Bella, the patient mental capacity has decreased over the past few months. 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 The patient has past medical history significant for spinal stenosis, chronic diastolic heart failure, hypertension, DM II, and hypothyroidism. Palliative Care was asked to see the patient and family for goals of care and support.               Past Medical History:        Diagnosis Date    Family history of abdominal aortic aneurysm     CT Abd Aorta 10/2007 - normal aorta    Family history of colon cancer     Colonoscopy due 1/2013    Fracture of left humerus 3/2014    Carozza    Hypertension     Hypothyroidism, postsurgical 1997    Left Thyroid Lobectomy    Lumbar Spinal Stenosis 2006    severe    Multinodular goiter 1996    suppresion and bx negative; s/p thyroid lobectomy    CHAUDHRY (nonalcoholic steatohepatitis) 2005    chaudhry vs cirrhosis;  Dr. 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 when    Anal Fissure Repair    CARPAL TUNNEL RELEASE Right 3/18/2019    RIGHT CARPAL TUNNEL RELEASE performed by Raul Sr MD at [address] Bilateral 10+ yrs ago    CHOLECYSTECTOMY, [address]  last one 5+ ys ago     ELBOW FRACTURE SURGERY Left 1/2011    ORIF    KNEE ARTHROSCOPY Right 2008    ORIF HUMERUS DECOMPRESSION Right 12/2006    right humeral fracture    SHOULDER ARTHROSCOPY Right ?when    JANA AND BSO      \"sometime in my 50's\"    THYROID LOBECTOMY Left 1995    TONSILLECTOMY  as a child       Current Medications:    Current Facility-Administered Medications: levothyroxine (SYNTHROID) tablet 100 mcg, 100 mcg, Oral, Daily  sodium chloride flush 0.9 % injection 5-40 mL, 5-40 mL, Intravenous, 2 times per day  sodium chloride flush 0.9 % injection 5-40 mL, 5-40 mL, Intravenous, PRN  0.9 % sodium chloride infusion, 25 mL, Intravenous, PRN  enoxaparin (LOVENOX) injection 40 mg, 40 mg, Subcutaneous, Daily  promethazine (PHENERGAN) tablet 12.5 mg, 12.5 mg, Oral, Q6H PRN **OR** ondansetron (ZOFRAN) injection 4 mg, 4 mg, Intravenous, Q6H PRN  acetaminophen (TYLENOL) tablet 650 mg, 650 mg, Oral, Q6H PRN **OR** acetaminophen (TYLENOL) suppository 650 mg, 650 mg, Rectal, Q6H PRN  sennosides-docusate sodium (SENOKOT-S) 8.6-50 MG tablet 1 tablet, 1 tablet, Oral, BID  fleet rectal enema 1 enema, 1 enema, Rectal, Daily PRN  polyethylene glycol (GLYCOLAX) packet 17 g, 17 g, Oral, BID  aspirin EC tablet 325 mg, 325 mg, Oral, Daily  cefTRIAXone (ROCEPHIN) 1000 mg IVPB in 50 mL D5W minibag, 1,000 mg, Intravenous, Q24H  midodrine (PROAMATINE) tablet 10 mg, 10 mg, Oral, TID WC    Allergies:  Penicillins    Social History:    Was residing in assisted living.  with one living son. Family History:       Problem Relation Age of Onset    Other Father         heart issues    Stroke Father     Heart Disease Brother     Other Brother         hx of heart cath with blockage    Heart Surgery Brother     Heart Surgery Brother     Cancer Brother 68        lung    Other Son         knee and multiple issues       REVIEW OF SYSTEMS:    CONSTITUTIONAL:  positive for  fatigue, anorexia and weight loss  EYES:  negative for  visual disturbance and irritation  HEENT:  negative for  hearing loss, nasal congestion and sore throat  RESPIRATORY:  negative for  dry cough and dyspnea  CARDIOVASCULAR:  negative for  chest pain, palpitations, orthopnea  GASTROINTESTINAL:  positive for constipation, abdominal pain and abdominal distention  GENITOURINARY:  negative for frequency and dysuria  MUSCULOSKELETAL:  positive for  muscle weakness  NEUROLOGICAL:  negative for headaches and dizziness  BEHAVIOR/PSYCH:  positive for depressed mood, fatigue and anxiety    Vitals:    Vitals:    06/03/21 0942   BP:    Pulse: 84   Resp: 23   Temp: 97.9 °F (36.6 °C)   SpO2: 100%       PHYSICAL EXAM:  No visitors present  GENERAL: lying in bed in NAD  HEENT: No cervical adenopathy, MM moist, PERRL  COR: RRR, no murmurs noted  LUNGS: CTA  ABD: softly distended, +BS, NT  SKIN: no rashes  PSYCH: confused at times and distressed over this.   NEURO: CN II-XII grossly intact    DATA:    CBC:   Lab Results   Component Value Date    WBC 14.3 06/03/2021    RBC 3.31 06/03/2021    HGB 9.8 06/03/2021    HCT 32.9 2021    MCV 99.4 2021    MCH 29.6 2021    MCHC 29.8 2021    RDW 16.1 2021     2021    MPV 9.5 2021     CMP:    Lab Results   Component Value Date     2021    K 4.3 2021     2021    CO2 17 2021    BUN 63 2021    CREATININE 1.2 2021    GFRAA 52 2021    GFRAA >60 2012    AGRATIO 2.3 2019    LABGLOM 43 2021    LABGLOM 42 2019    GLUCOSE 104 2021    GLUCOSE 129 2012    PROT 4.4 2021    PROT 6.4 2013    LABALBU 2.6 2021    CALCIUM 7.3 2021    BILITOT 0.3 2021    ALKPHOS 59 2021    AST 10 2021    ALT 6 2021     Albumin:    Lab Results   Component Value Date    LABALBU 2.6 2021     CT ABD/Pelvis:  1. Large amount of stool within the distal colon and rectum with mild wall   thickening and adjacent stranding, concerning for constipation and stercoral   colitis. 2. Urinary bladder distention with bilateral hydroureteronephrosis. Jerilynn Chio is   no nephrolithiasis.  Correlation for any signs or symptoms of bladder outlet   obstruction is recommended. 3. Nodular hepatic contour, suggestive of cirrhosis.  Small ascites. .   4. At least moderate bilateral pleural effusions with adjacent consolidation,   likely atelectasis. 5. 1.1 cm indeterminate left adrenal nodule.  Adrenal protocol MRI or CT in 1   year could provide further information as clinically indicated. ECHO:   Summary   Left ventricular systolic function is normal.   Ejection fraction is visually estimated at 50-55%. Mild left ventricular hypertrophy. Mitral annular calcification with mild mitral stenosis; mean PmmHg. No evidence of any pericardial effusion. Right pleural effusion. IMPRESSION:    78year old female with mental status changes, hypotension, diastolic heart failure for Palliative Care encounter.   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 Her   after 13 years of marriage. She has noticed her memory become worse and is very distressed over this. She can' remember when she ate or if she had a BM. She doesn't remember where she was living and where she came from. She does state that she has advance directives and does not want aggressive interventions with an acute event. No CPR, defibrillation, intubation, or medications. She wants to be peaceful and comfortable. She is concerned about going to her son's home at discharge as she doesn't want to be a burden to him. I spoke with Rainell Heimlich, her son and he agrees with comfort care. He states that the family can not afford to put the patient in an ECF and plan to take her home with some help. Palliative Care will follow for goals of care and support. The patient and/or family were seen for greater than 80 minutes and greater than 50% was in counseling and coordination of care.     Electronically signed by Chanel Vernon MD on 6/3/2021 at 10:13 AM

## 2021-06-03 NOTE — PROGRESS NOTES
Occupational Therapy  Occupational Therapy Treatment Note    Date:  6/3/2021   Room:  [facility]    Danita Erazo :   1940   MRN: [id number] Admission Date: 2021     Diagnosis:  The primary encounter diagnosis was Altered mental status, unspecified altered mental status type. Diagnoses of Constipation, unspecified constipation type and Hypotension, unspecified hypotension type were also pertinent to this visit. Restrictions/Precautions:    Restrictions/Precautions  Restrictions/Precautions: General Precautions, Fall Risk                 Communication with other providers:  RN, PT. Subjective:  Patient states:  \"I can try\"  Pain:   Location, Type, Intensity (0/10 to 10/10):  No c/o    Objective:    Orientation: ox self and place  Observation: Pt received in bed. Alert and cooperative. Objective Measures: hypotensive    Treatment, including education:  Therapeutic Activity Training:   Therapeutic activity training was instructed today. Cues were given for safety, sequence, UE/LE placement, visual cues, and balance. Self Care Training:   Self care training was performed today. Cues were given for safety, sequence, UE/LE placement, visual cues, and balance. Supine to sit: MaxA    Sitting: Supervision EOB ~4 mins. Sit to stand: deferred    Sit to supine: MaxA    Assessment / Impression:      Patient's tolerance of treatment:  fair   Significant change in status and impact:  Hypotensive today  Recommendations: plan is home with hospice. Recommend hospital bed, george,  for bed level ADL    Plan for Next Session:    Cont POC addressing goals:    POC will be reduced to 1x/wk due to new d/c plan of home with hospice     Goals:  By d/c or goals met:  Pt will perform all bed mobility with Felipe in prep for EOB/OOB activity. Pt will perform all functional transfers with Felipe and appropriate use of LRD to bed, toilet, chair in prep for increased functional independence.    Pt will perform

## 2021-06-04 PROBLEM — G93.41 ACUTE METABOLIC ENCEPHALOPATHY: Status: ACTIVE | Noted: 2021-06-04

## 2021-06-04 PROBLEM — I50.32 CHRONIC DIASTOLIC HEART FAILURE (HCC): Status: ACTIVE | Noted: 2021-06-04

## 2021-06-04 PROBLEM — I50.9 MULTIPLE ORGAN FAILURE WITH HEART FAILURE (HCC): Status: ACTIVE | Noted: 2021-06-04

## 2021-06-04 PROBLEM — A41.9 SEPSIS WITH ENCEPHALOPATHY WITHOUT SEPTIC SHOCK (HCC): Status: ACTIVE | Noted: 2021-06-04

## 2021-06-04 PROBLEM — G93.40 SEPSIS WITH ENCEPHALOPATHY WITHOUT SEPTIC SHOCK (HCC): Status: ACTIVE | Noted: 2021-06-04

## 2021-06-04 PROBLEM — R65.20 SEPSIS WITH ENCEPHALOPATHY WITHOUT SEPTIC SHOCK (HCC): Status: ACTIVE | Noted: 2021-06-04

## 2021-06-04 LAB
BASOPHILS ABSOLUTE: 0 K/CU MM
BASOPHILS RELATIVE PERCENT: 0.2 % (ref 0–1)
DIFFERENTIAL TYPE: ABNORMAL
EOSINOPHILS ABSOLUTE: 0.3 K/CU MM
EOSINOPHILS RELATIVE PERCENT: 2.6 % (ref 0–3)
FERRITIN: 601 NG/ML (ref 15–150)
FOLATE: 11.1 NG/ML (ref 3.1–17.5)
HCT VFR BLD CALC: 35.5 % (ref 37–47)
HEMOGLOBIN: 11.1 GM/DL (ref 12.5–16)
IMMATURE NEUTROPHIL %: 0.4 % (ref 0–0.43)
IRON: 25 UG/DL (ref 37–145)
LYMPHOCYTES ABSOLUTE: 0.4 K/CU MM
LYMPHOCYTES RELATIVE PERCENT: 3.5 % (ref 24–44)
MCH RBC QN AUTO: 29.4 PG (ref 27–31)
MCHC RBC AUTO-ENTMCNC: 31.3 % (ref 32–36)
MCV RBC AUTO: 93.9 FL (ref 78–100)
MONOCYTES ABSOLUTE: 0.7 K/CU MM
MONOCYTES RELATIVE PERCENT: 6.1 % (ref 0–4)
NUCLEATED RBC %: 0 %
PCT TRANSFERRIN: 13 % (ref 10–44)
PDW BLD-RTO: 15.9 % (ref 11.7–14.9)
PLATELET # BLD: 236 K/CU MM (ref 140–440)
PMV BLD AUTO: 9.6 FL (ref 7.5–11.1)
RBC # BLD: 3.78 M/CU MM (ref 4.2–5.4)
RETICULOCYTE COUNT PCT: 2.1 % (ref 0.2–2.2)
SEGMENTED NEUTROPHILS ABSOLUTE COUNT: 10 K/CU MM
SEGMENTED NEUTROPHILS RELATIVE PERCENT: 87.2 % (ref 36–66)
TOTAL IMMATURE NEUTOROPHIL: 0.04 K/CU MM
TOTAL IRON BINDING CAPACITY: 187 UG/DL (ref 250–450)
TOTAL NUCLEATED RBC: 0 K/CU MM
UNSATURATED IRON BINDING CAPACITY: 162 UG/DL (ref 110–370)
VITAMIN B-12: 788.3 PG/ML (ref 211–911)
WBC # BLD: 11.4 K/CU MM (ref 4–10.5)

## 2021-06-04 PROCEDURE — 6370000000 HC RX 637 (ALT 250 FOR IP): Performed by: INTERNAL MEDICINE

## 2021-06-04 PROCEDURE — 99232 SBSQ HOSP IP/OBS MODERATE 35: CPT | Performed by: INTERNAL MEDICINE

## 2021-06-04 PROCEDURE — 6360000002 HC RX W HCPCS: Performed by: INTERNAL MEDICINE

## 2021-06-04 PROCEDURE — 2580000003 HC RX 258: Performed by: STUDENT IN AN ORGANIZED HEALTH CARE EDUCATION/TRAINING PROGRAM

## 2021-06-04 PROCEDURE — 2580000003 HC RX 258: Performed by: INTERNAL MEDICINE

## 2021-06-04 PROCEDURE — 6360000002 HC RX W HCPCS: Performed by: STUDENT IN AN ORGANIZED HEALTH CARE EDUCATION/TRAINING PROGRAM

## 2021-06-04 PROCEDURE — 94761 N-INVAS EAR/PLS OXIMETRY MLT: CPT

## 2021-06-04 PROCEDURE — 1200000000 HC SEMI PRIVATE

## 2021-06-04 PROCEDURE — 6370000000 HC RX 637 (ALT 250 FOR IP): Performed by: STUDENT IN AN ORGANIZED HEALTH CARE EDUCATION/TRAINING PROGRAM

## 2021-06-04 RX ADMIN — DOCUSATE SODIUM 50 MG AND SENNOSIDES 8.6 MG 1 TABLET: 8.6; 5 TABLET, FILM COATED ORAL at 22:54

## 2021-06-04 RX ADMIN — POLYETHYLENE GLYCOL (3350) 17 G: 17 POWDER, FOR SOLUTION ORAL at 10:38

## 2021-06-04 RX ADMIN — LEVOTHYROXINE SODIUM 100 MCG: 0.1 TABLET ORAL at 06:47

## 2021-06-04 RX ADMIN — MIDODRINE HYDROCHLORIDE 10 MG: 5 TABLET ORAL at 17:12

## 2021-06-04 RX ADMIN — ASPIRIN 325 MG: 325 TABLET, COATED ORAL at 10:37

## 2021-06-04 RX ADMIN — DOCUSATE SODIUM 50 MG AND SENNOSIDES 8.6 MG 1 TABLET: 8.6; 5 TABLET, FILM COATED ORAL at 10:51

## 2021-06-04 RX ADMIN — ACETAMINOPHEN 650 MG: 325 TABLET ORAL at 22:54

## 2021-06-04 RX ADMIN — CEFTRIAXONE 1000 MG: 1 INJECTION, POWDER, FOR SOLUTION INTRAMUSCULAR; INTRAVENOUS at 13:25

## 2021-06-04 RX ADMIN — MIDODRINE HYDROCHLORIDE 10 MG: 5 TABLET ORAL at 10:37

## 2021-06-04 RX ADMIN — SODIUM CHLORIDE, PRESERVATIVE FREE 10 ML: 5 INJECTION INTRAVENOUS at 10:53

## 2021-06-04 RX ADMIN — ENOXAPARIN SODIUM 40 MG: 40 INJECTION SUBCUTANEOUS at 10:37

## 2021-06-04 RX ADMIN — POLYETHYLENE GLYCOL (3350) 17 G: 17 POWDER, FOR SOLUTION ORAL at 22:54

## 2021-06-04 RX ADMIN — MIDODRINE HYDROCHLORIDE 10 MG: 5 TABLET ORAL at 13:23

## 2021-06-04 RX ADMIN — SODIUM CHLORIDE, PRESERVATIVE FREE 10 ML: 5 INJECTION INTRAVENOUS at 22:54

## 2021-06-04 ASSESSMENT — PAIN SCALES - GENERAL
PAINLEVEL_OUTOF10: 4
PAINLEVEL_OUTOF10: 0

## 2021-06-04 NOTE — CARE COORDINATION
LSW spoke with pt son regarding their decision for discharge plans. Pt's son stated they family plans for pt to go home with her son with [facility]. Pt's son stated he has taken leave form work and will be able to stay with pt. Pt's son is also in the process of hiring someone to help him at home with pt so when the time comes he can return to work. TERESAW called Kim Hollingsworth with Hospice and she will be coming to the pt's room for son to complete the Hospice paperwork. Pt's son stated he will have pt room ready tomorrow. TERESAW PS the doctor and informed him of this information.

## 2021-06-04 NOTE — PROGRESS NOTES
her father. Allergies   Allergen Reactions    Penicillins Rash         Objective:   BP (!) 90/56   Pulse 85   Temp 97.5 °F (36.4 °C) (Oral)   Resp 16   Ht 5' 2\" (1.575 m)   Wt 193 lb 1.6 oz (87.6 kg)   SpO2 93%   BMI 35.32 kg/m²       Intake/Output Summary (Last 24 hours) at 6/4/2021 1116  Last data filed at 6/4/2021 1101  Gross per 24 hour   Intake 420 ml   Output 650 ml   Net -230 ml       TELEMETRY: sinus    Physical Exam:  Constitutional:  Well developed, Well nourished, No acute distress, Non-toxic appearance. HENT:  Normocephalic, Atraumatic, Bilateral external ears normal, Oropharynx moist, No oral exudates, Nose normal. Neck- Normal range of motion, No tenderness, Supple, No stridor. Eyes:  PERRL, EOMI, Conjunctiva normal, No discharge. Respiratory:  Normal breath sounds, No respiratory distress, No wheezing, No chest tenderness. ,no use of accessory muscles, diaphragm movement is normal  Cardiovascular: (PMI) apex non displaced,no lifts no thrills, no s3,no s4, Normal heart rate, Normal rhythm, No murmurs, No rubs, No gallops. Carotid arteries pulse and amplitude are normal no bruit, no abdominal bruit noted ( normal abdominal aorta ausculation), femoral arteries pulse and amplitude are normal no bruit, pedal pulses are normal  GI:  Bowel sounds normal, Soft, No tenderness, No masses, No pulsatile masses. : External genitalia appear normal, No masses or lesions. No discharge. No CVA tenderness. Musculoskeletal:  Intact distal pulses, + edema, No tenderness, No cyanosis, No clubbing. Good range of motion in all major joints. No tenderness to palpation or major deformities noted. Back- No tenderness. Integument:  Warm, Dry, No erythema, No rash. Lymphatic:  No lymphadenopathy noted. Neurologic:  Alert & oriented x 3, Normal motor function, Normal sensory function, No focal deficits noted.    Psychiatric:  Affect normal, Judgment normal, Mood normal.     Medications:    levothyroxine  100 mcg Oral Daily    sodium chloride flush  5-40 mL Intravenous 2 times per day    enoxaparin  40 mg Subcutaneous Daily    sennosides-docusate sodium  1 tablet Oral BID    polyethylene glycol  17 g Oral BID    aspirin  325 mg Oral Daily    cefTRIAXone (ROCEPHIN) IV  1,000 mg Intravenous Q24H    midodrine  10 mg Oral TID WC      sodium chloride       sodium chloride flush, sodium chloride, promethazine **OR** ondansetron, acetaminophen **OR** acetaminophen, fleet    Lab Data:  CBC:   Recent Labs     06/03/21 0431   WBC 14.3*   HGB 9.8*   HCT 32.9*   MCV 99.4        BMP:   Recent Labs     06/02/21 0435 06/03/21 0431    139   K 4.7 4.3    108   CO2 15* 17*   PHOS 5.5*  --    BUN 64* 63*   CREATININE 1.2* 1.2*     LIVER PROFILE:   Recent Labs     06/02/21 0435   AST 10*   ALT 6*   BILITOT 0.3   ALKPHOS 59     PT/INR: No results for input(s): PROTIME, INR in the last 72 hours. APTT: No results for input(s): APTT in the last 72 hours. BNP:  No results for input(s): BNP in the last 72 hours. TROPONIN: @TROPONINI:3@      Assessment:  78 y. o.year old who is admitted for          Plan:  1. Elevated trop: not an ACS, most likely from sepsis and pneumonia, will get EKG, recommend to add aspirin, statins, recommend to get CT Chest to check for pneumonia a  2. 'fecal impaction, colitis: as per medicine'  3. Hydonephrosis: as per medine  4. ARF: hold diruretic and add 250cc NS, further management as per nephrology  5. Hypothyroidism: continue synthroid  All labs, medications and tests reviewed, continue all other medications of all above medical condition listed as is.       Steven Portillo MD, MD 6/4/2021 11:16 AM

## 2021-06-04 NOTE — PLAN OF CARE
Problem: Falls - Risk of:  Goal: Will remain free from falls  Description: Will remain free from falls  6/4/2021 1102 by Moira Lay RN  Outcome: Ongoing     Problem: Falls - Risk of:  Goal: Absence of physical injury  Description: Absence of physical injury  6/4/2021 1102 by Moira Lay RN  Outcome: Ongoing     Problem: Skin Integrity:  Goal: Will show no infection signs and symptoms  Description: Will show no infection signs and symptoms  6/4/2021 1102 by Moira Lay RN  Outcome: Ongoing     Problem: Skin Integrity:  Goal: Absence of new skin breakdown  Description: Absence of new skin breakdown  6/4/2021 1102 by Moira Lay RN  Outcome: Ongoing

## 2021-06-04 NOTE — CONSULTS
[address] [place] [address] Consult Note    Date: 6/4/2021  Name: Altaf Elliott  MRN: [id number]  YOB: 1940   Patient's PCP: Edith Montgomery MD   Consultants during acute care: Urology, Cardiology, Nephrology, Palliative Medicine  Referring Physician: Dr. Donna Jara care admission date: 5/31/2021 and [facility] 4/24 to 4/30/21 after fall with rib fractures and pneumothorax    Informant: Chart reviewed, discussed with DEAN Bell RN, and I met with the patient at the bedside, who provides some history but is intermittently confused. CC: weak, confused    [place]: This is a 78 y.o. female with Diastolic heart failure, hypertension, /t2dm, hyperlipidemia, hypothyroidism, lumbar spinal stenosis admitted from the Nursing Home on 5/31/2021 with constipation, abdominal pain, confusion, mild hydronephrosis and volume overload. She has been seen by multiple consultants. Her BP's have been borderline/low and has limited the diuresis. She is on Midodrine. Imaging has suggested cirrhosis and ascites. There is no alcohol history. The patient has been in declining health for months with decreasing cognition. The patient had been in [facility] in April with pneumothorax after fall and rib fractures. Evidently was discharged to [facility], although the patient does not recall that. She tells me that she lived with her brother. The patient currently denies pain or dyspnea. Her appetite is fair. She has not been out of bed (per the patient). Her weight is up 15 pounds since April per Epic records, serum albumin is 2.6 gm/dl. The patient requires assistance for ADL's. The patient was seen by Palliative Medicine. Dr. Dick Rivera has asked for hospice to evaluate, and the hospice nurse liaison met with the patient's son, Suzette Menjivar, on 6/3/21. Hospice philosophy was discussed regarding care and comfort at the end of life.  Questions were answered, and emotional support was provided. They are aware that Hospice does not provide for the patients 24 hour care giving needs. The patient is DNR-comfort care status. Both the patient's son and grandson work. The patient likely needs someone present during the day. Discharge plan is unclear. Past Medical History:   Diagnosis Date    Family history of abdominal aortic aneurysm     CT Abd Aorta 10/2007 - normal aorta    Family history of colon cancer     Colonoscopy due 1/2013    Fracture of left humerus 3/2014    Carozza    Hypertension     Hypothyroidism, postsurgical 1997    Left Thyroid Lobectomy    Lumbar Spinal Stenosis 2006    severe    Multinodular goiter 1996    suppresion and bx negative; s/p thyroid lobectomy    CHAUDHRY (nonalcoholic steatohepatitis) 2005    chaudhry vs cirrhosis;  Dr. Natalie Zuniga Necrobiosis lipoidica diabeticorum ([facility] [address]     Obesity, Class II, BMI 35-39.9, with comorbidity     Osteoporosis 1/2015    FRAX 5.5 & 17%    Polymyalgia rheumatica ([facility] [address] 1/2012    Dr Catalan Doctor R; released her 3/2014    RBBB 02/2017    new finding    Type II Diabetes Mellitus 1994    per pt on 3/14/2019\"they do not consider me diabetic anymore- took me off medication long time ago\"    Urolithiasis     once       Past Surgical History:   Procedure Laterality Date    ANUS SURGERY  ? when    Anal Fissure Repair    CARPAL TUNNEL RELEASE Right 3/18/2019    RIGHT CARPAL TUNNEL RELEASE performed by Savannah Kramer MD at [address] Bilateral 10+ yrs ago    CHOLECYSTECTOMY, [address]  last one 5+ ys ago    [address] Left 1/2011    ORIF    KNEE ARTHROSCOPY Right 2008    ORIF HUMERUS DECOMPRESSION Right 12/2006    right humeral fracture    SHOULDER ARTHROSCOPY Right ?when    JANA AND BSO      \"sometime in my 50's\"    THYROID LOBECTOMY Left 1995    TONSILLECTOMY  as a child       Social History     Socioeconomic History    Marital status:       Spouse name: Not on file    Number of children: 2    Years of education: high [employer]    Highest education level: Not on file   Occupational History    Occupation: retired   Tobacco Use    Smoking status: Never Smoker    Smokeless tobacco: Never Used   Vaping Use    Vaping Use: Never used   Substance and Sexual Activity    Alcohol use: No    Drug use: No    Sexual activity: Not on file   Other Topics Concern    Not on file   Social History Narrative    Not on file     Social Determinants of Health     Financial Resource Strain:     Difficulty of Paying Living Expenses:    Food Insecurity:     Worried About [address] in the Last Year:     [address] [employer] in the Last Year:    Transportation Needs:     Lack of Transportation (Medical):  Lack of Transportation (Non-Medical):    Physical Activity:     Days of Exercise per Week:     Minutes of Exercise per Session:    Stress:     Feeling of Stress :    Social Connections:     Frequency of Communication with Friends and Family:     Frequency of Social Gatherings with Friends and Family:     Attends Protestant Services:     Active Member of Clubs or Organizations:     Attends Club or Organization Meetings:     Marital Status:    Intimate Partner Violence:     Fear of Current or Ex-Partner:     Emotionally Abused:     Physically Abused:     Sexually Abused:        Family History   Problem Relation Age of Onset    Other Father         heart issues    Stroke Father     Heart Disease Brother     Other Brother         hx of heart cath with blockage    Heart Surgery Brother     Heart Surgery Brother     Cancer Brother 68        lung    Other Son         knee and multiple issues       Allergies   Allergen Reactions    Penicillins Rash       Medication list reviewed  Prior to Admission medications    Medication Sig Start Date End Date Taking?  Authorizing Provider   metoprolol succinate (TOPROL XL) 25 MG extended release tablet Take 0.5 tablets by mouth daily 3/30/21   Nacho Torres MD   furosemide (LASIX) 20 MG tablet Take 1 tablet by mouth daily 3/30/21   Nacho Torres MD   alendronate (FOSAMAX) 70 MG tablet Take 1 tablet by mouth every 7 days  Patient taking differently: Take 70 mg by mouth every 7 days 21   Nacho Torres MD   Handandrea Kuhn MISC by Does not apply route Good for 5 years 21   Nacho Torres MD   levothyroxine (SYNTHROID) 100 MCG tablet Take 1 tablet by mouth daily 21   Nacho Torres MD   Cholecalciferol (VITAMIN D) 2000 UNITS CAPS capsule Take 1 capsule by mouth daily. Historical Provider, MD       ROS: As noted in [address], all other systems are limited due to the patient's clinical condition and some confusion:  Constitutional: generally weak, no fever, chills, + weight gain, edema  HEENT:  No acute visual changes, nasal drainage,   CV:  No chest pain, palpitations  PULM:  Denies current shortness of breath, no hemoptysis  GI:  Appetite is fair, + constipation and large stool burden on admission, improved  :  Denies dysuria  Musculoskeletal:  + edema,   Neuro: generally weak, decreased cognition,   Skin:  No rash    Weight:    Wt Readings from Last 3 Encounters:   21 193 lb 1.6 oz (87.6 kg)   21 179 lb (81.2 kg)   21 178 lb 12.8 oz (81.1 kg)       Data reviewed 2021:  Transthoracic Echocardiogram 3/22/21:   Left ventricular systolic function is normal.   Ejection fraction is visually estimated at 50-55%. Mild left ventricular hypertrophy. Mitral annular calcification with mild mitral stenosis; mean PmmHg. No evidence of any pericardial effusion. Right pleural effusion. CT-scan of abdomen and pelvis 21  1. Large amount of stool within the distal colon and rectum with mild wall   thickening and adjacent stranding, concerning for constipation and stercoral colitis.    2. Urinary bladder distention with bilateral hydroureteronephrosis. There is no nephrolithiasis. Correlation for any signs or symptoms of bladder outlet obstruction is recommended. 3. Nodular hepatic contour, suggestive of cirrhosis. Small ascites. .   4. At least moderate bilateral pleural effusions with adjacent consolidation,   likely atelectasis. 5. 1.1 cm indeterminate left adrenal nodule. Adrenal protocol MRI or CT in 1 year could provide further information as clinically indicated. CT-scan of the brain 5/31/21  1. No acute intracranial abnormality. 2. Stable mild-to-moderate chronic white matter microvascular ischemic   changes. Chest X-ray  5/31/21  New small layering right pleural effusion with right lung base atelectasis.      Hemoglobin A1C 3/18/21: 5.3%  Pro-BNP 5/31/21: 29.761    Hepatic Function Panel:    Lab Results   Component Value Date    ALKPHOS 59 06/02/2021    ALT 6 06/02/2021    AST 10 06/02/2021    PROT 4.4 06/02/2021    PROT 6.4 03/18/2013    BILITOT 0.3 06/02/2021    LABALBU 2.6 06/02/2021     CBC:   Recent Labs     06/03/21 0431   WBC 14.3*   HGB 9.8*   HCT 32.9*   MCV 99.4        BMP:   Recent Labs     06/02/21  0435 06/03/21 0431    139   K 4.7 4.3    108   CO2 15* 17*   BUN 64* 63*   CREATININE 1.2* 1.2*   GLUCOSE 104* 104*       Physical Exam:   BP (!) 80/48   Pulse 95   Temp 98.1 °F (36.7 °C) (Oral)   Resp 14   Ht 5' 2\" (1.575 m)   Wt 193 lb 1.6 oz (87.6 kg)   SpO2 93%   BMI 35.32 kg/m²   General: Comfortable, lethargic, in no distress, knows she is in the hospital, disoriented to day, date  Skin: decreased subcutaneous fat  HEENT: Mucous membranes are mildly dry, sclerae are clear, mild conjunctival pallor  Neck: no JVD, carotid upstrokes are diminished without bruit   Heart: distant tones, RRR, S1S2, no murmurs  Lungs:  Equal breath sounds bilaterally, diminished at the bases, with basilar rales, no rhonchi   Abdomen: obese, soft, bowel sounds present, no apparent tenderness,

## 2021-06-04 NOTE — PROGRESS NOTES
Physician Progress Note      Bharath MATTA #:                  [id number]  :                       1940  ADMIT DATE:       2021 4:35 PM  [address] [place] DATE:  RESPONDING  PROVIDER #:        Fabian Gomez          QUERY TEXT:    Hospitalists,    Pt admitted with sepsis and has malnutrition documented. Please further   specify type of malnutrition with documentation in the medical record. The medical record reflects the following:  Risk Factors: chronic illness  Clinical Indicators: documentation of malnutrition, dehydration and decreased   oral intake, BMI 35  Treatment: monitor I&O, labs. palliative care consult    Thank you,  Abdoul Lamb RN CDS  Options provided:  -- Mild Malnutrition  -- Moderate Malnutrition  -- Severe Malnutrition  -- Mild Protein calorie malnutrition  -- Moderate Protein calorie malnutrition  -- Severe Protein calorie malnutrition  -- Other - I will add my own diagnosis  -- Disagree - Not applicable / Not valid  -- Disagree - Clinically unable to determine / Unknown  -- Refer to Clinical Documentation Reviewer    PROVIDER RESPONSE TEXT:    This patient has severe malnutrition.     Query created by: Ute Jefferson on 6/3/2021 5:00 PM      Electronically signed by:  Fabian Gomez 2021 5:03 PM

## 2021-06-04 NOTE — PROGRESS NOTES
Nephrology Progress Note  6/4/2021 4:59 PM        Subjective:   Admit Date: 5/31/2021  PCP: Da Potter MD    Interval History: pt seen in early am     Diet: some    ROS:  mary better this ma   No audible wheeze   seems comfortable   UOP 60/d    Data:     Current meds:    levothyroxine  100 mcg Oral Daily    sodium chloride flush  5-40 mL Intravenous 2 times per day    enoxaparin  40 mg Subcutaneous Daily    sennosides-docusate sodium  1 tablet Oral BID    polyethylene glycol  17 g Oral BID    aspirin  325 mg Oral Daily    cefTRIAXone (ROCEPHIN) IV  1,000 mg Intravenous Q24H    midodrine  10 mg Oral TID WC      sodium chloride           I/O last 3 completed shifts: In: 420 [P.O.:420]  Out: 650 [Urine:650]    CBC:   Recent Labs     06/03/21 0431   WBC 14.3*   HGB 9.8*             Recent Labs     06/02/21 0435 06/03/21 0431    139   K 4.7 4.3    108   CO2 15* 17*   BUN 64* 63*   CREATININE 1.2* 1.2*   GLUCOSE 104* 104*       Lab Results   Component Value Date    CALCIUM 7.3 (L) 06/03/2021    PHOS 5.5 (H) 06/02/2021       Objective:     Vitals: BP (!) 86/51   Pulse 85   Temp 98 °F (36.7 °C) (Oral)   Resp 18   Ht 5' 2\" (1.575 m)   Wt 193 lb 1.6 oz (87.6 kg)   SpO2 95%   BMI 35.32 kg/m²     General appearance:  No ac distress - awake and  alert - did talk to me   HEENT:  + conj palor  Neck:  supple  Lungs:  No gross crackles this am   Heart:  tachycardia  Abdomen: soft, + ascites  Extremities:  ++ edema   As solano       Problem List :         Impression :     1. BALBIR- acceptable all things considered  2. Fluid overload lived dz/ Dm etc     Recommendation/Plan  :     1. She will be with hospice   2. Plan  as yesterday  3. Will sign off   4. Thanks for the consult   5. She will  go with home hospice   6.  Ok to keep solano for comfort       Hugo Robbins MD MD

## 2021-06-05 VITALS
HEART RATE: 95 BPM | SYSTOLIC BLOOD PRESSURE: 92 MMHG | WEIGHT: 199.7 LBS | DIASTOLIC BLOOD PRESSURE: 62 MMHG | OXYGEN SATURATION: 93 % | TEMPERATURE: 97.7 F | BODY MASS INDEX: 36.75 KG/M2 | HEIGHT: 62 IN | RESPIRATION RATE: 17 BRPM

## 2021-06-05 LAB
CULTURE: NORMAL
CULTURE: NORMAL
Lab: NORMAL
Lab: NORMAL
SPECIMEN: NORMAL
SPECIMEN: NORMAL

## 2021-06-05 PROCEDURE — 6370000000 HC RX 637 (ALT 250 FOR IP): Performed by: INTERNAL MEDICINE

## 2021-06-05 PROCEDURE — 6370000000 HC RX 637 (ALT 250 FOR IP): Performed by: STUDENT IN AN ORGANIZED HEALTH CARE EDUCATION/TRAINING PROGRAM

## 2021-06-05 PROCEDURE — 6360000002 HC RX W HCPCS: Performed by: STUDENT IN AN ORGANIZED HEALTH CARE EDUCATION/TRAINING PROGRAM

## 2021-06-05 PROCEDURE — 2580000003 HC RX 258: Performed by: STUDENT IN AN ORGANIZED HEALTH CARE EDUCATION/TRAINING PROGRAM

## 2021-06-05 PROCEDURE — 94761 N-INVAS EAR/PLS OXIMETRY MLT: CPT

## 2021-06-05 RX ADMIN — POLYETHYLENE GLYCOL (3350) 17 G: 17 POWDER, FOR SOLUTION ORAL at 08:39

## 2021-06-05 RX ADMIN — DOCUSATE SODIUM 50 MG AND SENNOSIDES 8.6 MG 1 TABLET: 8.6; 5 TABLET, FILM COATED ORAL at 08:39

## 2021-06-05 RX ADMIN — SODIUM CHLORIDE, PRESERVATIVE FREE 10 ML: 5 INJECTION INTRAVENOUS at 08:40

## 2021-06-05 RX ADMIN — MIDODRINE HYDROCHLORIDE 10 MG: 5 TABLET ORAL at 08:39

## 2021-06-05 RX ADMIN — ASPIRIN 325 MG: 325 TABLET, COATED ORAL at 08:42

## 2021-06-05 RX ADMIN — ENOXAPARIN SODIUM 40 MG: 40 INJECTION SUBCUTANEOUS at 08:39

## 2021-06-05 RX ADMIN — MIDODRINE HYDROCHLORIDE 10 MG: 5 TABLET ORAL at 12:37

## 2021-06-05 RX ADMIN — LEVOTHYROXINE SODIUM 100 MCG: 0.1 TABLET ORAL at 07:01

## 2021-06-05 ASSESSMENT — PAIN SCALES - GENERAL
PAINLEVEL_OUTOF10: 0
PAINLEVEL_OUTOF10: 0

## 2021-06-05 NOTE — PROGRESS NOTES
Nephrology Progress Note  6/5/2021 3:11 PM        Subjective:   Admit Date: 5/31/2021  PCP: Kiya Carmichael MD    Interval History: seen in am     Diet: some    ROS:  No resp distress  seems very comfortable  UOP  650/d         Data:     Current meds:    levothyroxine  100 mcg Oral Daily    sodium chloride flush  5-40 mL Intravenous 2 times per day    enoxaparin  40 mg Subcutaneous Daily    sennosides-docusate sodium  1 tablet Oral BID    polyethylene glycol  17 g Oral BID    aspirin  325 mg Oral Daily    cefTRIAXone (ROCEPHIN) IV  1,000 mg Intravenous Q24H    midodrine  10 mg Oral TID WC      sodium chloride           I/O last 3 completed shifts:  In: -   Out: 300 [Urine:300]    CBC:   Recent Labs     06/03/21 0431   WBC 14.3*   HGB 9.8*             Recent Labs     06/03/21 0431      K 4.3      CO2 17*   BUN 63*   CREATININE 1.2*   GLUCOSE 104*       Lab Results   Component Value Date    CALCIUM 7.3 (L) 06/03/2021    PHOS 5.5 (H) 06/02/2021       Objective:     Vitals: BP 92/62   Pulse 95   Temp 97.7 °F (36.5 °C) (Oral)   Resp 17   Ht 5' 2\" (1.575 m)   Wt 199 lb 11.2 oz (90.6 kg)   SpO2 93%   BMI 36.53 kg/m²     General appearance:  No ditrss  HEENT:  ++ conj pallor  Neck:  supple  Lungs:  bronchial BS  Heart:  irregular  Abdomen: soft- ascites  Extremities:  ++ edema   Has solano       Problem List :         Impression :     1. BALBIR- she is with hospice  and has acceptable UOP- and comfortable   2. Lover dz - fluid overload- comfort care now     Recommendation/Plan  :     1. She will likely go home with home hospice today   2. She is only with comfort med's   3.  Off midodrine - - as no reason to keep it       Kenan Ross MD MD

## 2021-06-05 NOTE — CARE COORDINATION
LSW was informed that pt has a discharge order for home with Hospice. LSW spoke with martha with Hospice and she stated she is not in house today however she has pt equipment to be delivered to her son's home between 1 and 3:00pm.  LSW called Hospice main number and informed them of pt discharge and to let this LSW know once transport was set up. They inforemed this LSW that they would send it to scheduling.      1:24. LSW spoke with Jorge Wilkins from Hospice to see if pt has a transport time. Jorge Wilkins stated she has not gotten to this yet and will let LSW know when they have a  time.

## 2021-06-05 NOTE — PLAN OF CARE
Problem: Falls - Risk of:  Goal: Will remain free from falls  Description: Will remain free from falls  Outcome: Completed  Goal: Absence of physical injury  Description: Absence of physical injury  Outcome: Completed     Problem: Skin Integrity:  Goal: Will show no infection signs and symptoms  Description: Will show no infection signs and symptoms  Outcome: Completed  Goal: Absence of new skin breakdown  Description: Absence of new skin breakdown  Outcome: Completed     Problem: Health Behavior:  Goal: Identification of resources available to assist in meeting health care needs will improve  Description: Identification of resources available to assist in meeting health care needs will improve  Outcome: Completed     Problem: Role Relationship:  Goal: Ability to participate appropriately in conversations will improve  Description: Ability to participate appropriately in conversations will improve  Outcome: Completed     Problem: Safety:  Goal: Ability to remain free from injury will improve  Description: Ability to remain free from injury will improve  Outcome: Completed     Problem: Self-Care:  Goal: Ability to participate in self-care as condition permits will improve  Description: Ability to participate in self-care as condition permits will improve  Outcome: Completed     Problem: Pain:  Goal: Pain level will decrease  Description: Pain level will decrease  Outcome: Completed  Goal: Control of acute pain  Description: Control of acute pain  Outcome: Completed  Goal: Control of chronic pain  Description: Control of chronic pain  Outcome: Completed

## 2021-06-05 NOTE — DISCHARGE SUMMARY
Discharge Summary    Name:  Antonio Pereira /Age/Sex: 1940  (78 y.o. female)   MRN & CSN:  [id number] & [id number] Admission Date/Time: 2021  4:35 PM   Attending:  Brianna Wasserman MD Discharging Provider Tara Christianson, [address] [place] [address] Course: Antonio Pereira is a 78 y.o.  female  who presents with Acute metabolic encephalopathy    Hospital Course: The patient was initially admitted 2021 for concern of BALBIR 2/2 bilateral hydroureteronephrosis and constipation. Suspected sepsis due to pulmonary source and was started on antibiotics on 2021. Clinical condition and critical condition discussed with family and elected for conservative approach/comfort due to progressive declining health status. CODE STATUS was discussed and changed to DNR CC. Hospice and palliative care teams arrived the patient and met with family for treatment plan. Patient will be discharged home with hospice service on board. 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 Sergio Diaz on 2021. Appropriate understanding of and agreement with the discharge recommendations, medications, and plan.      Consults this admission:  IP CONSULT TO HOSPITALIST  IP CONSULT TO CARDIOLOGY  IP CONSULT TO UROLOGY  IP CONSULT TO NEPHROLOGY  IP CONSULT TO PALLIATIVE CARE  IP CONSULT TO PALLIATIVE CARE  IP CONSULT TO [address] [place] [address]    Discharge Instruction:   Follow up appointments: Per Hospice  Primary care physician:  As needed  Urology x 1 weeks    Diet:  renal diet   Activity: bedrest  Disposition: Discharged to:   [x]Home, []HHC, []SNF, []Acute Rehab, [x]Hospice   Condition on discharge: stable with poor prognosis     Discharge Medications:      Sherif Currie   Home Medication Instructions ECL:[id number]    Printed on:06/05/21 0351   Medication Information                      Handicap Placard MISC  by Does not apply route Good for 5 years             levothyroxine (SYNTHROID) 100 MCG tablet  Take 1 tablet by mouth daily             metoprolol succinate (TOPROL XL) 25 MG extended release tablet  Take 0.5 tablets by mouth daily               Medication changes as per hospice    Objective Findings at Discharge:     Vitals:    06/04/21 1715 06/04/21 2255 06/05/21 0400 06/05/21 0830   BP: 104/63 (!) 84/47 (!) 82/52 92/62   Pulse:  88 98 95   Resp:  18 18 17   Temp:  97.9 °F (36.6 °C) 97.9 °F (36.6 °C) 97.7 °F (36.5 °C)   TempSrc:  Oral Oral Oral   SpO2:  94% 91% 93%   Weight:   199 lb 11.2 oz (90.6 kg)    Height:                  Physical Exam: 06/05/21       Gen:  awake, alert, cooperative, no apparent distress obese body habitus  Head/Eyes:   MM moist, normocephalic atraumatic, EOMI   NECK:   symmetrical, trachea midline  LUNGS: Diminished, normal Effort/ symmetry movement   CARDIOVASCULAR:  Normal rate peripheral edema +1  ABDOMEN: Non tender, non distended,   MUSCULOSKELETAL: no gross deformities  NEUROLOGIC: Generalized weakness  SKIN:  no bruising or bleeding, normal skin color,  no redness or rashes noted    Data:     Laboratory this visit:  Reviewed  Recent Labs     06/03/21 0431   WBC 14.3*   HGB 9.8*   HCT 32.9*         Recent Labs     06/03/21  0431      K 4.3      CO2 17*   BUN 63*   CREATININE 1.2*       Radiology this visit:  Reviewed.     CT HEAD WO CONTRAST    Result Date: 5/31/2021  EXAMINATION: CT OF THE HEAD WITHOUT CONTRAST  5/31/2021 7:02 pm TECHNIQUE: CT of the head was performed without the administration of intravenous contrast. Dose modulation, iterative reconstruction, and/or weight based adjustment of the mA/kV was utilized to reduce the radiation dose to as low as reasonably achievable. COMPARISON: 04/24/2021 HISTORY: ORDERING SYSTEM PROVIDED HISTORY: reported hallucinations and altered mental status TECHNOLOGIST PROVIDED HISTORY: Reason for exam:->reported hallucinations and altered mental status Reason for Exam: VERTIGO Relevant Medical/Surgical History: INJECTED PRIOR ON CT ABD/PEL THIS EVENING FINDINGS: BRAIN/VENTRICLES: There is no acute intracranial hemorrhage, mass effect or midline shift. No abnormal extra-axial fluid collection. The gray-white differentiation is maintained without evidence of an acute infarct. There is no evidence of hydrocephalus. Stable diffuse parenchymal volume loss with mild-to-moderate chronic white matter microvascular ischemic changes. ORBITS: The visualized portion of the orbits demonstrate no acute abnormality. SINUSES: The visualized paranasal sinuses and mastoid air cells demonstrate no acute abnormality. SOFT TISSUES/SKULL:  No acute abnormality of the visualized skull or soft tissues. 1. No acute intracranial abnormality. 2. Stable mild-to-moderate chronic white matter microvascular ischemic changes. CT ABDOMEN PELVIS W IV CONTRAST Additional Contrast? None    Result Date: 5/31/2021  EXAMINATION: CT OF THE ABDOMEN AND PELVIS WITH CONTRAST 5/31/2021 5:46 pm TECHNIQUE: CT of the abdomen and pelvis was performed with the administration of intravenous contrast. Multiplanar reformatted images are provided for review. Dose modulation, iterative reconstruction, and/or weight based adjustment of the mA/kV was utilized to reduce the radiation dose to as low as reasonably achievable.  COMPARISON: 04/24/2021 HISTORY: ORDERING SYSTEM PROVIDED HISTORY: abdominal pain and distension, reports constipation, concern for bowel obstruction or other acute process TECHNOLOGIST PROVIDED HISTORY: Additional Contrast?->None Reason for exam:->abdominal pain and distension, reports constipation, concern for bowel obstruction or other acute process Decision Support Exception - unselect if not a suspected or confirmed emergency medical condition->Emergency Medical Condition (MA) Reason for Exam: distention, constipation FINDINGS: Lower Chest: There are at least moderate bilateral pleural effusions with adjacent consolidation in both lung bases. Coronary artery atherosclerosis. There is a 6 mm noncalcified right lower lobe nodule, similar to prior chest CT. Organs: The liver is cirrhotic in configuration with multiple calcified granulomas. Calcified granulomas are also noted in the spleen. Prior cholecystectomy with mild intra and extrahepatic biliary ductal dilation, similar in appearance to prior examination. No acute abnormality in the pancreas or adrenal glands. 1.1 cm left adrenal nodule is unchanged. No nephrolithiasis. There is bilateral hydroureteronephrosis extending to the level of the urinary bladder. GI/Bowel: The stomach is distended. The small bowel is nondilated. There is moderate stool within the distal colon and rectum with mild wall thickening and adjacent stranding. The colon is nondilated. The appendix is within normal limits. Pelvis: Urinary bladder is distended without wall thickening or vesicular stone. Prior hysterectomy. Peritoneum/Retroperitoneum: Small ascites. No pneumoperitoneum. Atherosclerosis of the nondilated abdominal aorta. Anasarca. Bones/Soft Tissues: There are multilevel degenerative changes of the lumbar spine. Height loss at L3 and L4 is similar in appearance to prior CT. No acute osseous abnormality. 1. Large amount of stool within the distal colon and rectum with mild wall thickening and adjacent stranding, concerning for constipation and stercoral colitis. 2. Urinary bladder distention with bilateral hydroureteronephrosis. There is no nephrolithiasis. Correlation for any signs or symptoms of bladder outlet obstruction is recommended.  3. Nodular hepatic contour, suggestive of cirrhosis. Small ascites. . 4. At least moderate bilateral pleural effusions with adjacent consolidation, likely atelectasis. 5. 1.1 cm indeterminate left adrenal nodule. Adrenal protocol MRI or CT in 1 year could provide further information as clinically indicated. XR CHEST PORTABLE    Result Date: 5/31/2021  EXAMINATION: ONE XRAY VIEW OF THE CHEST 5/31/2021 5:17 pm COMPARISON: 04/24/2021 HISTORY: ORDERING SYSTEM PROVIDED HISTORY: shortness of breath TECHNOLOGIST PROVIDED HISTORY: Reason for exam:->shortness of breath Reason for Exam: shortness of breath Acuity: Acute Type of Exam: Initial Additional signs and symptoms: na Relevant Medical/Surgical History: diabetes, hypertension FINDINGS: Left chest tube has been removed. Cardiac and mediastinal contours are stable. There is a new small layering right pleural effusion with right lung base atelectasis. There is no pneumothorax. The left lung is clear. Multiple healing bilateral rib fractures are redemonstrated. There is chronic deformity of the distal right clavicle. Left thyroidectomy surgical clips are present. New small layering right pleural effusion with right lung base atelectasis.          Discharge Time of 35 minutes    Electronically signed by NICOLA Cali CNP on 6/5/2021 at 8:59 AM

## (undated) DEVICE — BANDAGE COMPR ELASTIC 9 FTX4 IN STRL ESMARCH LF

## (undated) DEVICE — SUTURE PROL SZ 4-0 L18IN NONABSORBABLE BLU L13MM P-3 3/8 8699G

## (undated) DEVICE — ELECTRODE NDL L2.8IN COAT LO PWR SET EDGE

## (undated) DEVICE — PADDING,UNDERCAST,COTTON, 4"X4YD STERILE: Brand: MEDLINE

## (undated) DEVICE — PACK,BASIC,IX: Brand: MEDLINE

## (undated) DEVICE — PENCIL ES CRD L10FT HND SWCHING ROCK SWCH W/ EDGE COAT BLDE

## (undated) DEVICE — ELECTRODE ES AD CRDLSS PT RET REM POLYHESIVE

## (undated) DEVICE — BANDAGE,GAUZE,BULKEE II,4.5"X4.1YD,STRL: Brand: MEDLINE

## (undated) DEVICE — YANKAUER,FLEXIBLE HANDLE,REGLR CAPACITY: Brand: MEDLINE INDUSTRIES, INC.

## (undated) DEVICE — COUNTER NDL 60 COUNT FOAM STRP SGL MAG

## (undated) DEVICE — ULTRACLEAN ACCESSORY ELECTRODE 1" (2.54 CM) COATED BLADE: Brand: ULTRACLEAN

## (undated) DEVICE — LINER SUCT CANSTR 1500CC SEMI RIG W/ POR HYDROPHOBIC SHUT

## (undated) DEVICE — DRAPE SURGICAL HAND PROX AURORA

## (undated) DEVICE — STANDARD HYPODERMIC NEEDLE,POLYPROPYLENE HUB: Brand: MONOJECT

## (undated) DEVICE — GAUZE,SPONGE,4"X4",16PLY,XRAY,STRL,LF: Brand: MEDLINE

## (undated) DEVICE — SPONGE GZ W4XL8IN COT WVN 12 PLY

## (undated) DEVICE — DRAPE SHEET ULTRAGARD: Brand: MEDLINE

## (undated) DEVICE — CONMED ACCESSORY ELECTRODE, NEEDLE WITH EXTENDED INSULATION: Brand: CONMED

## (undated) DEVICE — BANDAGE,ELASTIC,ESMARK,STERILE,4"X9',LF: Brand: MEDLINE

## (undated) DEVICE — STERILE LATEX POWDER-FREE SURGICAL GLOVESWITH NITRILE COATING: Brand: PROTEXIS

## (undated) DEVICE — TOWEL,OR,DSP,ST,BLUE,STD,6/PK,12PK/CS: Brand: MEDLINE

## (undated) DEVICE — INTENDED FOR TISSUE SEPARATION, AND OTHER PROCEDURES THAT REQUIRE A SHARP SURGICAL BLADE TO PUNCTURE OR CUT.: Brand: BARD-PARKER ® STAINLESS STEEL BLADES

## (undated) DEVICE — CHLORAPREP 26ML ORANGE

## (undated) DEVICE — MARKER,SKIN,WI/RULER AND LABELS: Brand: MEDLINE

## (undated) DEVICE — CORD ES L15FT PT RET REUSE VALLEYLAB REM

## (undated) DEVICE — TUBING, SUCTION, 9/32" X 10', STRAIGHT: Brand: MEDLINE

## (undated) DEVICE — SOLUTION IV IRRIG WATER 1000ML POUR BRL 2F7114

## (undated) DEVICE — BANDAGE COMPR W4INXL5YD WHT BGE POLY COT M E WRP WV HK AND